# Patient Record
Sex: FEMALE | Race: WHITE | Employment: OTHER | URBAN - METROPOLITAN AREA
[De-identification: names, ages, dates, MRNs, and addresses within clinical notes are randomized per-mention and may not be internally consistent; named-entity substitution may affect disease eponyms.]

---

## 2017-01-04 ENCOUNTER — HOSPITAL ENCOUNTER (OUTPATIENT)
Dept: PREADMISSION TESTING | Age: 72
Discharge: HOME | End: 2017-01-04
Payer: MEDICARE

## 2017-01-04 DIAGNOSIS — M76.822 POSTERIOR TIBIAL TENDINITIS OF LEFT LEG: ICD-10-CM

## 2017-01-04 LAB
ATRIAL RATE: 84 BPM
CALCULATED P AXIS, ECG09: 15 DEGREES
CALCULATED R AXIS, ECG10: 44 DEGREES
CALCULATED T AXIS, ECG11: 58 DEGREES
DIAGNOSIS, 93000: NORMAL
HCT VFR BLD AUTO: 39.9 % (ref 35–45)
HGB BLD-MCNC: 14 G/DL (ref 12–16)
INR PPP: 1.2 (ref 0.8–1.2)
P-R INTERVAL, ECG05: 226 MS
PLATELET # BLD AUTO: 83 K/UL (ref 135–420)
POTASSIUM SERPL-SCNC: 3.8 MMOL/L (ref 3.5–5.5)
PROTHROMBIN TIME: 14.7 SEC (ref 11.5–15.2)
Q-T INTERVAL, ECG07: 394 MS
QRS DURATION, ECG06: 86 MS
QTC CALCULATION (BEZET), ECG08: 465 MS
VENTRICULAR RATE, ECG03: 84 BPM

## 2017-01-04 PROCEDURE — 93005 ELECTROCARDIOGRAM TRACING: CPT

## 2017-01-04 PROCEDURE — 36415 COLL VENOUS BLD VENIPUNCTURE: CPT | Performed by: PODIATRIST

## 2017-01-04 PROCEDURE — 85018 HEMOGLOBIN: CPT | Performed by: PODIATRIST

## 2017-01-04 PROCEDURE — 85049 AUTOMATED PLATELET COUNT: CPT | Performed by: PODIATRIST

## 2017-01-04 PROCEDURE — 85610 PROTHROMBIN TIME: CPT | Performed by: PODIATRIST

## 2017-01-04 PROCEDURE — 84132 ASSAY OF SERUM POTASSIUM: CPT | Performed by: PODIATRIST

## 2017-01-04 PROCEDURE — 85014 HEMATOCRIT: CPT | Performed by: PODIATRIST

## 2017-08-02 ENCOUNTER — HOSPITAL ENCOUNTER (OUTPATIENT)
Dept: GENERAL RADIOLOGY | Age: 72
Discharge: HOME | End: 2017-08-02
Attending: ORTHOPAEDIC SURGERY
Payer: MEDICARE

## 2017-08-02 ENCOUNTER — HOSPITAL ENCOUNTER (OUTPATIENT)
Dept: PREADMISSION TESTING | Age: 72
Discharge: HOME | End: 2017-08-02
Payer: MEDICARE

## 2017-08-02 DIAGNOSIS — Z01.818 PRE-OP TESTING: ICD-10-CM

## 2017-08-02 LAB
ALBUMIN SERPL BCP-MCNC: 3.4 G/DL (ref 3.4–5)
ALBUMIN/GLOB SERPL: 1.2 {RATIO} (ref 0.8–1.7)
ALP SERPL-CCNC: 96 U/L (ref 45–117)
ALT SERPL-CCNC: 38 U/L (ref 13–56)
ANION GAP BLD CALC-SCNC: 10 MMOL/L (ref 3–18)
APPEARANCE UR: CLEAR
APTT PPP: 33.6 SEC (ref 23–36.4)
AST SERPL W P-5'-P-CCNC: 32 U/L (ref 15–37)
BACTERIA SPEC CULT: NORMAL
BACTERIA URNS QL MICRO: ABNORMAL /HPF
BASOPHILS # BLD AUTO: 0 K/UL (ref 0–0.06)
BASOPHILS # BLD: 0 % (ref 0–2)
BILIRUB SERPL-MCNC: 3.8 MG/DL (ref 0.2–1)
BILIRUB UR QL: ABNORMAL
BUN SERPL-MCNC: 7 MG/DL (ref 7–18)
BUN/CREAT SERPL: 10 (ref 12–20)
CALCIUM SERPL-MCNC: 8.8 MG/DL (ref 8.5–10.1)
CHLORIDE SERPL-SCNC: 105 MMOL/L (ref 100–108)
CO2 SERPL-SCNC: 30 MMOL/L (ref 21–32)
COLOR UR: YELLOW
CREAT SERPL-MCNC: 0.72 MG/DL (ref 0.6–1.3)
DIFFERENTIAL METHOD BLD: ABNORMAL
EOSINOPHIL # BLD: 0.1 K/UL (ref 0–0.4)
EOSINOPHIL NFR BLD: 2 % (ref 0–5)
EPITH CASTS URNS QL MICRO: ABNORMAL /LPF (ref 0–5)
ERYTHROCYTE [DISTWIDTH] IN BLOOD BY AUTOMATED COUNT: 16.3 % (ref 11.6–14.5)
GLOBULIN SER CALC-MCNC: 2.9 G/DL (ref 2–4)
GLUCOSE SERPL-MCNC: 93 MG/DL (ref 74–99)
GLUCOSE UR STRIP.AUTO-MCNC: NEGATIVE MG/DL
HCT VFR BLD AUTO: 38.6 % (ref 35–45)
HGB BLD-MCNC: 13.4 G/DL (ref 12–16)
HGB UR QL STRIP: NEGATIVE
INR PPP: 1.2 (ref 0.8–1.2)
KETONES UR QL STRIP.AUTO: NEGATIVE MG/DL
LEUKOCYTE ESTERASE UR QL STRIP.AUTO: ABNORMAL
LYMPHOCYTES # BLD AUTO: 16 % (ref 21–52)
LYMPHOCYTES # BLD: 1 K/UL (ref 0.9–3.6)
MCH RBC QN AUTO: 35.1 PG (ref 24–34)
MCHC RBC AUTO-ENTMCNC: 34.7 G/DL (ref 31–37)
MCV RBC AUTO: 101 FL (ref 74–97)
MONOCYTES # BLD: 0.6 K/UL (ref 0.05–1.2)
MONOCYTES NFR BLD AUTO: 9 % (ref 3–10)
MUCOUS THREADS URNS QL MICRO: ABNORMAL /LPF
NEUTS SEG # BLD: 4.3 K/UL (ref 1.8–8)
NEUTS SEG NFR BLD AUTO: 73 % (ref 40–73)
NITRITE UR QL STRIP.AUTO: NEGATIVE
PH UR STRIP: 5.5 [PH] (ref 5–8)
PLATELET # BLD AUTO: 75 K/UL (ref 135–420)
PMV BLD AUTO: 10.2 FL (ref 9.2–11.8)
POTASSIUM SERPL-SCNC: 3.7 MMOL/L (ref 3.5–5.5)
PROT SERPL-MCNC: 6.3 G/DL (ref 6.4–8.2)
PROT UR STRIP-MCNC: NEGATIVE MG/DL
PROTHROMBIN TIME: 15 SEC (ref 11.5–15.2)
RBC # BLD AUTO: 3.82 M/UL (ref 4.2–5.3)
RBC #/AREA URNS HPF: ABNORMAL /HPF (ref 0–5)
SERVICE CMNT-IMP: NORMAL
SODIUM SERPL-SCNC: 145 MMOL/L (ref 136–145)
SP GR UR REFRACTOMETRY: 1.02 (ref 1–1.03)
UROBILINOGEN UR QL STRIP.AUTO: 1 EU/DL (ref 0.2–1)
WBC # BLD AUTO: 5.9 K/UL (ref 4.6–13.2)
WBC URNS QL MICRO: ABNORMAL /HPF (ref 0–5)

## 2017-08-02 PROCEDURE — 85025 COMPLETE CBC W/AUTO DIFF WBC: CPT | Performed by: ORTHOPAEDIC SURGERY

## 2017-08-02 PROCEDURE — 36415 COLL VENOUS BLD VENIPUNCTURE: CPT | Performed by: ORTHOPAEDIC SURGERY

## 2017-08-02 PROCEDURE — 85610 PROTHROMBIN TIME: CPT | Performed by: ORTHOPAEDIC SURGERY

## 2017-08-02 PROCEDURE — 85730 THROMBOPLASTIN TIME PARTIAL: CPT | Performed by: ORTHOPAEDIC SURGERY

## 2017-08-02 PROCEDURE — 80053 COMPREHEN METABOLIC PANEL: CPT | Performed by: ORTHOPAEDIC SURGERY

## 2017-08-02 PROCEDURE — 71010 XR CHEST SNGL V: CPT

## 2017-08-02 PROCEDURE — 81001 URINALYSIS AUTO W/SCOPE: CPT | Performed by: ORTHOPAEDIC SURGERY

## 2017-08-02 PROCEDURE — 87641 MR-STAPH DNA AMP PROBE: CPT | Performed by: ORTHOPAEDIC SURGERY

## 2017-08-15 ENCOUNTER — ANESTHESIA EVENT (OUTPATIENT)
Dept: SURGERY | Age: 72
DRG: 470 | End: 2017-08-15
Payer: MEDICARE

## 2017-08-16 ENCOUNTER — APPOINTMENT (OUTPATIENT)
Dept: GENERAL RADIOLOGY | Age: 72
DRG: 470 | End: 2017-08-16
Attending: ORTHOPAEDIC SURGERY
Payer: MEDICARE

## 2017-08-16 ENCOUNTER — ANESTHESIA (OUTPATIENT)
Dept: SURGERY | Age: 72
DRG: 470 | End: 2017-08-16
Payer: MEDICARE

## 2017-08-16 ENCOUNTER — HOSPITAL ENCOUNTER (INPATIENT)
Age: 72
LOS: 3 days | Discharge: HOME HEALTH | DRG: 470 | End: 2017-08-19
Attending: ORTHOPAEDIC SURGERY | Admitting: ORTHOPAEDIC SURGERY
Payer: MEDICARE

## 2017-08-16 PROBLEM — Z96.659 TOTAL KNEE REPLACEMENT STATUS: Status: ACTIVE | Noted: 2017-08-16

## 2017-08-16 LAB
ABO + RH BLD: NORMAL
BLOOD GROUP ANTIBODIES SERPL: NORMAL
PLATELET # BLD AUTO: 89 K/UL (ref 135–420)
SPECIMEN EXP DATE BLD: NORMAL

## 2017-08-16 PROCEDURE — 77030016547 HC BLD SAW SAG1 STRY -B: Performed by: ORTHOPAEDIC SURGERY

## 2017-08-16 PROCEDURE — 77030011640 HC PAD GRND REM COVD -A: Performed by: ORTHOPAEDIC SURGERY

## 2017-08-16 PROCEDURE — 77030012508 HC MSK AIRWY LMA AMBU -A: Performed by: ANESTHESIOLOGY

## 2017-08-16 PROCEDURE — 74011250637 HC RX REV CODE- 250/637: Performed by: ORTHOPAEDIC SURGERY

## 2017-08-16 PROCEDURE — 77030018842 HC SOL IRR SOD CL 9% BAXT -A: Performed by: ORTHOPAEDIC SURGERY

## 2017-08-16 PROCEDURE — 74011250637 HC RX REV CODE- 250/637: Performed by: ANESTHESIOLOGY

## 2017-08-16 PROCEDURE — C9290 INJ, BUPIVACAINE LIPOSOME: HCPCS | Performed by: ORTHOPAEDIC SURGERY

## 2017-08-16 PROCEDURE — 74011000258 HC RX REV CODE- 258: Performed by: ORTHOPAEDIC SURGERY

## 2017-08-16 PROCEDURE — 64450 NJX AA&/STRD OTHER PN/BRANCH: CPT | Performed by: ORTHOPAEDIC SURGERY

## 2017-08-16 PROCEDURE — 86900 BLOOD TYPING SEROLOGIC ABO: CPT | Performed by: ANESTHESIOLOGY

## 2017-08-16 PROCEDURE — C1776 JOINT DEVICE (IMPLANTABLE): HCPCS | Performed by: ORTHOPAEDIC SURGERY

## 2017-08-16 PROCEDURE — 77030013079 HC BLNKT BAIR HGGR 3M -A: Performed by: NURSE ANESTHETIST, CERTIFIED REGISTERED

## 2017-08-16 PROCEDURE — 65270000029 HC RM PRIVATE

## 2017-08-16 PROCEDURE — 74011250636 HC RX REV CODE- 250/636

## 2017-08-16 PROCEDURE — 0SRC0J9 REPLACEMENT OF RIGHT KNEE JOINT WITH SYNTHETIC SUBSTITUTE, CEMENTED, OPEN APPROACH: ICD-10-PCS | Performed by: ORTHOPAEDIC SURGERY

## 2017-08-16 PROCEDURE — 76010000132 HC OR TIME 2.5 TO 3 HR: Performed by: ORTHOPAEDIC SURGERY

## 2017-08-16 PROCEDURE — 77030020782 HC GWN BAIR PAWS FLX 3M -B: Performed by: ORTHOPAEDIC SURGERY

## 2017-08-16 PROCEDURE — 77030031139 HC SUT VCRL2 J&J -A: Performed by: ORTHOPAEDIC SURGERY

## 2017-08-16 PROCEDURE — 76060000036 HC ANESTHESIA 2.5 TO 3 HR: Performed by: ORTHOPAEDIC SURGERY

## 2017-08-16 PROCEDURE — 74011250636 HC RX REV CODE- 250/636: Performed by: ORTHOPAEDIC SURGERY

## 2017-08-16 PROCEDURE — 77030011256 HC DRSG MEPILEX <16IN NO BORD MOLN -A: Performed by: ORTHOPAEDIC SURGERY

## 2017-08-16 PROCEDURE — 77030038010: Performed by: ORTHOPAEDIC SURGERY

## 2017-08-16 PROCEDURE — 77030020754 HC CUF TRNQT 2BLA STRY -B: Performed by: ORTHOPAEDIC SURGERY

## 2017-08-16 PROCEDURE — 97161 PT EVAL LOW COMPLEX 20 MIN: CPT

## 2017-08-16 PROCEDURE — 77030037875 HC DRSG MEPILEX <16IN BORD MOLN -A: Performed by: ORTHOPAEDIC SURGERY

## 2017-08-16 PROCEDURE — 77030012406 HC DRN WND PENRS BARD -A: Performed by: ORTHOPAEDIC SURGERY

## 2017-08-16 PROCEDURE — C1713 ANCHOR/SCREW BN/BN,TIS/BN: HCPCS | Performed by: ORTHOPAEDIC SURGERY

## 2017-08-16 PROCEDURE — 74011000258 HC RX REV CODE- 258

## 2017-08-16 PROCEDURE — 74011000250 HC RX REV CODE- 250: Performed by: ORTHOPAEDIC SURGERY

## 2017-08-16 PROCEDURE — 73560 X-RAY EXAM OF KNEE 1 OR 2: CPT

## 2017-08-16 PROCEDURE — 77030011628: Performed by: ORTHOPAEDIC SURGERY

## 2017-08-16 PROCEDURE — 77030002912 HC SUT ETHBND J&J -A: Performed by: ORTHOPAEDIC SURGERY

## 2017-08-16 PROCEDURE — 77030027138 HC INCENT SPIROMETER -A: Performed by: ORTHOPAEDIC SURGERY

## 2017-08-16 PROCEDURE — 76210000006 HC OR PH I REC 0.5 TO 1 HR: Performed by: ORTHOPAEDIC SURGERY

## 2017-08-16 PROCEDURE — 77030002935 HC SUT MCRYL J&J -C: Performed by: ORTHOPAEDIC SURGERY

## 2017-08-16 PROCEDURE — 74011000272 HC RX REV CODE- 272: Performed by: ORTHOPAEDIC SURGERY

## 2017-08-16 PROCEDURE — 85049 AUTOMATED PLATELET COUNT: CPT | Performed by: ANESTHESIOLOGY

## 2017-08-16 PROCEDURE — 36415 COLL VENOUS BLD VENIPUNCTURE: CPT | Performed by: ANESTHESIOLOGY

## 2017-08-16 PROCEDURE — 77030013708 HC HNDPC SUC IRR PULS STRY –B: Performed by: ORTHOPAEDIC SURGERY

## 2017-08-16 PROCEDURE — 76942 ECHO GUIDE FOR BIOPSY: CPT | Performed by: ORTHOPAEDIC SURGERY

## 2017-08-16 PROCEDURE — 74011000250 HC RX REV CODE- 250

## 2017-08-16 PROCEDURE — 77030018883 HC BLD SAW SAG4 STRY -B: Performed by: ORTHOPAEDIC SURGERY

## 2017-08-16 PROCEDURE — 97530 THERAPEUTIC ACTIVITIES: CPT

## 2017-08-16 DEVICE — COMPONENT PAT DIA32MM KNEE POLY CEM MEDIALIZED ANAT ATTUNE: Type: IMPLANTABLE DEVICE | Site: KNEE | Status: FUNCTIONAL

## 2017-08-16 DEVICE — INSERT TIB RP FEM KNEE CEM: Type: IMPLANTABLE DEVICE | Site: KNEE | Status: FUNCTIONAL

## 2017-08-16 DEVICE — IMPLANTABLE DEVICE: Type: IMPLANTABLE DEVICE | Site: KNEE | Status: FUNCTIONAL

## 2017-08-16 DEVICE — CEMENT BNE 40GM FULL DOSE PMMA W/ GENT HI VISC RADPQ LNG: Type: IMPLANTABLE DEVICE | Site: KNEE | Status: FUNCTIONAL

## 2017-08-16 DEVICE — CEMENT BNE 40GM FULL DOSE PMMA W/O ANTIBIO HI VISC N RADPQ: Type: IMPLANTABLE DEVICE | Site: KNEE | Status: FUNCTIONAL

## 2017-08-16 RX ORDER — DEXTROSE 50 % IN WATER (D50W) INTRAVENOUS SYRINGE
25-50 AS NEEDED
Status: DISCONTINUED | OUTPATIENT
Start: 2017-08-16 | End: 2017-08-16 | Stop reason: HOSPADM

## 2017-08-16 RX ORDER — DIPHENHYDRAMINE HYDROCHLORIDE 50 MG/ML
12.5 INJECTION, SOLUTION INTRAMUSCULAR; INTRAVENOUS
Status: DISCONTINUED | OUTPATIENT
Start: 2017-08-16 | End: 2017-08-16 | Stop reason: HOSPADM

## 2017-08-16 RX ORDER — DOCUSATE SODIUM 100 MG/1
100 CAPSULE, LIQUID FILLED ORAL 3 TIMES DAILY
Status: DISCONTINUED | OUTPATIENT
Start: 2017-08-16 | End: 2017-08-19 | Stop reason: HOSPADM

## 2017-08-16 RX ORDER — DESOXIMETASONE 0.5 MG/G
CREAM TOPICAL
Status: DISCONTINUED | OUTPATIENT
Start: 2017-08-16 | End: 2017-08-19 | Stop reason: HOSPADM

## 2017-08-16 RX ORDER — GLYCOPYRROLATE 0.2 MG/ML
INJECTION INTRAMUSCULAR; INTRAVENOUS AS NEEDED
Status: DISCONTINUED | OUTPATIENT
Start: 2017-08-16 | End: 2017-08-16 | Stop reason: HOSPADM

## 2017-08-16 RX ORDER — ASPIRIN 325 MG/1
325 TABLET, FILM COATED ORAL 2 TIMES DAILY
Status: DISCONTINUED | OUTPATIENT
Start: 2017-08-16 | End: 2017-08-19 | Stop reason: HOSPADM

## 2017-08-16 RX ORDER — CELECOXIB 100 MG/1
200 CAPSULE ORAL ONCE
Status: COMPLETED | OUTPATIENT
Start: 2017-08-16 | End: 2017-08-16

## 2017-08-16 RX ORDER — DEXAMETHASONE SODIUM PHOSPHATE 4 MG/ML
INJECTION, SOLUTION INTRA-ARTICULAR; INTRALESIONAL; INTRAMUSCULAR; INTRAVENOUS; SOFT TISSUE AS NEEDED
Status: DISCONTINUED | OUTPATIENT
Start: 2017-08-16 | End: 2017-08-16 | Stop reason: HOSPADM

## 2017-08-16 RX ORDER — OXYCODONE HYDROCHLORIDE 5 MG/1
10 TABLET ORAL ONCE
Status: DISCONTINUED | OUTPATIENT
Start: 2017-08-16 | End: 2017-08-16

## 2017-08-16 RX ORDER — SODIUM CHLORIDE 0.9 % (FLUSH) 0.9 %
5-10 SYRINGE (ML) INJECTION EVERY 8 HOURS
Status: DISCONTINUED | OUTPATIENT
Start: 2017-08-16 | End: 2017-08-19 | Stop reason: HOSPADM

## 2017-08-16 RX ORDER — SODIUM CHLORIDE, SODIUM LACTATE, POTASSIUM CHLORIDE, CALCIUM CHLORIDE 600; 310; 30; 20 MG/100ML; MG/100ML; MG/100ML; MG/100ML
125 INJECTION, SOLUTION INTRAVENOUS CONTINUOUS
Status: DISPENSED | OUTPATIENT
Start: 2017-08-16 | End: 2017-08-17

## 2017-08-16 RX ORDER — PROPRANOLOL HYDROCHLORIDE 10 MG/1
5 TABLET ORAL DAILY
Status: DISCONTINUED | OUTPATIENT
Start: 2017-08-17 | End: 2017-08-19 | Stop reason: HOSPADM

## 2017-08-16 RX ORDER — SODIUM CHLORIDE, SODIUM LACTATE, POTASSIUM CHLORIDE, CALCIUM CHLORIDE 600; 310; 30; 20 MG/100ML; MG/100ML; MG/100ML; MG/100ML
125 INJECTION, SOLUTION INTRAVENOUS CONTINUOUS
Status: DISCONTINUED | OUTPATIENT
Start: 2017-08-16 | End: 2017-08-16

## 2017-08-16 RX ORDER — DIPHENHYDRAMINE HCL 25 MG
25 CAPSULE ORAL
Status: DISCONTINUED | OUTPATIENT
Start: 2017-08-16 | End: 2017-08-19 | Stop reason: HOSPADM

## 2017-08-16 RX ORDER — LORATADINE AND PSEUDOEPHEDRINE 10; 240 MG/1; MG/1
1 TABLET, EXTENDED RELEASE ORAL DAILY
Status: DISCONTINUED | OUTPATIENT
Start: 2017-08-17 | End: 2017-08-19 | Stop reason: HOSPADM

## 2017-08-16 RX ORDER — FLUTICASONE PROPIONATE 50 MCG
2 SPRAY, SUSPENSION (ML) NASAL DAILY
Status: DISCONTINUED | OUTPATIENT
Start: 2017-08-17 | End: 2017-08-19 | Stop reason: HOSPADM

## 2017-08-16 RX ORDER — SODIUM CHLORIDE 0.9 % (FLUSH) 0.9 %
5-10 SYRINGE (ML) INJECTION AS NEEDED
Status: CANCELLED | OUTPATIENT
Start: 2017-08-16

## 2017-08-16 RX ORDER — OXYCODONE HYDROCHLORIDE 5 MG/1
10 TABLET ORAL
Status: DISCONTINUED | OUTPATIENT
Start: 2017-08-16 | End: 2017-08-17

## 2017-08-16 RX ORDER — DIPHENHYDRAMINE HYDROCHLORIDE 50 MG/ML
12.5 INJECTION, SOLUTION INTRAMUSCULAR; INTRAVENOUS
Status: DISCONTINUED | OUTPATIENT
Start: 2017-08-16 | End: 2017-08-19 | Stop reason: HOSPADM

## 2017-08-16 RX ORDER — KETOROLAC TROMETHAMINE 15 MG/ML
15 INJECTION, SOLUTION INTRAMUSCULAR; INTRAVENOUS EVERY 6 HOURS
Status: DISPENSED | OUTPATIENT
Start: 2017-08-16 | End: 2017-08-18

## 2017-08-16 RX ORDER — FENTANYL CITRATE 50 UG/ML
INJECTION, SOLUTION INTRAMUSCULAR; INTRAVENOUS AS NEEDED
Status: DISCONTINUED | OUTPATIENT
Start: 2017-08-16 | End: 2017-08-16 | Stop reason: HOSPADM

## 2017-08-16 RX ORDER — PROCHLORPERAZINE EDISYLATE 5 MG/ML
10 INJECTION INTRAMUSCULAR; INTRAVENOUS
Status: DISCONTINUED | OUTPATIENT
Start: 2017-08-16 | End: 2017-08-19 | Stop reason: HOSPADM

## 2017-08-16 RX ORDER — PROPOFOL 10 MG/ML
INJECTION, EMULSION INTRAVENOUS AS NEEDED
Status: DISCONTINUED | OUTPATIENT
Start: 2017-08-16 | End: 2017-08-16 | Stop reason: HOSPADM

## 2017-08-16 RX ORDER — MAGNESIUM SULFATE 100 %
4 CRYSTALS MISCELLANEOUS AS NEEDED
Status: DISCONTINUED | OUTPATIENT
Start: 2017-08-16 | End: 2017-08-16 | Stop reason: HOSPADM

## 2017-08-16 RX ORDER — ONDANSETRON 2 MG/ML
INJECTION INTRAMUSCULAR; INTRAVENOUS AS NEEDED
Status: DISCONTINUED | OUTPATIENT
Start: 2017-08-16 | End: 2017-08-16 | Stop reason: HOSPADM

## 2017-08-16 RX ORDER — CLINDAMYCIN PHOSPHATE 900 MG/50ML
900 INJECTION, SOLUTION INTRAVENOUS ONCE
Status: COMPLETED | OUTPATIENT
Start: 2017-08-16 | End: 2017-08-16

## 2017-08-16 RX ORDER — PREGABALIN 100 MG/1
100 CAPSULE ORAL
Status: COMPLETED | OUTPATIENT
Start: 2017-08-16 | End: 2017-08-16

## 2017-08-16 RX ORDER — PANTOPRAZOLE SODIUM 40 MG/1
40 TABLET, DELAYED RELEASE ORAL DAILY
Status: DISCONTINUED | OUTPATIENT
Start: 2017-08-17 | End: 2017-08-19 | Stop reason: HOSPADM

## 2017-08-16 RX ORDER — NALOXONE HYDROCHLORIDE 0.4 MG/ML
0.4 INJECTION, SOLUTION INTRAMUSCULAR; INTRAVENOUS; SUBCUTANEOUS AS NEEDED
Status: DISCONTINUED | OUTPATIENT
Start: 2017-08-16 | End: 2017-08-19 | Stop reason: HOSPADM

## 2017-08-16 RX ORDER — LORAZEPAM 0.5 MG/1
0.5 TABLET ORAL
Status: DISCONTINUED | OUTPATIENT
Start: 2017-08-16 | End: 2017-08-19 | Stop reason: HOSPADM

## 2017-08-16 RX ORDER — ALBUTEROL SULFATE 0.83 MG/ML
2.5 SOLUTION RESPIRATORY (INHALATION) AS NEEDED
Status: DISCONTINUED | OUTPATIENT
Start: 2017-08-16 | End: 2017-08-16 | Stop reason: HOSPADM

## 2017-08-16 RX ORDER — SODIUM CHLORIDE, SODIUM LACTATE, POTASSIUM CHLORIDE, CALCIUM CHLORIDE 600; 310; 30; 20 MG/100ML; MG/100ML; MG/100ML; MG/100ML
150 INJECTION, SOLUTION INTRAVENOUS CONTINUOUS
Status: DISCONTINUED | OUTPATIENT
Start: 2017-08-16 | End: 2017-08-16 | Stop reason: HOSPADM

## 2017-08-16 RX ORDER — LIDOCAINE HYDROCHLORIDE 20 MG/ML
INJECTION, SOLUTION EPIDURAL; INFILTRATION; INTRACAUDAL; PERINEURAL AS NEEDED
Status: DISCONTINUED | OUTPATIENT
Start: 2017-08-16 | End: 2017-08-16 | Stop reason: HOSPADM

## 2017-08-16 RX ORDER — OXYCODONE HYDROCHLORIDE 5 MG/1
5 TABLET ORAL
Status: DISCONTINUED | OUTPATIENT
Start: 2017-08-16 | End: 2017-08-17

## 2017-08-16 RX ORDER — SODIUM CHLORIDE 0.9 % (FLUSH) 0.9 %
5-10 SYRINGE (ML) INJECTION EVERY 8 HOURS
Status: CANCELLED | OUTPATIENT
Start: 2017-08-16

## 2017-08-16 RX ORDER — SODIUM CHLORIDE 0.9 % (FLUSH) 0.9 %
5-10 SYRINGE (ML) INJECTION AS NEEDED
Status: DISCONTINUED | OUTPATIENT
Start: 2017-08-16 | End: 2017-08-19 | Stop reason: HOSPADM

## 2017-08-16 RX ORDER — HYDROMORPHONE HYDROCHLORIDE 1 MG/ML
0.5 INJECTION, SOLUTION INTRAMUSCULAR; INTRAVENOUS; SUBCUTANEOUS
Status: DISCONTINUED | OUTPATIENT
Start: 2017-08-16 | End: 2017-08-19 | Stop reason: HOSPADM

## 2017-08-16 RX ORDER — MELOXICAM 7.5 MG/1
7.5 TABLET ORAL DAILY
Status: DISCONTINUED | OUTPATIENT
Start: 2017-08-18 | End: 2017-08-19 | Stop reason: HOSPADM

## 2017-08-16 RX ORDER — LANOLIN ALCOHOL/MO/W.PET/CERES
1 CREAM (GRAM) TOPICAL
Status: DISCONTINUED | OUTPATIENT
Start: 2017-08-17 | End: 2017-08-19 | Stop reason: HOSPADM

## 2017-08-16 RX ORDER — ROPIVACAINE HYDROCHLORIDE 5 MG/ML
INJECTION, SOLUTION EPIDURAL; INFILTRATION; PERINEURAL AS NEEDED
Status: DISCONTINUED | OUTPATIENT
Start: 2017-08-16 | End: 2017-08-16 | Stop reason: HOSPADM

## 2017-08-16 RX ORDER — NALOXONE HYDROCHLORIDE 0.4 MG/ML
0.1 INJECTION, SOLUTION INTRAMUSCULAR; INTRAVENOUS; SUBCUTANEOUS AS NEEDED
Status: DISCONTINUED | OUTPATIENT
Start: 2017-08-16 | End: 2017-08-16 | Stop reason: HOSPADM

## 2017-08-16 RX ORDER — GABAPENTIN 300 MG/1
600 CAPSULE ORAL
Status: DISCONTINUED | OUTPATIENT
Start: 2017-08-16 | End: 2017-08-19 | Stop reason: HOSPADM

## 2017-08-16 RX ORDER — ACETAMINOPHEN 10 MG/ML
1000 INJECTION, SOLUTION INTRAVENOUS ONCE
Status: DISCONTINUED | OUTPATIENT
Start: 2017-08-16 | End: 2017-08-16 | Stop reason: CLARIF

## 2017-08-16 RX ORDER — LOPERAMIDE HYDROCHLORIDE 2 MG/1
2 CAPSULE ORAL
Status: DISCONTINUED | OUTPATIENT
Start: 2017-08-16 | End: 2017-08-19 | Stop reason: HOSPADM

## 2017-08-16 RX ORDER — PRAVASTATIN SODIUM 20 MG/1
10 TABLET ORAL DAILY
Status: DISCONTINUED | OUTPATIENT
Start: 2017-08-17 | End: 2017-08-19 | Stop reason: HOSPADM

## 2017-08-16 RX ORDER — ONDANSETRON 2 MG/ML
4 INJECTION INTRAMUSCULAR; INTRAVENOUS
Status: DISCONTINUED | OUTPATIENT
Start: 2017-08-16 | End: 2017-08-19 | Stop reason: HOSPADM

## 2017-08-16 RX ORDER — MIDAZOLAM HYDROCHLORIDE 1 MG/ML
INJECTION, SOLUTION INTRAMUSCULAR; INTRAVENOUS AS NEEDED
Status: DISCONTINUED | OUTPATIENT
Start: 2017-08-16 | End: 2017-08-16 | Stop reason: HOSPADM

## 2017-08-16 RX ORDER — LOTEPREDNOL ETABONATE 5 MG/ML
1 SUSPENSION/ DROPS OPHTHALMIC 2 TIMES DAILY
Status: DISCONTINUED | OUTPATIENT
Start: 2017-08-16 | End: 2017-08-19 | Stop reason: HOSPADM

## 2017-08-16 RX ORDER — ONDANSETRON 2 MG/ML
4 INJECTION INTRAMUSCULAR; INTRAVENOUS ONCE
Status: DISCONTINUED | OUTPATIENT
Start: 2017-08-16 | End: 2017-08-16 | Stop reason: HOSPADM

## 2017-08-16 RX ORDER — SODIUM CHLORIDE 0.9 % (FLUSH) 0.9 %
5-10 SYRINGE (ML) INJECTION AS NEEDED
Status: DISCONTINUED | OUTPATIENT
Start: 2017-08-16 | End: 2017-08-16 | Stop reason: HOSPADM

## 2017-08-16 RX ADMIN — CLINDAMYCIN PHOSPHATE 900 MG: 900 INJECTION, SOLUTION INTRAVENOUS at 10:26

## 2017-08-16 RX ADMIN — ONDANSETRON 4 MG: 2 INJECTION INTRAMUSCULAR; INTRAVENOUS at 10:17

## 2017-08-16 RX ADMIN — DOCUSATE SODIUM 100 MG: 100 CAPSULE, LIQUID FILLED ORAL at 16:55

## 2017-08-16 RX ADMIN — ROPIVACAINE HYDROCHLORIDE 20 ML: 5 INJECTION, SOLUTION EPIDURAL; INFILTRATION; PERINEURAL at 10:13

## 2017-08-16 RX ADMIN — KETOROLAC TROMETHAMINE 15 MG: 15 INJECTION, SOLUTION INTRAMUSCULAR; INTRAVENOUS at 17:47

## 2017-08-16 RX ADMIN — MIDAZOLAM HYDROCHLORIDE 2 MG: 1 INJECTION, SOLUTION INTRAMUSCULAR; INTRAVENOUS at 10:26

## 2017-08-16 RX ADMIN — PROPOFOL 200 MG: 10 INJECTION, EMULSION INTRAVENOUS at 10:31

## 2017-08-16 RX ADMIN — SODIUM CHLORIDE, SODIUM LACTATE, POTASSIUM CHLORIDE, AND CALCIUM CHLORIDE 125 ML/HR: 600; 310; 30; 20 INJECTION, SOLUTION INTRAVENOUS at 13:13

## 2017-08-16 RX ADMIN — SODIUM CHLORIDE, SODIUM LACTATE, POTASSIUM CHLORIDE, AND CALCIUM CHLORIDE 125 ML/HR: 600; 310; 30; 20 INJECTION, SOLUTION INTRAVENOUS at 19:25

## 2017-08-16 RX ADMIN — MIDAZOLAM HYDROCHLORIDE 3 MG: 1 INJECTION, SOLUTION INTRAMUSCULAR; INTRAVENOUS at 10:08

## 2017-08-16 RX ADMIN — LIDOCAINE HYDROCHLORIDE 100 MG: 20 INJECTION, SOLUTION EPIDURAL; INFILTRATION; INTRACAUDAL; PERINEURAL at 10:31

## 2017-08-16 RX ADMIN — GLYCOPYRROLATE 0.2 MG: 0.2 INJECTION INTRAMUSCULAR; INTRAVENOUS at 10:35

## 2017-08-16 RX ADMIN — MIDAZOLAM HYDROCHLORIDE 1 MG: 1 INJECTION, SOLUTION INTRAMUSCULAR; INTRAVENOUS at 10:09

## 2017-08-16 RX ADMIN — SODIUM CHLORIDE, SODIUM LACTATE, POTASSIUM CHLORIDE, AND CALCIUM CHLORIDE 125 ML/HR: 600; 310; 30; 20 INJECTION, SOLUTION INTRAVENOUS at 16:56

## 2017-08-16 RX ADMIN — MIDAZOLAM HYDROCHLORIDE 1 MG: 1 INJECTION, SOLUTION INTRAMUSCULAR; INTRAVENOUS at 10:10

## 2017-08-16 RX ADMIN — GLYCOPYRROLATE 0.2 MG: 0.2 INJECTION INTRAMUSCULAR; INTRAVENOUS at 10:17

## 2017-08-16 RX ADMIN — CELECOXIB 200 MG: 100 CAPSULE ORAL at 10:03

## 2017-08-16 RX ADMIN — FENTANYL CITRATE 100 MCG: 50 INJECTION, SOLUTION INTRAMUSCULAR; INTRAVENOUS at 10:26

## 2017-08-16 RX ADMIN — DOCUSATE SODIUM 100 MG: 100 CAPSULE, LIQUID FILLED ORAL at 21:15

## 2017-08-16 RX ADMIN — PREGABALIN 100 MG: 100 CAPSULE ORAL at 10:03

## 2017-08-16 RX ADMIN — ASPIRIN 325 MG: 325 TABLET, FILM COATED ORAL at 21:15

## 2017-08-16 RX ADMIN — DEXAMETHASONE SODIUM PHOSPHATE 4 MG: 4 INJECTION, SOLUTION INTRA-ARTICULAR; INTRALESIONAL; INTRAMUSCULAR; INTRAVENOUS; SOFT TISSUE at 10:17

## 2017-08-16 RX ADMIN — SODIUM CHLORIDE 1 G: 900 INJECTION, SOLUTION INTRAVENOUS at 11:51

## 2017-08-16 RX ADMIN — LOTEPREDNOL ETABONATE 1 DROP: 5 SUSPENSION/ DROPS OPHTHALMIC at 22:21

## 2017-08-16 RX ADMIN — SODIUM CHLORIDE, SODIUM LACTATE, POTASSIUM CHLORIDE, AND CALCIUM CHLORIDE 125 ML/HR: 600; 310; 30; 20 INJECTION, SOLUTION INTRAVENOUS at 09:47

## 2017-08-16 RX ADMIN — SODIUM CHLORIDE, SODIUM LACTATE, POTASSIUM CHLORIDE, AND CALCIUM CHLORIDE: 600; 310; 30; 20 INJECTION, SOLUTION INTRAVENOUS at 10:40

## 2017-08-16 RX ADMIN — Medication 10 ML: at 16:59

## 2017-08-16 RX ADMIN — SODIUM CHLORIDE 1 G: 900 INJECTION, SOLUTION INTRAVENOUS at 10:29

## 2017-08-16 NOTE — PROGRESS NOTES
Problem: Falls - Risk of  Goal: *Absence of Falls  Document Jyoti Fall Risk and appropriate interventions in the flowsheet.    Outcome: Progressing Towards Goal  Fall Risk Interventions:  Mobility Interventions: Patient to call before getting OOB     Mentation Interventions: Adequate sleep, hydration, pain control     Medication Interventions: Patient to call before getting OOB     Elimination Interventions: Bed/chair exit alarm

## 2017-08-16 NOTE — ANESTHESIA POSTPROCEDURE EVALUATION
Post-Anesthesia Evaluation and Assessment    Cardiovascular Function/Vital Signs  Visit Vitals    /72    Pulse 67    Temp 36.2 °C (97.2 °F)    Resp 11    Ht 5' 2\" (1.575 m)    Wt 106.2 kg (234 lb 2 oz)    SpO2 100%    BMI 42.82 kg/m2       Patient is status post Procedure(s):  RIGHT TOTAL KNEE ARTHROPLASTY. Nausea/Vomiting: Controlled. Postoperative hydration reviewed and adequate. Pain:  Pain Scale 1: FLACC (08/16/17 1330)  Pain Intensity 1: 0 (08/16/17 1330)   Managed. Neurological Status:   Neuro (WDL): Within Defined Limits (08/16/17 1315)   At baseline. Mental Status and Level of Consciousness: Arousable. Pulmonary Status:   O2 Device: Nasal cannula (08/16/17 1315)   Adequate oxygenation and airway patent. Complications related to anesthesia: None    Post-anesthesia assessment completed. No concerns. Patient has met all discharge requirements.     Signed By: Roderick Troncoso CRNA    August 16, 2017

## 2017-08-16 NOTE — ROUTINE PROCESS
Bedside and Verbal shift change report given to RELL Jefferson RN/ANUSHA Diaz RN (oncoming nurse) by Sagar Sheehan RN (offgoing nurse). Report included the following information SBAR, Kardex, Intake/Output and MAR.

## 2017-08-16 NOTE — PERIOP NOTES
TRANSFER - OUT REPORT:    Verbal report given to Ansley(name) on Kayleen Reed  being transferred to (unit) for routine post - op       Report consisted of patients Situation, Background, Assessment and   Recommendations(SBAR). Information from the following report(s) SBAR, Kardex, OR Summary, Procedure Summary, Intake/Output and MAR was reviewed with the receiving nurse. Lines:   Peripheral IV 09/01/16 Left Hand (Active)       Peripheral IV 08/16/17 Left Forearm (Active)   Site Assessment Clean, dry, & intact 8/16/2017  1:03 PM   Phlebitis Assessment 0 8/16/2017  1:03 PM   Infiltration Assessment 0 8/16/2017  1:03 PM   Dressing Status Clean, dry, & intact 8/16/2017  1:03 PM   Dressing Type Transparent;Tape 8/16/2017  1:03 PM   Hub Color/Line Status Infusing 8/16/2017  1:03 PM        Opportunity for questions and clarification was provided.       Patient transported with:   Registered Nurse  Tech

## 2017-08-16 NOTE — ANESTHESIA PREPROCEDURE EVALUATION
Anesthetic History   No history of anesthetic complications            Review of Systems / Medical History  Patient summary reviewed, nursing notes reviewed and pertinent labs reviewed    Pulmonary  Within defined limits                 Neuro/Psych         Psychiatric history     Cardiovascular    Hypertension              Exercise tolerance: >4 METS     GI/Hepatic/Renal     GERD      Liver disease     Endo/Other        Arthritis     Other Findings              Physical Exam    Airway  Mallampati: III    Neck ROM: normal range of motion   Mouth opening: Diminished (comment)     Cardiovascular  Regular rate and rhythm,  S1 and S2 normal,  no murmur, click, rub, or gallop             Dental    Dentition: Full upper dentures and Full lower dentures     Pulmonary  Breath sounds clear to auscultation               Abdominal  GI exam deferred       Other Findings            Anesthetic Plan    ASA: 3  Anesthesia type: general and regional - femoral single shot      Post-op pain plan if not by surgeon: regional    Induction: Intravenous  Anesthetic plan and risks discussed with: Patient

## 2017-08-16 NOTE — PROGRESS NOTES
1425 - Patient arrives to unit at this time. Admission completed at this time. Patient is A/O x 4. IV to left forearm intact and patent. Plexis applied to bilateral legs and TEDs to left leg. Ace wrap dressing to right leg CDI. No numbness/tingling. Pedal pulses palpable. Pain 0/10. Patient was oriented to the room to include use of call bell, meal ordering, and use of incentive spirometer. Patient was given explanation of \" up for dinner\" program and has verbalized understanding. Phone and call bell left within reach. Plan of care for the day addressed with patient. Educated on pain medication availability and possible side effects. 1800-Patient attempted to stand with walker and 2 assists, but was unable. Bed pan used and 300 cc of clear, yellow urine out. Shift summary-Patient with no complaints of pain. Unable to ambulate, but moves well in the bed and can move LE. Pedal pulses palpable. Voiding in bed pan.

## 2017-08-16 NOTE — H&P
Patient seen and examined. History and Physical Exam was reviewed.  No changes to History and Physical Exam.    Randolph Denton MD

## 2017-08-16 NOTE — PERIOP NOTES
Patient previously signed consent stating she did not want any blood transfusions. After talking to anesthesia regarding this matter, patient stated she is okay to receive blood in case of an emergency. Changes made to consent and patient, RN and doctor initialed.

## 2017-08-16 NOTE — IP AVS SNAPSHOT
Yuan Fernandes 
 
 
 [address] [address] 
[phone] Patient: Fabiola Cota MRN: [id number] TYT:4/7/0249 You are allergic to the following Allergen Reactions Adhesive Tape-Silicones Other (comments) Skin burns Codeine Nausea and Vomiting Other (comments) Dilaudid (Hydromorphone) Nausea and Vomiting Patient also complained of weakness Doxycycline Hives Nausea and Vomiting Oxycodone Itching Nausea and Vomiting Patient complained of vision disturbance Penicillins Hives Other (comments) \" I go blind\" Recent Documentation Height Weight BMI OB Status Smoking Status 1.575 m 106.2 kg 42.82 kg/m2 Menopause Former Smoker Emergency Contacts Name Discharge Info Relation Home Work Mobile Guille Stroud DISCHARGE CAREGIVER [3] Son [22]   [phone] Mt Stroud DISCHARGE CAREGIVER [3] Son [22]   [phone] [address] Maria Guadalupe CAREGIVER [3] Other Relative [6]   [phone] About your hospitalization You were admitted on:  August 16, 2017 You last received care in the:  [facility] [address] You were discharged on:  August 19, 2017 Unit phone number:  [phone] Why you were hospitalized Your primary diagnosis was:  Not on File Your diagnoses also included: Total Knee Replacement Status Providers Seen During Your Hospitalizations Provider Role Specialty Primary office phone Taylor Love MD Attending Provider Orthopedic Surgery [phone] Your Primary Care Physician (PCP) Primary Care Physician Office Phone Office Fax OTHER, PHYS ** None ** ** None ** Follow-up Information Follow up With Details Comments Contact Info Taylor Love MD On 8/31/2017 Follow up appointment @ 1:30pm [address] [place] [address] 
[phone] Eli to continue managing your healthcare needs. [phone] Evelyn York MD   Patient can only remember the practice name and not the physician Current Discharge Medication List  
  
START taking these medications Dose & Instructions Dispensing Information Comments Morning Noon Evening Bedtime  
 aspirin delayed-release 325 mg tablet Commonly known as:  Prema Roblero Your last dose was: Your next dose is:    
   
   
 Dose:  325 mg Take 1 Tab by mouth two (2) times a day. Quantity:  84 Tab Refills:  0  
     
   
   
   
  
 diphenhydrAMINE 25 mg tablet Commonly known as:  BENADRYL ALLERGY Your last dose was: Your next dose is:    
   
   
 Dose:  25 mg Take 1 Tab by mouth every six (6) hours as needed for Itching. Quantity:  60 Tab Refills:  1  
     
   
   
   
  
 docusate sodium 100 mg capsule Commonly known as:  Jann Stai Your last dose was: Your next dose is:    
   
   
 Dose:  100 mg Take 1 Cap by mouth three (3) times daily as needed for Constipation for up to 90 days. Quantity:  90 Cap Refills:  2  
     
   
   
   
  
 meloxicam 7.5 mg tablet Commonly known as:  MOBIC Your last dose was: Your next dose is:    
   
   
 Dose:  7.5 mg Take 1 Tab by mouth daily. Quantity:  28 Tab Refills:  0  
     
   
   
   
  
 ondansetron 4 mg disintegrating tablet Commonly known as:  ZOFRAN ODT Your last dose was: Your next dose is:    
   
   
 1-2 tabs po every 8 hours prn nasuea Quantity:  60 Tab Refills:  2  
     
   
   
   
  
 oxyCODONE IR 5 mg immediate release tablet Commonly known as:  Karen Magangyr Your last dose was: Your next dose is:    
   
   
 1-2 tabs by mouth every 4-6 hours as needed for pain Quantity:  84 Tab Refills:  0 CONTINUE these medications which have NOT CHANGED Dose & Instructions Dispensing Information Comments Morning Noon Evening Bedtime CLARITIN-D 24 HOUR  mg per tablet Generic drug:  loratadine-pseudoephedrine Your last dose was: Your next dose is:    
   
   
 Dose:  1 Tab Take 1 Tab by mouth daily. Refills:  0  
     
   
   
   
  
 desoximetasone 0.05 % topical cream  
Commonly known as:  TOPICORT Your last dose was: Your next dose is:    
   
   
 Apply  to affected area two (2) times daily as needed for Skin Irritation. Refills:  0  
     
   
   
   
  
 fluticasone 50 mcg/actuation nasal spray Commonly known as:  Ruthe Para Your last dose was: Your next dose is:    
   
   
 Dose:  2 Spray 2 Sprays by Both Nostrils route as needed for Rhinitis. Refills:  0  
     
   
   
   
  
 gabapentin 600 mg tablet Commonly known as:  NEURONTIN Your last dose was: Your next dose is:    
   
   
 Dose:  600 mg Take 600 mg by mouth two (2) times daily as needed. Indications: NEUROPATHIC PAIN Refills:  0  
     
   
   
   
  
 loperamide 2 mg tablet Commonly known as:  IMMODIUM Your last dose was: Your next dose is:    
   
   
 Dose:  2 mg Take 2 mg by mouth three (3) times daily as needed for Diarrhea. Refills:  0 LOTEMAX 0.5 % ophthalmic suspension Generic drug:  loteprednol etabonate Your last dose was: Your next dose is:    
   
   
 Dose:  1 Drop Administer 1 Drop to right eye two (2) times a day. Refills:  0 NexIUM 20 mg capsule Generic drug:  esomeprazole Your last dose was: Your next dose is:    
   
   
 Dose:  20 mg Take 20 mg by mouth as needed. Indications: gastroesophageal reflux disease Refills:  0  
     
   
   
   
  
 pravastatin 10 mg tablet Commonly known as:  PRAVACHOL Your last dose was:     
   
Your next dose is:    
   
   
 Dose:  10 mg  
 Take 10 mg by mouth daily. Indications: HYPERCHOLESTEROLEMIA Refills:  0  
     
   
   
   
  
 propranolol 10 mg tablet Commonly known as:  INDERAL Your last dose was: Your next dose is:    
   
   
 Dose:  5 mg Take 5 mg by mouth daily. Indications: hypertension Refills:  0 Where to Get Your Medications Information on where to get these meds will be given to you by the nurse or doctor. ! Ask your nurse or doctor about these medications  
  aspirin delayed-release 325 mg tablet  
 diphenhydrAMINE 25 mg tablet  
 docusate sodium 100 mg capsule  
 meloxicam 7.5 mg tablet  
 ondansetron 4 mg disintegrating tablet  
 oxyCODONE IR 5 mg immediate release tablet Discharge Instructions [facility] Patient Discharge Instructions Vitor Soto / [id number] : 1945 Admitted 2017 Discharged: 2017 IF YOU HAVE ANY PROBLEMS ONCE YOU ARE AT HOME CALL THE FOLLOWING NUMBERS:  
Main office number: [phone] Your follow up appointment to see either Dr. Forest Thakur is scheduled in 1-2 weeks. If you are unsure of your appointment date call the office at [phone]. Take Home Medications · Resume your home medictions as directed · A prescription for pain medication has been given · It is important that you take the medication exactly as they are prescribed. · Keep your medication in the bottles provided by the pharmacist and keep a list of the medication names, dosages, and times to be taken in your wallet. · Do not take other medications without consulting your doctor. · Note:  If you have already received and/or filled a prescription for one or more of the medications you've received a prescription for when leaving the hospital, you may disguard the duplicate prescription. What to do at [facility] Resume your prehospital diet. If you have excessive nausea or vomitting call your doctor's office Wear portable sequential compressive devices at least 18 hours per day for 2 weeks. They may be used beyond 2 weeks as needed to help control swelling. DARRIAN hose should be worn during the day  may be removed for sleep. Should be worn on both legs for 2 weeks and then on the operative extremity for a total of 6 weeks. Begin In-Home Physical Therapy; 3 times a week to work on gait training, range of motion, strengthening, and weight bearing exercises as tolerable. Continue to use your walker or cane when walking. May progress from the walker to a cane to complete total bearing as tolerable. Keep incision DRY. You may need to sponge bathe to keep the incision dry. When to Call - Call if you have a temperature greater then 101 
- Unable to keep food down - Are unable to bear any wieght  
- Need a pain medication refill Information obtained by : 
I understand that if any problems occur once I am at home I am to contact my physician. I understand and acknowledge receipt of the instructions indicated above. Physician's or R.N.'s Signature                                                                  Date/Time Patient or Representative Signature                                                          Date/Time Discharge Orders None [employer] Announcement We are excited to announce that we are making your provider's discharge notes available to you in [employer].   You will see these notes when they are completed and signed by the physician that discharged you from your recent hospital stay. If you have any questions or concerns about any information you see in [employer], please call the Health Information Department where you were seen or reach out to your Primary Care Provider for more information about your plan of care. Introducing [facility]! [employer] introduces [employer] patient portal. Now you can access parts of your medical record, email your doctor's office, and request medication refills online. 1. In your internet browser, go to [email or web address] 2. Click on the First Time User? Click Here link in the Sign In box. You will see the New Member Sign Up page. 3. Enter your [employer] Access Code exactly as it appears below. You will not need to use this code after youve completed the sign-up process. If you do not sign up before the expiration date, you must request a new code. · [employer] Access Code: [email or web address] Expires: 10/31/2017  3:30 PM 
 
4. Enter the last four digits of your Social Security Number (xxxx) and Date of Birth (mm/dd/yyyy) as indicated and click Submit. You will be taken to the next sign-up page. 5. Create a [employer] ID. This will be your [employer] login ID and cannot be changed, so think of one that is secure and easy to remember. 6. Create a [employer] password. You can change your password at any time. 7. Enter your Password Reset Question and Answer. This can be used at a later time if you forget your password. 8. Enter your e-mail address. You will receive e-mail notification when new information is available in [address] 9. Click Sign Up. You can now view and download portions of your medical record. 10. Click the Download Summary menu link to download a portable copy of your medical information. If you have questions, please visit the Frequently Asked Questions section of the [employer] website. Remember, [employer] is NOT to be used for urgent needs. For medical emergencies, dial 911. Now available from your iPhone and Android! General Information Please provide this summary of care documentation to your next provider. Patient Signature:  ____________________________________________________________ Date:  ____________________________________________________________  
  
Valentine Luna Provider Signature:  ____________________________________________________________ Date:  ____________________________________________________________

## 2017-08-16 NOTE — CDMP QUERY
Patient is noted to have a BMI of 42.8. Please clarify if this patient is:     =>Morbidly obese (BMI ³ 40)  =>Obese (BMI 30 - 39.9)  =>Overweight (BMI 25 - 29.9)  =>Other explanation of clinical findings  =>Unable to determine (no explanation for clinical findings)    Presentation: 5'2\", 233.64 lbs = BMI 42.8    REFERENCE:  The [address] [place] [address] has issued a statement indicating that, \"Individuals who are overweight, obese, or morbidly obese are at an increased risk for certain medical conditions when compared to persons of normal weight. Therefore, these conditions are always clinically significant and reportable when documented by the provider. Please clarify and document your clinical opinion in the progress notes and discharge summary, including the definitive and or presumptive diagnosis, (suspected or probable), related to the above clinical findings. Please include clinical findings supporting your diagnosis.      Graciela Olivia RN, CCDS

## 2017-08-16 NOTE — ANESTHESIA PROCEDURE NOTES
Peripheral Block    Start time: 8/16/2017 10:08 AM  End time: 8/16/2017 10:13 AM  Performed by: Alex Lambert  Authorized by: Alex Lambert       Pre-procedure: Indications: at surgeon's request, post-op pain management, procedure for pain and primary anesthetic    Preanesthetic Checklist: patient identified, risks and benefits discussed, site marked, timeout performed, anesthesia consent given and patient being monitored      Block Type:   Block Type:   Adductor canal  Laterality:  Right  Monitoring:  Standard ASA monitoring, continuous pulse ox, frequent vital sign checks, heart rate, responsive to questions and oxygen  Injection Technique:  Single shot  Procedures: ultrasound guided    Patient Position: supine  Prep: chlorhexidine    Location:  Mid thigh  Needle Type:  Stimuplex  Needle Gauge:  20 G  Needle Localization:  Ultrasound guidance and anatomical landmarks  Medication Injected:  0.5%  ropivacaine  Volume (mL):  20    Assessment:  Number of attempts:  1  Injection Assessment:  Incremental injection every 5 mL, negative aspiration for CSF, no paresthesia, ultrasound image on chart, no intravascular symptoms, negative aspiration for blood and local visualized surrounding nerve on ultrasound  Patient tolerance:  Patient tolerated the procedure well with no immediate complications

## 2017-08-16 NOTE — ROUTINE PROCESS
TRANSFER - IN REPORT:    Verbal report received from JULIANNE Rossi RN(name) on [facility]  being received from PACU(unit) for routine progression of care      Report consisted of patients Situation, Background, Assessment and   Recommendations(SBAR). Information from the following report(s) SBAR, Kardex, Intake/Output and MAR was reviewed with the receiving nurse. Opportunity for questions and clarification was provided. Assessment completed upon patients arrival to unit and care assumed.

## 2017-08-16 NOTE — OP NOTES
[address] [place] [address], [phone]    TOTAL KNEE ARTHROPLASTY    Patient: Abhishek Arzate MRN: [id number]  SSN: [id number]    YOB: 1945  Age: 67 y.o. Sex: female      Date of Surgery: 8/16/2017   Preoperative Diagnosis: OSTEOARTHRITIS RIGHT KNEE   Postoperative Diagnosis: OSTEOARTHRITIS RIGHT KNEE   Location: [facility]  Surgeon: Saintclair Felix, MD  Assistant: Circ-1: Lin Lee  Circ-Relief: Vianey Conner RN; Vic Harris RN  Scrub Tech-1: Manuel Saxena  Retractor Dow: Maryan Lomeli  Surg Asst-1: Crys Aguila. Sukhjinder Velazquez was medically necessary for holding of retractors for exposure and to complete the case. Anesthesia: General + Low femoral Nerve Block + local    Procedure: Right Total Knee Arthroplasty (CPT: 67507)    Findings:  Degenerative joint disease of the right knee. Estimated Blood Loss: 100 mL    Fluids: 2000 mL    Specimens: None    Cultures: None    Complications: None    Tourniquet Time:   Total Tourniquet Time Documented:  Thigh (Right) - 60 minutes  Total: Thigh (Right) - 60 minutes    Tourniquet Pressure: 300 mm Hg    Tranexamic Acid: 1 gram IV: one dose at begining of case and one at the end    Exparel solution: 20 mL (13 mg/mL) + 40 mL NS    Implants:   Implant Name Type Inv.  Item Serial No.  Lot No. LRB No. Used Action   CEMENT [facility] 40GM -- SMARTSET - [id number]  CEMENT [facility] 40GM -- SMARTSET  [facility] [employer] [id number] Right 1 Implanted   CEMENT BNE SMARTSET HV 40GM -- SMARTSET - [id number]  CEMENT BNE SMARTSET HV 40GM -- SMARTSET  [facility] [facility] [employer] [id number] Right 1 Implanted   FEM CR RT FANI SZ 5 BANDAR -- ATTUNE - [id number]  FEM CR RT FANI SZ 5 BANDAR -- ATTUNE  [facility] [employer]THOPED[employer] [id number] Right 1 Implanted   Tibial Base    [facility] [employer]TH[employer] [id number] Right 1 Implanted   PAT BANDAR MEDIAL VALERIY 32MM -- ATTUNE - [id number]  GENARO BANDAR MEDIAL VALERIY 32MM -- ATTUNE  [employer] [id number] Right 1 Implanted   Tibial Insert       [employer] [facility] [employer]TH[employer] [id number] Right 1 Implanted        Patient Condition: Stable.    ---------  INDICATIONS:   This 67y.o.-year-old female has had pain in the right knee for years and has become functionally disabled with respect to the activities of daily living. The pain is increased with weight-bearing. The pain and dysfunction were not releaved by at least three months of conservative therapy such as NSAIDS (ibuprofen, aleve, diclofenac, mobic), analgesics (tylenol, gabapentin), structured flexibility and muscle strengthening physical therapy exercises, activity restrictions, intra-articular cortisone injections, bracing, and use of a cane and walker. The radiographs showed varus alignment and advanced arthritis with joint space narrowing, subchondral sclerosis, and periarticular osteophytes. A right total knee replacement has been recommended. The risks and the possible complications of this surgery and anesthesia including, but not exclusive to, bleeding, infection, dislocation, fracture, blood clots, nerve and vascular injury, possible need for other procedures, and possibly death have been explained to the patient. The patient accepts these risks for the benefits of joint replacement over the failure of non-operative care to provide adequate pain relief and improve their functional disability. The patients medical problems have been addressed by their primary care physician and are deemed stable and as well controlled as possible. Inpatient hospital care was medically necessary, reasonable, and appropriate. This is a medically necessary procedure. As such, without use of a surgical assistant to retract soft tissues, protect vital neuro-vascular structures and assist in the technical aspects of the operation, this procedure would not have been possible. Therefore this assistant was medically necessary.     DESCRIPTION OF PROCEDURE: After the risks and benefits of surgery were discussed, informed consent was obtained. The patient was identified in the preoperative holding area and the operative extremity was marked. Preoperatively a low femoral nerve block was performed by anesthesia. The patient was taken to the operating room and placed in the supine position. General anesthesia was performed. IV antibiotics were given. After verification of the appropriate operative site from the consent form, with confirmation of surgeon, anesthesia and nursing teams, a tourniquet was placed and the right leg was prepped and draped in sterile fashion using Chloraprep. A formal timeout was performed. The tourniquet was inflated and a midline incision was made over the anterior knee medial to the tibial tubercle and the dissection was taken down to Gio's fascia. A medial parapatellar arthrotomy was performed, medial release was made and the infrapatellar fat pad was trimmed. The anterior portions of the medial and lateral menisci were excised. The patella measured 20 mm. A freehand patellar cut was made followed by placement of the protective plate. The knee was flexed and the patella was  allowed to subluxate into the lateral gutter and the knee was flexed. Inspection of the knee revealed cartilage loss from all three compartments greatest medially. The femur was drilled and intramedullary cutting jig was placed in 5 degrees of valgus and 7 mm of distal resection. The distal femoral cut was made. The tibia was then subluxed anteriorly with a large bent knee retractor. The PCL was released from the posterior tibia. The remaining medial and lateral menisci and the periarticular osteophytes were removed. The lateral genicular artery was cauterized. An extramedullary tibial guide was used and a tibial cut was made just inferior to the osteoarticular junction with tibial slope of 7 degrees - matching the patient's native slope.   The extension gap was assessed to ensure full extension could be achieved. A sizing tibial plate was then pinned into place and alignment was checked. The tibia was reamed and broached and any additional osteophytes were removed. The femoral sizer was used to measure the femoral size as well as estimate femoral component rotation using the posterior condylar line as a reference. Gap measuring blocks and the gap /sizing device was then used to examine flexion and extension gaps and confirm femoral component size and rotation. Positioning pins for the distal femoral cutting block were placed into the femur through the /sizing device. The four-in-one cutting block was placed. Anterior-posterior position of the cutting guide was checked using a [employer]. The anterior, posterior and chamfer cuts were made. Using the Femoral Finishing Guide, posterior osteophytes were removed and the sulcus bone was removed. Tibial and femoral trial implants were placed. The patella preparation was then completed with sizing and drilling of the patellar lugs. The trial patellar implant was placed and measured 22 mm. Patellar tracking was midline so no lateral retinacular release was needed. Ligament balancing was performed as needed with release of MCL done on approach. The PCL was intact so a cruciate-retaining implant was used. Excellent stability was achieved. The trial components were removed. 20 cc of Exparel solution and 10 cc of 0.25% Marcaine were injected into the posterior soft tissues. Care was taken to aspirate prior to injecting to prevent intravascular anesthetic injection. The cement was prepared. After preparing the bony surfaces with pulsatile lavage saline, the real components were cemented into place with the trial liner (10 mm). Excess cement was removed prior to hardening with the cement allowed to set up with axial pressure across the knee in full extension.   The remaining 40 cc of Exparel solution and an additional 20 cc of 0.25% Marcaine were injected into the deep and superficial soft tissues around the knee as well as the periosteum. After drying of the cement, the knee was checked for any remaining excess cement and irrigated. The tourniquet was released and hemostasis was achieved. The real polyethylene liner was inserted. A subfascial Hemovac drain was placed. A standard layered closure was performed using #1 Ethibond for the fascia, 1, 0 and 3-0 Vicryl for the subcutaneous and subcuticular layers followed by a running subcuticular skin closure with a 3-0 Monocryl. Steri-strips were applied. Sterile dressings were placed. The patient was awakened and there were no complications. Final sponge and needle counts were correct x2.     Sher Lauren MD

## 2017-08-17 LAB
ANION GAP SERPL CALC-SCNC: 6 MMOL/L (ref 3–18)
BUN SERPL-MCNC: 5 MG/DL (ref 7–18)
BUN/CREAT SERPL: 6 (ref 12–20)
CALCIUM SERPL-MCNC: 8.1 MG/DL (ref 8.5–10.1)
CHLORIDE SERPL-SCNC: 109 MMOL/L (ref 100–108)
CO2 SERPL-SCNC: 29 MMOL/L (ref 21–32)
CREAT SERPL-MCNC: 0.81 MG/DL (ref 0.6–1.3)
GLUCOSE SERPL-MCNC: 154 MG/DL (ref 74–99)
HCT VFR BLD AUTO: 30 % (ref 35–45)
HGB BLD-MCNC: 10.5 G/DL (ref 12–16)
POTASSIUM SERPL-SCNC: 3.6 MMOL/L (ref 3.5–5.5)
SODIUM SERPL-SCNC: 144 MMOL/L (ref 136–145)

## 2017-08-17 PROCEDURE — 97165 OT EVAL LOW COMPLEX 30 MIN: CPT

## 2017-08-17 PROCEDURE — 85018 HEMOGLOBIN: CPT | Performed by: ORTHOPAEDIC SURGERY

## 2017-08-17 PROCEDURE — 97530 THERAPEUTIC ACTIVITIES: CPT

## 2017-08-17 PROCEDURE — 74011250636 HC RX REV CODE- 250/636: Performed by: ORTHOPAEDIC SURGERY

## 2017-08-17 PROCEDURE — 97110 THERAPEUTIC EXERCISES: CPT

## 2017-08-17 PROCEDURE — 36415 COLL VENOUS BLD VENIPUNCTURE: CPT | Performed by: ORTHOPAEDIC SURGERY

## 2017-08-17 PROCEDURE — 97116 GAIT TRAINING THERAPY: CPT

## 2017-08-17 PROCEDURE — 65270000029 HC RM PRIVATE

## 2017-08-17 PROCEDURE — 80048 BASIC METABOLIC PNL TOTAL CA: CPT | Performed by: ORTHOPAEDIC SURGERY

## 2017-08-17 PROCEDURE — 74011250637 HC RX REV CODE- 250/637: Performed by: ORTHOPAEDIC SURGERY

## 2017-08-17 PROCEDURE — 97535 SELF CARE MNGMENT TRAINING: CPT

## 2017-08-17 RX ORDER — HYDROMORPHONE HYDROCHLORIDE 2 MG/1
2 TABLET ORAL
Status: DISCONTINUED | OUTPATIENT
Start: 2017-08-17 | End: 2017-08-18

## 2017-08-17 RX ORDER — HYDROMORPHONE HYDROCHLORIDE 4 MG/1
4 TABLET ORAL
Status: DISCONTINUED | OUTPATIENT
Start: 2017-08-17 | End: 2017-08-18

## 2017-08-17 RX ADMIN — SODIUM CHLORIDE, SODIUM LACTATE, POTASSIUM CHLORIDE, AND CALCIUM CHLORIDE 125 ML/HR: 600; 310; 30; 20 INJECTION, SOLUTION INTRAVENOUS at 03:07

## 2017-08-17 RX ADMIN — KETOROLAC TROMETHAMINE 15 MG: 15 INJECTION, SOLUTION INTRAMUSCULAR; INTRAVENOUS at 05:57

## 2017-08-17 RX ADMIN — KETOROLAC TROMETHAMINE 15 MG: 15 INJECTION, SOLUTION INTRAMUSCULAR; INTRAVENOUS at 00:22

## 2017-08-17 RX ADMIN — PROPRANOLOL HYDROCHLORIDE 5 MG: 10 TABLET ORAL at 08:27

## 2017-08-17 RX ADMIN — FERROUS SULFATE TAB 325 MG (65 MG ELEMENTAL FE) 325 MG: 325 (65 FE) TAB at 08:27

## 2017-08-17 RX ADMIN — LOTEPREDNOL ETABONATE 1 DROP: 5 SUSPENSION/ DROPS OPHTHALMIC at 08:34

## 2017-08-17 RX ADMIN — OXYCODONE HYDROCHLORIDE 5 MG: 5 TABLET ORAL at 18:19

## 2017-08-17 RX ADMIN — ASPIRIN 325 MG: 325 TABLET, FILM COATED ORAL at 21:28

## 2017-08-17 RX ADMIN — ASPIRIN 325 MG: 325 TABLET, FILM COATED ORAL at 08:26

## 2017-08-17 RX ADMIN — DIPHENHYDRAMINE HYDROCHLORIDE 25 MG: 25 CAPSULE ORAL at 20:00

## 2017-08-17 RX ADMIN — LORATADINE AND PSEUDOEPHEDRINE SULFATE 1 TABLET: 10; 240 TABLET, FILM COATED, EXTENDED RELEASE ORAL at 08:34

## 2017-08-17 RX ADMIN — Medication 10 ML: at 05:58

## 2017-08-17 RX ADMIN — KETOROLAC TROMETHAMINE 15 MG: 15 INJECTION, SOLUTION INTRAMUSCULAR; INTRAVENOUS at 12:32

## 2017-08-17 RX ADMIN — PANTOPRAZOLE SODIUM 40 MG: 40 TABLET, DELAYED RELEASE ORAL at 08:27

## 2017-08-17 RX ADMIN — DOCUSATE SODIUM 100 MG: 100 CAPSULE, LIQUID FILLED ORAL at 15:28

## 2017-08-17 RX ADMIN — DOCUSATE SODIUM 100 MG: 100 CAPSULE, LIQUID FILLED ORAL at 08:27

## 2017-08-17 RX ADMIN — DOCUSATE SODIUM 100 MG: 100 CAPSULE, LIQUID FILLED ORAL at 21:27

## 2017-08-17 RX ADMIN — OXYCODONE HYDROCHLORIDE 5 MG: 5 TABLET ORAL at 13:57

## 2017-08-17 RX ADMIN — FLUTICASONE PROPIONATE 2 SPRAY: 50 SPRAY, METERED NASAL at 09:00

## 2017-08-17 RX ADMIN — PRAVASTATIN SODIUM 10 MG: 20 TABLET ORAL at 08:27

## 2017-08-17 RX ADMIN — LOTEPREDNOL ETABONATE 1 DROP: 5 SUSPENSION/ DROPS OPHTHALMIC at 21:34

## 2017-08-17 NOTE — PROGRESS NOTES
Problem: Falls - Risk of  Goal: *Absence of Falls  Document Jyoti Fall Risk and appropriate interventions in the flowsheet.    Outcome: Progressing Towards Goal  Fall Risk Interventions:  Mobility Interventions: Patient to call before getting OOB     Mentation Interventions: Adequate sleep, hydration, pain control     Medication Interventions: Patient to call before getting OOB     Elimination Interventions: Call light in reach, Patient to call for help with toileting needs, Toilet paper/wipes in reach

## 2017-08-17 NOTE — PROGRESS NOTES
Problem: Mobility Impaired (Adult and Pediatric)  Goal: *Acute Goals and Plan of Care (Insert Text)  Goals to be addressed in 1-4 days:  STG  1. Rolling L to R to L modified independent for positioning. 2. Supine to sit to supine S with HR for meals. 3. Sit to stand to sit S with RW in prep for ambulation. LTG  1. Ambulate >150ft S with RW, WBAT, for home/community mobility. 2. Ascend/descend a >1 stair steps CGA with HR for home entry/curb negotiation. 3. Tolerate up in chair 1-2 hours for ADLs. 4. Patient/family to be independent with HEP for follow-up care and safe discharge. Outcome: Progressing Towards Goal  PHYSICAL THERAPY TREATMENT     Patient: Princess Smith (89 y.o. female)  Date: 8/17/2017  Diagnosis: OSTEOARTHRITIS RIGHT KNEE  Total knee replacement status <principal problem not specified>  Procedure(s) (LRB):  RIGHT TOTAL KNEE ARTHROPLASTY (Right) 1 Day Post-Op  Precautions: Fall, WBAT  Chart, physical therapy assessment, plan of care and goals were reviewed. ASSESSMENT:  Pt was found in semi-meyers position prior to treatment session. Pt reported 1-2/10 in R knee. Pt is able to perform mobility task w/ supervision at this time. Pt initially required [address] for proper hand placement w/ RW to perform transfer activity sit<>stand to ensure pt's safety. Pt was able to ambulate 200 ft w/ RW/GB but demonstrates an antalgic step to gait, decrease skip, decrease push off for proper step clearance, and decrease heel strike in the initial phase of gait but no LOB noted. However pt does have some buckling of the R knee but is able to maintain balance while ambulating w/ RW. Pt was able to perform step negotiation using box step w/ RW/GB CGA-S, but no LOB noted. Once transferred back to room pt was able to perform therex activity w/ no difficulty. Pt was left in bed after treatment session, call bell and tray in reach, son present in the room.  Pt is able to safely walk around the hallway and to bathroom independently at this time. Will keep pt on caseload to address the pt's R LE weakness and to continue step negotiation to ensure pt's independence upon d/c. Pt will be decreased to Q/B for PT sessions at this time. Progression toward goals:  [X]      Improving appropriately and progressing toward goals  [ ]      Improving slowly and progressing toward goals  [ ]      Not making progress toward goals and plan of care will be adjusted       PLAN:  Patient continues to benefit from skilled intervention to address the above impairments. Continue treatment per established plan of care. Discharge Recommendations:  Home Health  Further Equipment Recommendations for Discharge:  rolling walker       SUBJECTIVE:   Patient stated I don't have any pain right now.       OBJECTIVE DATA SUMMARY:   Critical Behavior:  Neurologic State: Alert  Orientation Level: Oriented X4  Cognition: Decreased attention/concentration  Safety/Judgement: Good awareness of safety precautions  Functional Mobility Training:  Bed Mobility:  Supine to Sit: Supervision  Sit to Supine: Supervision  Scooting: Supervision  Transfers:  Sit to Stand: Supervision  Stand to Sit: Supervision  Bed to Chair: Contact guard assistance  Balance:  Sitting: Intact  Standing: Intact; With support  Standing - Static: Fair  Standing - Dynamic : Fair              Range Of Motion:              AROM: Within functional limits              PROM: Within functional limits              Ambulation/Gait Training:  Distance (ft): 200 Feet (ft)  Assistive Device: Gait belt;Walker, rolling  Ambulation - Level of Assistance: Modified independent;Supervision  Gait Abnormalities: Antalgic;Decreased step clearance; Step to gait  Base of Support: Shift to left  Stance: Right decreased  Speed/Lindsey: Slow  Step Length: Left shortened;Right shortened  Swing Pattern: Left asymmetrical;Right asymmetrical  Stairs:  Number of Stairs Trained: 2  Stairs - Level of Assistance: Supervision;Contact guard assistance  Rail Use: None  Therapeutic Exercises:         EXERCISE   Sets   Reps   Active Active Assist   Passive Self ROM   Comments   Ankle Pumps 1 10  [X] [ ] [ ] [ ]     Quad Sets/Glut Sets     [ ] [ ] [ ] [ ]     Hamstring Sets     [ ] [ ] [ ] [ ]     Short Arc Quads 2 10 [X] [ ] [ ] [ ]     Heel Slides 1 10 [X] [ ] [ ] [ ]     Straight Leg Raises 2 10 [X] [ ] [ ] [ ]     Hip Abd/Add     [ ] [ ] [ ] [ ]     Amador Vasquez 2 8 [X] [ ] [ ] [ ]     Roberto Home     [ ] [ ] [ ] [ ]     Gwendel Simmering     [ ] [ ] [ ] [ ]           [ ] [ ] [ ] [ ]        Pain:  Pain Scale 1: Numeric (0 - 10)  Pain Intensity 1: 2  Pain Location 1: Knee  Pain Orientation 1: Right  Pain Description 1: Aching     Activity Tolerance:   Good  Please refer to the flowsheet for vital signs taken during this treatment.   After treatment:   [ ] Patient left in no apparent distress sitting up in chair  [X] Patient left in no apparent distress in bed  [X] Call bell left within reach  [X] Nursing notified  [X] Caregiver present  [ ] Bed alarm activated      Irving Napoles PT   Time Calculation: 44 mins

## 2017-08-17 NOTE — PROGRESS NOTES
Chart reviewed, met with pt at bedside. Pt plans discharge home to son and daughter in [address] [facility] home, CM confirmed address. FOC offered and pt chose [facility] [phone] for follow up; referral placed with CMS. Pt has RW for home. Care Management Interventions  PCP Verified by CM:  Yes  Transition of Care Consult (CM Consult): [address]: No  Reason Outside [place]: Physician referred to specific agency ([facility])  Discharge Durable Medical Equipment: No  Physical Therapy Consult: Yes  Occupational Therapy Consult: Yes  Current Support Network: Relative's Home  Confirm Follow Up Transport: Family  Plan discussed with Pt/Family/Caregiver: Yes  Freedom of Choice Offered: Yes  Discharge Location  Discharge Placement: Home with home health

## 2017-08-17 NOTE — ROUTINE PROCESS
Bedside and Verbal shift change report given to Anne Agee RN (oncoming nurse) by Mike Shepherd RN (offgoing nurse). Report included the following information SBAR, Kardex, OR Summary, Procedure Summary, Intake/Output and MAR.

## 2017-08-17 NOTE — PROGRESS NOTES
0905 - Patient in chair at this time. A/O x 4. IV to left forearm  intact and patent. Plexis and TEDs to bilateral legs. Mepilex dressing to right leg CDI, and small [place] of dark redish drainage to small dressing covering hemovac removal site. No numbness/tingling. Pedal pulses palpable. Lungs CTA. Bowel sounds active to all quadrants. Patient is unable to rate pain at this time. 1357-Oxycodone 5 mg given for c/o pain rated 6/10 to right leg. 1430-Pain decreased to 4/10.  1819-Oxycodone 5 mg given for c/o pain rated 6/10 to right leg.  1900-Pain decreased to 4/10.  2000-Benadryl given for c/o itching. Shift summary-Patient ambulating in hallway with walker and PT. Tolerating meals. Pain controlled with PRN pain medication, but Oxycodone caused itching. Dr. Renate Gilford changed Pain medication, but it has not been given on this shift. Voiding without difficulty. IV site discontinued d/t infiltration.

## 2017-08-17 NOTE — PROGRESS NOTES
Problem: Mobility Impaired (Adult and Pediatric)  Goal: *Acute Goals and Plan of Care (Insert Text)  Goals to be addressed in 1-4 days:  STG  1. Rolling L to R to L modified independent for positioning. 2. Supine to sit to supine S with HR for meals. 3. Sit to stand to sit S with RW in prep for ambulation. LTG  1. Ambulate >150ft S with RW, WBAT, for home/community mobility. 2. Ascend/descend a >1 stair steps CGA with HR for home entry/curb negotiation. 3. Tolerate up in chair 1-2 hours for ADLs. 4. Patient/family to be independent with HEP for follow-up care and safe discharge. Outcome: Progressing Towards Goal  PHYSICAL THERAPY EVALUATION     Patient: Vitor Soto (63 y.o. female)  Date: 8/16/2017  Primary Diagnosis: OSTEOARTHRITIS RIGHT KNEE  Total knee replacement status  Procedure(s) (LRB):  RIGHT TOTAL KNEE ARTHROPLASTY (Right) Day of Surgery   Precautions:   Fall, WBAT      ASSESSMENT :  Based on the objective data described below, the patient presents with lower extremity weakness, decreased gait quality, decreased L knee ROM/flexibility, impaired stair negotiation, and overall limitations in functional mobility s/p L TKR. Pt performed supine to sit with CGA, sit to stand with ModA. Pt able to perform SLR in supine however with standing pt demonstrated buckling and unable to participate in gait training at this time. Pt tolerated session well as evidenced by no c/o increased pain, no lightheadedness or dizziness. SCD and ice pacs reapplied, family present upon exiting. Patient would benefit from skilled inpatient physical therapy to address deficits, progress as tolerated to achieve long term goals and allow safe discharge. Patient will benefit from skilled intervention to address the above impairments.   Patients rehabilitation potential is considered to be Good  Factors which may influence rehabilitation potential include:   [ ]         None noted  [ ]         Mental ability/status  [ ] Medical condition  [ ]         Home/family situation and support systems  [ ]         Safety awareness  [X]         Pain tolerance/management  [ ]         Other:        PLAN :  Recommendations and Planned Interventions:  [X]           Bed Mobility Training             [X]    Neuromuscular Re-Education  [X]           Transfer Training                   [ ]    Orthotic/Prosthetic Training  [X]           Gait Training                          [ ]    Modalities  [X]           Therapeutic Exercises          [ ]    Edema Management/Control  [X]           Therapeutic Activities            [X]    Patient and Family Training/Education  [ ]           Other (comment):     Frequency/Duration: Patient will be followed by physical therapy twice daily to address goals. Discharge Recommendations: Home Health  Further Equipment Recommendations for Discharge: rolling walker       SUBJECTIVE:   Patient stated I feel good but I just having trouble standing up.       OBJECTIVE DATA SUMMARY:       Past Medical History:   Diagnosis Date    Adverse effect of anesthesia       BP dropped after 1 of the D&C    Arthritis      Cirrhosis of liver (HCC)      Fibromyalgia      GERD (gastroesophageal reflux disease)      Hypertension       well controlled with meds patient states    Ill-defined condition       seasonal and environmental allergies    Ill-defined condition       was told in the past has a short neck    Ill-defined condition       previous periods of feeling like she is going to pass out, 2 episodes.      Psychiatric disorder       depression     Past Surgical History:   Procedure Laterality Date    HX BREAST LUMPECTOMY         bilateral    HX DILATION AND CURETTAGE        HX HEENT         sinus surgery    HX MOHS PROCEDURES         bilateral    HX ORTHOPAEDIC         right foot surgery     HX ORTHOPAEDIC         right knee surgery     HX ORTHOPAEDIC         bilateral carpal tunnel release     HX OTHER SURGICAL         removal of fatty tumor back and abdomen    HX TONSILLECTOMY         Barriers to Learning/Limitations: None  Compensate with: N/A  Prior Level of Function/Home Situation: Independent amb c/AD  Home Situation  Home Environment: Private residence  # Steps to Enter: 1  One/Two Story Residence: One story  Living Alone: No  Support Systems: Family member(s)  Patient Expects to be Discharged to[de-identified] Private residence  Current DME Used/Available at Home: Walker, rolling  Critical Behavior:  Neurologic State: Alert  Orientation Level: Oriented X4  Psychosocial  Patient Behaviors: Calm;Talkative  Purposeful Interaction: Yes  Pt Identified Daily Priority: Clinical issues (comment)  Caring Interventions: Therapeutic modalities  Skin Condition/Temp: Warm;Dry  Skin Integrity: Incision (comment)  Skin Integumentary  Skin Color: Appropriate for ethnicity  Skin Condition/Temp: Warm;Dry  Skin Integrity: Incision (comment)  Turgor: Non-tenting  Hair Growth: Present  Varicosities: Absent  Strength:    Strength: Generally decreased, functional  Tone & Sensation:   Tone: Normal  Sensation: Impaired  Range Of Motion:  AROM: Generally decreased, functional  PROM: Generally decreased, functional  Functional Mobility:  Bed Mobility:  Supine to Sit: Contact guard assistance  Sit to Supine: Contact guard assistance  Transfers:  Sit to Stand: Moderate assistance  Stand to Sit: Minimum assistance  Balance:   Sitting: Intact; Without support  Standing: Impaired; With support  Standing - Static: Fair  Standing - Dynamic : Poor  Pain:  Pain Scale 1: FLACC  Pain Intensity 1: 0  Activity Tolerance:   Good  Please refer to the flowsheet for vital signs taken during this treatment.   After treatment:   [ ]         Patient left in no apparent distress sitting up in chair  [X]         Patient left in no apparent distress in bed  [X]         Call bell left within reach  [X]         Nursing notified  [ ]         Caregiver present  [ ] Bed alarm activated      COMMUNICATION/EDUCATION:   [X]         Fall prevention education was provided and the patient/caregiver indicated understanding. [X]         Patient/family have participated as able in goal setting and plan of care. [X]         Patient/family agree to work toward stated goals and plan of care. [ ]         Patient understands intent and goals of therapy, but is neutral about his/her participation. [ ]         Patient is unable to participate in goal setting and plan of care.      Thank you for this referral.  Yvonne Harmon   Time Calculation: 30 mins      Eval Complexity: History: MEDIUM  Complexity : 1-2 comorbidities / personal factors will impact the outcome/ POC Exam:LOW Complexity : 1-2 Standardized tests and measures addressing body structure, function, activity limitation and / or participation in recreation  Presentation: LOW Complexity : Stable, uncomplicated  Clinical Decision Making:Medium Complexity amb <30 ft, Mod for stand transfer Overall Complexity:LOW

## 2017-08-17 NOTE — PROGRESS NOTES
Problem: Self Care Deficits Care Plan (Adult)  Goal: *Acute Goals and Plan of Care (Insert Text)  Short Term Goals 8/17/17: Lavelle Tripp OTR/L  In one week:  1. Pt will perform LB dressing tasks using AE PRN with distant supervision. 2. Pt will perform toilet transfers and toileting routine using RW and elevate toilet frame with distant supervision. 3. Pt will perform grooming tasks at sink in standing position with distant supervision and standing tolerance 10+ minutes. Outcome: Progressing Towards Goal  OCCUPATIONAL THERAPY EVALUATION     Patient: Aravind Forrester (79 y.o. female)  Date: 8/17/2017  Primary Diagnosis: OSTEOARTHRITIS RIGHT KNEE  Total knee replacement status  Procedure(s) (LRB):  RIGHT TOTAL KNEE ARTHROPLASTY (Right) 1 Day Post-Op   Precautions:   Fall, WBAT      ASSESSMENT :  Based on the objective data described below, the patient presents with decreased functional strength, decreased functional balance, decreased overall activity tolerance limiting independence with ADLs. Pt requires CGA to don pants and mod A to don shoes/socks. Pt requires CGA with toileting. Pt reports she has supportive family and they will be able to assist her as needed. Pt would benefit from continued OT services to improve function. Recommend home health therapy upon discharge from hospital.     Education:joint protection; compensatory approaches; fall prevention     Patient will benefit from skilled intervention to address the above impairments.   Patients rehabilitation potential is considered to be Good  Factors which may influence rehabilitation potential include:   [X]             None noted  [ ]             Mental ability/status  [ ]             Medical condition  [ ]             Home/family situation and support systems  [ ]             Safety awareness  [ ]             Pain tolerance/management  [ ]             Other:        PLAN :  Recommendations and Planned Interventions:  [X]               Self Care Training                  [X]        Therapeutic Activities  [X]               Functional Mobility Training    [ ]        Cognitive Retraining  [X]               Therapeutic Exercises           [X]        Endurance Activities  [X]               Balance Training                   [X]        Neuromuscular Re-Education  [ ]               Visual/Perceptual Training     [X]   Home Safety Training  [X]               Patient Education                 [X]        Family Training/Education  [ ]               Other (comment):     Frequency/Duration: Patient will be followed by occupational therapy 2 times a week to address goals. Discharge Recommendations: Home Health  Further Equipment Recommendations for Discharge: bedside commode, shower chair, rolling walker, and AE for LB dressing PRN. SUBJECTIVE:   Patient stated Renan Lucia will be fine at home; my son can help me with everything.       OBJECTIVE DATA SUMMARY:       Past Medical History:   Diagnosis Date    Adverse effect of anesthesia       BP dropped after 1 of the D&C    Arthritis      Cirrhosis of liver (HCC)      Fibromyalgia      GERD (gastroesophageal reflux disease)      Hypertension       well controlled with meds patient states    Ill-defined condition       seasonal and environmental allergies    Ill-defined condition       was told in the past has a short neck    Ill-defined condition       previous periods of feeling like she is going to pass out, 2 episodes.      Psychiatric disorder       depression     Past Surgical History:   Procedure Laterality Date    HX BREAST LUMPECTOMY         bilateral    HX DILATION AND CURETTAGE        HX HEENT         sinus surgery    HX MOHS PROCEDURES         bilateral    HX ORTHOPAEDIC         right foot surgery     HX ORTHOPAEDIC         right knee surgery     HX ORTHOPAEDIC         bilateral carpal tunnel release     HX OTHER SURGICAL         removal of fatty tumor back and abdomen    HX TONSILLECTOMY Barriers to Learning/Limitations: None  Compensate with: visual, verbal, tactile, kinesthetic cues/model     G-Codes (GP)  Self Care   Current  CJ= 20-39%   Goal  CI= 1-19%  The severity rating is based on the professional judgement & direct observation of Level of Assistance required for Functional Mobility and ADLs. Eval Complexity: History: LOW Complexity : Brief history review ; Examination: LOW Complexity : 1-3 performance deficits relating to physical, cognitive , or psychosocial skils that result in activity limitations and / or participation restrictions ; Decision Making:LOW Complexity : No comorbidities that affect functional and no verbal or physical assistance needed to complete eval tasks      Prior Level of Function/Home Situation: Pt lives in [place] with son at independent level. Pt plans to live with other son in [place] upon discharge from [facility]: Private residence  # Steps to Enter: 1  One/Two Story Residence: One story  Living Alone: No  Support Systems: Family member(s)  Patient Expects to be Discharged to[de-identified] Private residence (at [facility] in [place])  Current DME Used/Available at Home: Grab bars, Walker, rolling  Tub or Shower Type: Tub  [X]  Right hand dominant          [ ]  Left hand dominant  Cognitive/Behavioral Status:  Neurologic State: Alert  Orientation Level: Oriented X4  Cognition: Decreased attention/concentration  Safety/Judgement: Good awareness of safety precautions  Skin: site of incision R knee  Edema: swelling R knee to R ankle  Vision/Perceptual:  WFLs  Coordination:  Coordination: Within functional limits  Fine Motor Skills-Upper: Left Intact; Right Intact    Gross Motor Skills-Upper: Left Intact; Right Intact  Balance:  Sitting: Intact  Standing: Intact; With support  Standing - Static: Fair  Standing - Dynamic : Fair  Strength:  Strength:  Within functional limits for bilateral UEs  Tone & Sensation:  Tone: Normal  Sensation: Intact      Range of Motion:  AROM: Within functional limits for bilateral UEs  PROM: Within functional limits for bilateral UEs   Functional Mobility and Transfers for ADLs:  Bed Mobility:  Supine to Sit: Supervision  Sit to Supine: Supervision  Scooting: Supervision  Transfers:  Sit to Stand: Contact guard assistance     Bed to Chair: Contact guard assistance                             Toilet Transfer : Contact guard assistance                                         Bathroom Mobility: Contact guard assistance (RW)  ADL Intervention:        Lower Body Dressing Assistance  Underpants: Contact guard assistance  Pants With Elastic Waist: Contact guard assistance  Socks: Moderate assistance  Shoes with Elastic Laces: Moderate assistance  Cues: Verbal cues provided     Toileting  Toileting Assistance: Contact guard assistance  Bladder Hygiene: Contact guard assistance  Clothing Management: Contact guard assistance  Cues: Verbal cues provided  Adaptive Equipment: ScanSafes (elevated toilet seat)     Cognitive Retraining  Safety/Judgement: Good awareness of safety precautions     Pain:  Pre-treatment: 2/10  Post-treatment: 4/10  Activity Tolerance:   Fair for basic self care and general mobility tasks. Standing tolerance approx 2-3 minutes. Please refer to the flowsheet for vital signs taken during this treatment. After treatment:   [ ] Patient left in no apparent distress sitting up in chair  [X] Patient left in no apparent distress in bed  [X] Call bell left within reach  [ ] Nursing notified  [ ] Caregiver present  [ ] Bed alarm activated      COMMUNICATION/EDUCATION:   [ ] Home safety education was provided and the patient/caregiver indicated understanding. [X] Patient/family have participated as able in goal setting and plan of care. [X] Patient/family agree to work toward stated goals and plan of care.   [ ] Patient understands intent and goals of therapy, but is neutral about his/her participation. [ ] Patient is unable to participate in goal setting and plan of care.      Thank you for this referral.  Damon Khan OT  Time Calculation: 40 mins

## 2017-08-17 NOTE — ROUTINE PROCESS
0750-Bedside and Verbal shift change report given to KEMI Dove RN (oncoming nurse) by RELL Jefferson RN (offgoing nurse). Report included the following information SBAR, Kardex, Intake/Output and MAR.

## 2017-08-17 NOTE — PROGRESS NOTES
[address] care of patient at this time. Patient denies chest pain, shortness of breath, or numbness or tingling in the upper or lower extremities. Dressing on the right knee is clean dry and intact. SCDs and DARRIAN hose in place on the patient. 18g IV in the left forearm is patent and infusing LR @ 125mL/hr. Patient currently experiencing 2/10 pain. Bed is in lowest position with the wheels locked. Siderails x 3 are up. Call bell within reach. Patient is currently resting in bed in no apparent distress. 2115 - Head to toe assessment performed at this time. Patient has no complaints of chest pain or shortness of breath. Denies any numbness or tingling in extremities. Lungs are clear to auscultation. Bowel sounds are present in all 4 quadrants. Patient educated how to  actively manage their pain. Patient encouraged to use the incentive spirometer. Patient educated on the side effects of medications. Explained the importance of ambulation. Patient left in bed with call light within reach, bed in lowest position with wheels locked, and siderails x 3 up. Will continue to monitor. 8216 - Hemovac drain removed. Approximately 75 cc drained from Hemovac. Mepilex dressing placed over drain sight. DARRIAN hose place on RLE. Patient up to chair at this time. Fluids discontinued. IV capped with alcohol cap. Shift Summary - Patient had an uneventful night. Patient voided many times via bedpan over the course of the night. Pt did not require any narcotic pain medication over the course of the night. Patient encouraged to continue to actively manage pain and call for assistance. Patient left in room sitting up in chair with no signs of distress. Call bell within reach.

## 2017-08-17 NOTE — PROGRESS NOTES
Progress Note        Patient: Jocelin Abebe MRN: [id number]  SSN: [id number]    YOB: 1945  Age: 67 y.o. Sex: female      1 Day Post-Op status post Procedure(s) (LRB):  RIGHT TOTAL KNEE ARTHROPLASTY (Right)    Admit Date: 2017  Admit Diagnosis: OSTEOARTHRITIS RIGHT KNEE  Total knee replacement status    Subjective:      Doing well. No complaints. No SOB. No Chest Pain. No Nausea or Vomiting. No problems eating or voiding. Objective:        Temp (24hrs), Av.4 °F (36.9 °C), Min:97.9 °F (36.6 °C), Max:98.7 °F (37.1 °C)    Body mass index is 42.82 kg/(m^2). Patient Vitals for the past 12 hrs:   BP Temp Pulse Resp SpO2   17 1541 106/71 98.6 °F (37 °C) 83 18 95 %   17 0806 119/61 98.5 °F (36.9 °C) 71 17 96 %     Recent Labs      17   0200   HGB  10.5*   HCT  30.0*   NA  144   K  3.6   CL  109*   CO2  29   BUN  5*   CREA  0.81   GLU  154*       Physical Exam:  Vital Signs are Stable. No Acute Distress. Alert and Oriented. Negative Homans sign. Toes AROM Full. Neurovascular exam is normal.    Dressing is Clean, Dry, and Intact. Assessment/Plan:     1. Patient doing well. 2. Will d/c roxicodone and start dilaudid due to itching with oxycodone  3. Out of BED / PT / WBAT  4. DVT ppx: Mobilization, Ecotrin, DARRIAN hose, and mechanical compression  5.  D/c planning    Continue PT/OT  Continue ROM    Discharge Plan: Home with [facility] tomorrow    Signed By: Umm Barney MD     2017

## 2017-08-18 LAB — GLUCOSE BLD STRIP.AUTO-MCNC: 137 MG/DL (ref 70–110)

## 2017-08-18 PROCEDURE — 97110 THERAPEUTIC EXERCISES: CPT

## 2017-08-18 PROCEDURE — 74011250637 HC RX REV CODE- 250/637: Performed by: ORTHOPAEDIC SURGERY

## 2017-08-18 PROCEDURE — 97116 GAIT TRAINING THERAPY: CPT

## 2017-08-18 PROCEDURE — 65270000029 HC RM PRIVATE

## 2017-08-18 PROCEDURE — 82962 GLUCOSE BLOOD TEST: CPT

## 2017-08-18 PROCEDURE — 74011250636 HC RX REV CODE- 250/636: Performed by: ORTHOPAEDIC SURGERY

## 2017-08-18 RX ORDER — OXYCODONE HYDROCHLORIDE 5 MG/1
5 TABLET ORAL
Status: DISCONTINUED | OUTPATIENT
Start: 2017-08-18 | End: 2017-08-19 | Stop reason: HOSPADM

## 2017-08-18 RX ORDER — OXYCODONE HYDROCHLORIDE 5 MG/1
TABLET ORAL
Qty: 84 TAB | Refills: 0 | Status: SHIPPED | OUTPATIENT
Start: 2017-08-18 | End: 2019-04-12

## 2017-08-18 RX ORDER — ASPIRIN 325 MG
325 TABLET, DELAYED RELEASE (ENTERIC COATED) ORAL 2 TIMES DAILY
Qty: 84 TAB | Refills: 0 | Status: SHIPPED | OUTPATIENT
Start: 2017-08-18 | End: 2019-04-12

## 2017-08-18 RX ORDER — ONDANSETRON 4 MG/1
TABLET, ORALLY DISINTEGRATING ORAL
Qty: 60 TAB | Refills: 2 | Status: SHIPPED | OUTPATIENT
Start: 2017-08-18 | End: 2019-04-12

## 2017-08-18 RX ORDER — KETOROLAC TROMETHAMINE 15 MG/ML
15 INJECTION, SOLUTION INTRAMUSCULAR; INTRAVENOUS
Status: DISCONTINUED | OUTPATIENT
Start: 2017-08-18 | End: 2017-08-19 | Stop reason: HOSPADM

## 2017-08-18 RX ORDER — OXYCODONE HYDROCHLORIDE 5 MG/1
10 TABLET ORAL
Status: DISCONTINUED | OUTPATIENT
Start: 2017-08-18 | End: 2017-08-19 | Stop reason: HOSPADM

## 2017-08-18 RX ORDER — DOCUSATE SODIUM 100 MG/1
100 CAPSULE, LIQUID FILLED ORAL
Qty: 90 CAP | Refills: 2 | Status: SHIPPED | OUTPATIENT
Start: 2017-08-18 | End: 2017-11-16

## 2017-08-18 RX ORDER — MELOXICAM 7.5 MG/1
7.5 TABLET ORAL DAILY
Qty: 28 TAB | Refills: 0 | Status: SHIPPED | OUTPATIENT
Start: 2017-08-18 | End: 2019-04-12

## 2017-08-18 RX ORDER — ONDANSETRON 4 MG/1
4 TABLET, FILM COATED ORAL
Status: DISCONTINUED | OUTPATIENT
Start: 2017-08-18 | End: 2017-08-19 | Stop reason: HOSPADM

## 2017-08-18 RX ORDER — PROMETHAZINE HYDROCHLORIDE 25 MG/ML
12.5 INJECTION, SOLUTION INTRAMUSCULAR; INTRAVENOUS
Status: DISCONTINUED | OUTPATIENT
Start: 2017-08-18 | End: 2017-08-18

## 2017-08-18 RX ORDER — DEXAMETHASONE SODIUM PHOSPHATE 4 MG/ML
10 INJECTION, SOLUTION INTRA-ARTICULAR; INTRALESIONAL; INTRAMUSCULAR; INTRAVENOUS; SOFT TISSUE
Status: COMPLETED | OUTPATIENT
Start: 2017-08-18 | End: 2017-08-18

## 2017-08-18 RX ORDER — DIPHENHYDRAMINE HCL 25 MG
25 TABLET ORAL
Qty: 60 TAB | Refills: 1 | Status: SHIPPED | OUTPATIENT
Start: 2017-08-18

## 2017-08-18 RX ADMIN — ASPIRIN 325 MG: 325 TABLET, FILM COATED ORAL at 22:05

## 2017-08-18 RX ADMIN — ONDANSETRON 4 MG: 2 INJECTION INTRAMUSCULAR; INTRAVENOUS at 07:21

## 2017-08-18 RX ADMIN — Medication 10 ML: at 09:12

## 2017-08-18 RX ADMIN — PRAVASTATIN SODIUM 10 MG: 20 TABLET ORAL at 09:02

## 2017-08-18 RX ADMIN — LOTEPREDNOL ETABONATE 1 DROP: 5 SUSPENSION/ DROPS OPHTHALMIC at 22:05

## 2017-08-18 RX ADMIN — FERROUS SULFATE TAB 325 MG (65 MG ELEMENTAL FE) 325 MG: 325 (65 FE) TAB at 09:02

## 2017-08-18 RX ADMIN — DIPHENHYDRAMINE HYDROCHLORIDE 12.5 MG: 50 INJECTION, SOLUTION INTRAMUSCULAR; INTRAVENOUS at 09:10

## 2017-08-18 RX ADMIN — MELOXICAM 7.5 MG: 7.5 TABLET ORAL at 09:02

## 2017-08-18 RX ADMIN — DOCUSATE SODIUM 100 MG: 100 CAPSULE, LIQUID FILLED ORAL at 09:03

## 2017-08-18 RX ADMIN — Medication 10 ML: at 22:05

## 2017-08-18 RX ADMIN — HYDROMORPHONE HYDROCHLORIDE 2 MG: 2 TABLET ORAL at 04:01

## 2017-08-18 RX ADMIN — DOCUSATE SODIUM 100 MG: 100 CAPSULE, LIQUID FILLED ORAL at 22:05

## 2017-08-18 RX ADMIN — ASPIRIN 325 MG: 325 TABLET, FILM COATED ORAL at 09:02

## 2017-08-18 RX ADMIN — DEXAMETHASONE SODIUM PHOSPHATE 10 MG: 4 INJECTION, SOLUTION INTRAMUSCULAR; INTRAVENOUS at 09:16

## 2017-08-18 RX ADMIN — PROCHLORPERAZINE EDISYLATE 10 MG: 5 INJECTION INTRAMUSCULAR; INTRAVENOUS at 12:07

## 2017-08-18 RX ADMIN — PANTOPRAZOLE SODIUM 40 MG: 40 TABLET, DELAYED RELEASE ORAL at 09:03

## 2017-08-18 NOTE — ROUTINE PROCESS
Bedside and Verbal shift change report given to RELL Jefferson RN/ANUSHA Diaz RN (oncoming nurse) by Helder Garcia RN (offgoing nurse). Report included the following information SBAR, Kardex, Intake/Output and MAR.

## 2017-08-18 NOTE — PROGRESS NOTES
0700 - Informed that pt was actively vomiting experiencing nausea. Pt appeared clammy and sweaty. Pt received clean gown and wiped down. Pt vital signs stable and bs was 137. Pt stated that she was lightheaded and was encouraged to lay down. Pt received new IV to receive nausea medication. Pt also received fluids to maintain BP. Physician informed of previous events and placed orders for pt. Shift summary - Pt had an eventful night. Physician made aware of changes in pt condition. Pt left stable and oncoming nurse made aware changes.

## 2017-08-18 NOTE — PROGRESS NOTES
[facility] 232 care of patient at this time. Patient denies chest pain, shortness of breath, or numbness or tingling in the upper or lower extremities. Dressing on right knee is clean dry and intact. PLEXIs and DARRIAN hose in place on the patient. Patient currently experiencing 0/10 pain. Bed is in lowest position with the wheels locked. Siderails x 3 are up. Call bell within reach. Patient is currently resting in bed in no apparent distress. 2127 - Head to toe assessment performed at this time. Patient has no complaints of chest pain or shortness of breath. Denies any numbness or tingling in extremities. Lungs are clear to auscultation. Bowel sounds are present in all 4 quadrants. Patient educated how to  actively manage their pain. Patient encouraged to use the incentive spirometer. Patient educated on the side effects of medications. Explained the importance of ambulation. Patient left in bed with call light within reach, bed in lowest position with wheels locked, and siderails x 3 up. Will continue to monitor. 2219 - Pt up to bedside commode. Voided 700cc of clear, yellow urine. Pt returned to bed. Bed in lowest and locked position, call light within reach, and siderails x 3 up. Will continue to monitor. 0404 - Pt up to bedside commode. Voided 700cc of clear, yellow urine. Pt returned to bed. Bed in lowest and locked position, call light within reach, and siderails x 3 are up. Will continue to monitor.

## 2017-08-18 NOTE — PROGRESS NOTES
[facility] 232 care of patient at this time. Patient denies chest pain, shortness of breath, or numbness or tingling in the upper or lower extremities. Dressing on the right knee is clean dry and intact. PLEXIs and DARRIAN hose in place on the patient. Patient currently experiencing 0/10 pain. Bed is in lowest position with the wheels locked. Siderails x 3 are up. Call bell within reach. Patient is currently resting in bed in no apparent distress.

## 2017-08-18 NOTE — DISCHARGE SUMMARY
[address] [address]     DISCHARGE SUMMARY     PATIENT: Nichole Sebastian     MRN: [id number]   ADMIT DATE: 2017   BILLIN   DISCHARGE DATE:  17     ATTENDING: Adali Alaniz MD   DICTATING: Rachel Waite MD     ADMISSION DIAGNOSIS: OSTEOARTHRITIS RIGHT KNEE  Total knee replacement status    DISCHARGE DIAGNOSIS: Status post OSTEOARTHRITIS RIGHT KNEE    HISTORY OF PRESENT ILLNESS: The patient is a 67y.o. year-old female   with ongoing right knee pain secondary to osteoarthritis of her right knee. The patient's pain has persisted and progressed despite conservative treatments and therapies. The patient has at this time opted for surgical intervention. PAST MEDICAL HISTORY:   Past Medical History:   Diagnosis Date    Adverse effect of anesthesia     BP dropped after 1 of the D&C    Arthritis     Cirrhosis of liver (HCC)     Fibromyalgia     GERD (gastroesophageal reflux disease)     Hypertension     well controlled with meds patient states    Ill-defined condition     seasonal and environmental allergies    Ill-defined condition     was told in the past has a short neck    Ill-defined condition     previous periods of feeling like she is going to pass out, 2 episodes.      Psychiatric disorder     depression       PAST SURGICAL HISTORY:   Past Surgical History:   Procedure Laterality Date    HX BREAST LUMPECTOMY      bilateral    HX DILATION AND CURETTAGE      HX HEENT      sinus surgery    HX MOHS PROCEDURES      bilateral    HX ORTHOPAEDIC      right foot surgery     HX ORTHOPAEDIC      right knee surgery     HX ORTHOPAEDIC      bilateral carpal tunnel release     HX OTHER SURGICAL      removal of fatty tumor back and abdomen    HX TONSILLECTOMY         ALLERGIES:   Allergies   Allergen Reactions    Adhesive Tape-Silicones Other (comments)     Skin burns    Codeine Nausea and Vomiting and Other (comments)    Dilaudid [Hydromorphone] Nausea and Vomiting     Patient also complained of weakness    Doxycycline Hives and Nausea and Vomiting    Oxycodone Itching and Nausea and Vomiting     Patient complained of vision disturbance    Penicillins Hives and Other (comments)     \" I go blind\"        CURRENT MEDICATIONS:  A list of medications prior to the time of admission include:  Prior to Admission medications    Medication Sig Start Date End Date Taking? Authorizing Provider   docusate sodium (COLACE) 100 mg capsule Take 1 Cap by mouth three (3) times daily as needed for Constipation for up to 90 days. 8/18/17 11/16/17 Yes Mitzi Sacks, MD   meloxicam (MOBIC) 7.5 mg tablet Take 1 Tab by mouth daily. 8/18/17  Yes Mitzi Sacks, MD   oxyCODONE IR (ROXICODONE) 5 mg immediate release tablet 1-2 tabs by mouth every 4-6 hours as needed for pain 8/18/17  Yes Mitzi Sacks, MD   aspirin delayed-release (ECOTRIN) 325 mg tablet Take 1 Tab by mouth two (2) times a day. 8/18/17  Yes Mitzi Sacks, MD   diphenhydrAMINE (BENADRYL ALLERGY) 25 mg tablet Take 1 Tab by mouth every six (6) hours as needed for Itching. 8/18/17  Yes Mitzi Sacks, MD   propranolol (INDERAL) 10 mg tablet Take 5 mg by mouth daily. Indications: hypertension   Yes Historical Provider   loratadine-pseudoephedrine (CLARITIN-D 24 HOUR)  mg per tablet Take 1 Tab by mouth daily. Yes Historical Provider   esomeprazole (NEXIUM) 20 mg capsule Take 20 mg by mouth as needed. Indications: gastroesophageal reflux disease   Yes Historical Provider   loperamide (IMMODIUM) 2 mg tablet Take 2 mg by mouth three (3) times daily as needed for Diarrhea. Historical Provider   loteprednol etabonate (LOTEMAX) 0.5 % ophthalmic suspension Administer 1 Drop to right eye two (2) times a day. Historical Provider   desoximetasone (TOPICORT) 0.05 % topical cream Apply  to affected area two (2) times daily as needed for Skin Irritation. Historical Provider   fluticasone (FLONASE) 50 mcg/actuation nasal spray 2 Sprays by Both Nostrils route as needed for Rhinitis. Historical Provider   gabapentin (NEURONTIN) 600 mg tablet Take 600 mg by mouth two (2) times daily as needed. Indications: NEUROPATHIC PAIN    Historical Provider   pravastatin (PRAVACHOL) 10 mg tablet Take 10 mg by mouth daily. Indications: HYPERCHOLESTEROLEMIA    Historical Provider       FAMILY HISTORY: History reviewed. No pertinent family history. SOCIAL HISTORY:   Social History     Social History    Marital status:      Spouse name: N/A    Number of children: N/A    Years of education: N/A     Social History Main Topics    Smoking status: Former Smoker     Quit date: 1977    Smokeless tobacco: Never Used    Alcohol use No    Drug use: No    Sexual activity: Not Asked     Other Topics Concern    None     Social History Narrative       REVIEW OF SYSTEMS: All review of systems are negative. PHYSICAL EXAMINATION: For a detailed physical exam, please refer to the patient's chart. HOSPITAL COURSE: The patient was taken to surgery the day of admission. she underwent right total knee replacement. Operative course was benign. Estimated blood loss approximately 100 cc. The patient was taken to the PACU in stable condition and was later taken to the floor in stable condition. During her hospital stay, the patient progressed well with physical therapy and occupational therapy, adherent to instructions. she was placed on Aspirin and mechanical compression for DVT prophylaxis. her pain has been well controlled with oral pain medications. She had some itching with oxycodone so was switched to dilaudid, but had vomiting with dilaudid, so was switched back to oxycodone with benadryl.  her vitals have remained stable. she has also remained hemodynamically stable. DISCHARGE INSTRUCTIONS: The patient is to be transferred to home with Home Health.  she is to continue on her prior medications per the medication reconciliation form, to which we will add:  1. Aspirin 325 mg; 1 tablet p.o. Twice daily for 6 weeks  2. Colace 100 mg by mouth 3 times daily as needed for constipation  3. Roxicodone 5 mg; 1-2 tablets p.o. every 4 to 6 hours as needed for pain  4. Mobic 7.5 mg; 1 tablet by mouth daily  5. Benadryl 25 mg po every 6 hours  6. Zofran 4 mg, 1-2 tabs po every 8 hours prn nausea    The patient is to continue with physical therapy to work on gait training, range of motion, strengthening, and weightbearing exercises as tolerated on her right lower extremity. The patient is to progress from a walker to a cane to complete total weightbearing as tolerable. The patient is to continue to keep her incision dry. The patient is to followup with Dr. Ila Bullard in the office approximately 10-14 days status post for x-rays and further evaluation.     Rae Agrawal MD  9/53/68154:43 AM

## 2017-08-18 NOTE — ROUTINE PROCESS
Bedside and Verbal shift change report given to BRENDA Rangel RN (oncoming nurse) by Candi Quinones. Paul Pena RN (offgoing nurse). Report included the following information SBAR, Kardex, Intake/Output, MAR and Recent Results.

## 2017-08-18 NOTE — PROGRESS NOTES
Problem: Falls - Risk of  Goal: *Absence of Falls  Document Jyoti Fall Risk and appropriate interventions in the flowsheet.    Outcome: Progressing Towards Goal  Fall Risk Interventions:  Mobility Interventions: Patient to call before getting OOB, Utilize walker, cane, or other assitive device     Mentation Interventions: Adequate sleep, hydration, pain control     Medication Interventions: Patient to call before getting OOB     Elimination Interventions: Call light in reach

## 2017-08-18 NOTE — DISCHARGE INSTRUCTIONS
2 [facility]     Patient Discharge Instructions    Elle Garza / [id number] : 1945    Admitted 2017 Discharged: 2017     IF YOU HAVE ANY PROBLEMS ONCE YOU ARE AT HOME CALL THE FOLLOWING NUMBERS:   Main office number: [phone]    Your follow up appointment to see either Dr. Ila Bullard is scheduled in 1-2 weeks. If you are unsure of your appointment date call the office at [phone]. Take Home Medications     · Resume your home medictions as directed  · A prescription for pain medication has been given   · It is important that you take the medication exactly as they are prescribed. · Keep your medication in the bottles provided by the pharmacist and keep a list of the medication names, dosages, and times to be taken in your wallet. · Do not take other medications without consulting your doctor. · Note:  If you have already received and/or filled a prescription for one or more of the medications you've received a prescription for when leaving the hospital, you may disguard the duplicate prescription. What to do at [address] [place] [address] Ave your prehospital diet. If you have excessive nausea or vomitting call your doctor's office     Wear portable sequential compressive devices at least 18 hours per day for 2 weeks. They may be used beyond 2 weeks as needed to help control swelling. DARRIAN hose should be worn during the day - may be removed for sleep. Should be worn on both legs for 2 weeks and then on the operative extremity for a total of 6 weeks. Begin In-Home Physical Therapy; 3 times a week to work on gait training, range of motion, strengthening, and weight bearing exercises as tolerable. Continue to use your walker or cane when walking. May progress from the walker to a cane to complete total bearing as tolerable. Keep incision DRY. You may need to sponge bathe to keep the incision dry.     When to Call    - Call if you have a temperature greater then 101  - Unable to keep food down  - Are unable to bear any wieght   - Need a pain medication refill     Information obtained by :  I understand that if any problems occur once I am at home I am to contact my physician. I understand and acknowledge receipt of the instructions indicated above.                                                                                                                                            Physician's or R.N.'s Signature                                                                  Date/Time                                                                                                                                              Patient or Representative Signature                                                          Date/Time

## 2017-08-18 NOTE — PROGRESS NOTES
Attempted PT treatment at this time, pt reported that she was not feeling well at this time secondary to feeling nauseous. Pt respectively declined treatment. Will attempt PT treatment again once pt's schedule allows.

## 2017-08-18 NOTE — PROGRESS NOTES
Problem: Mobility Impaired (Adult and Pediatric)  Goal: *Acute Goals and Plan of Care (Insert Text)  Goals to be addressed in 1-4 days:  STG  1. Rolling L to R to L modified independent for positioning. 2. Supine to sit to supine S with HR for meals. 3. Sit to stand to sit S with RW in prep for ambulation. LTG  1. Ambulate >150ft S with RW, WBAT, for home/community mobility. 2. Ascend/descend a >1 stair steps CGA with HR for home entry/curb negotiation. 3. Tolerate up in chair 1-2 hours for ADLs. 4. Patient/family to be independent with HEP for follow-up care and safe discharge. Outcome: Resolved/Met Date Met:  08/18/17  PHYSICAL THERAPY TREATMENT/DISCHARGE     Patient: Barrett Naidu (71 y.o. female)  Date: 8/18/2017  Diagnosis: OSTEOARTHRITIS RIGHT KNEE  Total knee replacement status <principal problem not specified>  Procedure(s) (LRB):  RIGHT TOTAL KNEE ARTHROPLASTY (Right) 2 Days Post-Op  Precautions: Fall, WBAT  Chart, physical therapy assessment, plan of care and goals were reviewed. ASSESSMENT:  Pt seen in semi-meyers position prior to treatment session. Pt reported 3/10 pain in R knee prior to treatment session and reports feeling  Generalized weakness secondary to pt vomiting late last night. Pt has met all goals at this time. Pt reported that she had to use the restroom prior to treatment session, once pt completed bathroom activities independently, pt was able to perform gait training. Pt is able to ambulate 150 ft w/ RW/GB but demonstrates an antalgic step to gait, decrease skip, decrease stride length, and decrease step clearance but pt has no signs of LOB at this time. Pt continues to report that she feels her R knee buckling sometimes while ambulating however not noticed by PT at this time and does not hinder pt's dynamic balance while ambulating. Pt was transferred back to room where pt perform therex activty, w/ min VCing.  Pt was left in semi-meyers position w/ all needs met, B/L SCDs donned, ice on R knee, son present in the room, call bell in reach, nurse notified after session. Pt has met all goals at this time, pt is safe for DC from acute PT. Progression toward goals:  [X]      Goals met  [ ]      Improving appropriately and progressing toward goals  [ ]      Improving slowly and progressing toward goals  [ ]      Not making progress toward goals and plan of care will be adjusted       PLAN:  Patient will be discharged from physical therapy at this time. Rationale for discharge:  [X] Goals Achieved  [ ] Chapman Hewangy  [ ] Patient not participating in therapy  [ ] Other:  Discharge Recommendations:  Home Health  Further Equipment Recommendations for Discharge:  rolling walker       SUBJECTIVE:   Patient stated I can't describe what I feel but I don't feel good.       OBJECTIVE DATA SUMMARY:   Critical Behavior:  Neurologic State: Alert  Orientation Level: Oriented X4  Cognition: Appropriate decision making, Appropriate for age attention/concentration, Appropriate safety awareness, Follows commands  Safety/Judgement: Good awareness of safety precautions  Functional Mobility Training:  Bed Mobility:  Supine to Sit: Modified independent;Supervision  Sit to Supine: Modified independent;Supervision  Transfers:  Sit to Stand: Modified independent;Supervision  Stand to Sit: Modified independent;Supervision  Balance:  Sitting: Intact  Standing: Intact; With support  Ambulation/Gait Training:  Distance (ft): 150 Feet (ft)  Assistive Device: Gait belt;Walker, rolling  Ambulation - Level of Assistance: Modified independent;Supervision  Gait Abnormalities: Antalgic;Decreased step clearance; Step to gait  Base of Support: Shift to left  Stance: Right decreased  Speed/Lindsey: Slow  Step Length: Left shortened;Right shortened  Swing Pattern: Left asymmetrical;Right asymmetrical  Therapeutic Exercises:         EXERCISE   Sets   Reps   Active Active Assist   Passive Self ROM   Comments   Ankle Pumps 1 10  [X] [ ] [ ] [ ]     Quad Sets/Glut Sets     [ ] [ ] [ ] [ ]     Hamstring Sets     [ ] [ ] [ ] [ ]     Short Arc Quads 2 10 [X] [ ] [ ] [ ]     Heel Slides 2 8 [ ] [ ] [ ] [X]     Straight Leg Raises 2 10 [X] [ ] [ ] [ ]     Hip Abd/Add     [ ] [ ] [ ] [ ]     You Jewel 2 8 [X] [ ] [ ] [ ]     Uriarte Distad     [ ] [ ] [ ] [ ]     [place]ia [place]     [ ] [ ] [ ] [ ]           [ ] [ ] [ ] [ ]        Pain:  Pain Scale 1: Numeric (0 - 10)  Pain Intensity 1: 3  Pain Location 1: Knee  Pain Orientation 1: Left  Pain Description 1: Heaviness; Sharp  Pain Intervention(s) 1: Ice  Activity Tolerance:   Good  Please refer to the flowsheet for vital signs taken during this treatment.   After treatment:   [ ] Patient left in no apparent distress sitting up in chair  [X] Patient left in no apparent distress in bed  [X] Call bell left within reach  [X] Nursing notified  [X] Caregiver present  [ ] Bed alarm activated  Arlyss Clamp, PT   Time Calculation: 31 mins

## 2017-08-18 NOTE — PROGRESS NOTES
conducted an initial consultation and Spiritual Assessment for [facility], who is a 67 y.o.,female. Patients Primary Language is: [place]. According to the patients EMR Sabianism Affiliation is: [facility].     The reason the Patient came to the hospital is:   Patient Active Problem List    Diagnosis Date Noted    Total knee replacement status 08/16/2017    Cortical cataract of right eye 08/30/2016        The  provided the following Interventions:  Initiated a relationship of care and support. Explored issues of lauren, belief, spirituality and Islam/ritual needs while hospitalized. Listened empathically. Provided chaplaincy education. Provided information about Spiritual Care Services. Offered prayer and assurance of continued prayers on patients behalf. Chart reviewed. The following outcomes were achieved:  Patient shared limited information about both their medical narrative and spiritual journey/beliefs. Patient processed feeling about current hospitalization. Patient expressed gratitude for pastoral care visit. Assessment:  Patient does not have any Islam/cultural needs that will affect patients preferences in health care. There are no further spiritual or Islam issues which require intervention at this time. Plan:  Chaplains will continue to follow and will provide pastoral care on an as needed/requested basis.  recommends bedside caregivers page  on duty if patient shows signs of acute spiritual or emotional distress.       Sister Clarita Bond, [place], [address]  [phone]

## 2017-08-18 NOTE — PROGRESS NOTES
Progress Note        Patient: Vicenta Abel MRN: [id number]  SSN: [id number]    YOB: 1945  Age: 67 y.o. Sex: female      2 Days Post-Op status post Procedure(s) (LRB):  RIGHT TOTAL KNEE ARTHROPLASTY (Right)    Admit Date: 2017  Admit Diagnosis: OSTEOARTHRITIS RIGHT KNEE  Total knee replacement status    Subjective:      +Nausea and vomiting this am.  No SOB. No Chest Pain. Objective:        Temp (24hrs), Av.5 °F (36.9 °C), Min:98.4 °F (36.9 °C), Max:98.7 °F (37.1 °C)    Body mass index is 42.82 kg/(m^2). Patient Vitals for the past 12 hrs:   BP Temp Pulse Resp SpO2   17 0805 109/63 - - - -   17 0700 97/70 98.4 °F (36.9 °C) 60 22 95 %   17 0655 - - 60 20 97 %   17 0506 125/57 98.4 °F (36.9 °C) 98 16 98 %   17 0426 122/49 98.4 °F (36.9 °C) 97 16 97 %   17 0021 123/55 98.4 °F (36.9 °C) 98 16 98 %     Recent Labs      17   0200   HGB  10.5*   HCT  30.0*   NA  144   K  3.6   CL  109*   CO2  29   BUN  5*   CREA  0.81   GLU  154*       Physical Exam:  Vital Signs are Stable. No Acute Distress. Alert and Oriented. Negative Homans sign. Toes AROM Full. Neurovascular exam is normal.    Dressing is Clean, Dry, and Intact. Assessment/Plan:     1. Patient doing fair - d/c dilaudid, resume roxicodone for prn pain (may need Benadryl for itching with oxycodone)  2. Pt is morbidly obese with BMI >40  3. Out of BED / PT / WBAT  4. DVT ppx: Mobilization, Ecotrin, DARRIAN hose, and mechanical compression  5.  D/c planning    Continue PT/OT  Continue ROM    Discharge Plan: Home with [facility] later today v tomorrow    Signed By: Benito Rutledge MD     2017

## 2017-08-18 NOTE — PROGRESS NOTES
Problem: Falls - Risk of  Goal: *Absence of Falls  Document Jyoti Fall Risk and appropriate interventions in the flowsheet.    Outcome: Progressing Towards Goal  Fall Risk Interventions:  Mobility Interventions: Patient to call before getting OOB     Mentation Interventions: Adequate sleep, hydration, pain control     Medication Interventions: Patient to call before getting OOB     Elimination Interventions: Call light in reach

## 2017-08-19 VITALS
RESPIRATION RATE: 18 BRPM | HEART RATE: 70 BPM | OXYGEN SATURATION: 99 % | WEIGHT: 234.13 LBS | BODY MASS INDEX: 43.08 KG/M2 | TEMPERATURE: 98.6 F | DIASTOLIC BLOOD PRESSURE: 55 MMHG | HEIGHT: 62 IN | SYSTOLIC BLOOD PRESSURE: 134 MMHG

## 2017-08-19 PROCEDURE — 74011250637 HC RX REV CODE- 250/637: Performed by: ORTHOPAEDIC SURGERY

## 2017-08-19 RX ADMIN — PRAVASTATIN SODIUM 10 MG: 20 TABLET ORAL at 05:00

## 2017-08-19 RX ADMIN — DOCUSATE SODIUM 100 MG: 100 CAPSULE, LIQUID FILLED ORAL at 08:48

## 2017-08-19 RX ADMIN — FERROUS SULFATE TAB 325 MG (65 MG ELEMENTAL FE) 325 MG: 325 (65 FE) TAB at 08:48

## 2017-08-19 RX ADMIN — LORATADINE AND PSEUDOEPHEDRINE SULFATE 1 TABLET: 10; 240 TABLET, FILM COATED, EXTENDED RELEASE ORAL at 08:49

## 2017-08-19 RX ADMIN — MELOXICAM 7.5 MG: 7.5 TABLET ORAL at 08:48

## 2017-08-19 RX ADMIN — ASPIRIN 325 MG: 325 TABLET, FILM COATED ORAL at 08:49

## 2017-08-19 RX ADMIN — PROPRANOLOL HYDROCHLORIDE 5 MG: 10 TABLET ORAL at 08:48

## 2017-08-19 RX ADMIN — PANTOPRAZOLE SODIUM 40 MG: 40 TABLET, DELAYED RELEASE ORAL at 08:48

## 2017-08-19 NOTE — ROUTINE PROCESS
Bedside shift change report given to Jordana Moreno RN (oncoming nurse) by Carine Schmid RN  (offgoing nurse). Report included the following information SBAR, Kardex and MAR.

## 2017-08-19 NOTE — ROUTINE PROCESS
Bedside and Verbal shift change report given to KEMI Zarate RN (oncoming nurse) by RELL Pierce RN (offgoing nurse). Report included the following information SBAR, Kardex, Intake/Output and MAR.

## 2017-08-19 NOTE — PROGRESS NOTES
6226 - Patient in chair at this time. A/O x 3-4. IV to right inner arm  intact and patent. Plexis and TEDs to bilateral legs. Mepilex dressing to right knee, with small amount of drainage at site of hemovac removal. No numbness/tingling. Pedal pulses palpable. Lungs CTA. Bowel sounds active to all quadrants. Pain 0/10. Dual AVS reviewed with QUENTIN Ibrahim RN. All medications reviewed individually with patient. Opportunities for questions and concerns provided. Patient discharged via (mode of transport ie. Car, ambulance or air transport) car. Patient's arm band appropriately discarded.

## 2017-08-19 NOTE — PROGRESS NOTES
Shift assessment completed. Pt in bed. A/Ox4. Pain scale 00/10. Pt denies any SOB or difficulty breathing. Lung sounds clear. Pt denies any chest pain. Bowel sounds active, last bm 08/17/17. Pt denies any numbness or tingling to extremities. Pt denies any calf pain.      2210-pt assisted to bedside commode

## 2019-04-15 PROBLEM — H25.12 NUCLEAR SCLEROSIS OF LEFT EYE: Status: ACTIVE | Noted: 2019-04-15

## 2019-04-15 RX ORDER — SODIUM CHLORIDE 0.9 % (FLUSH) 0.9 %
5-40 SYRINGE (ML) INJECTION AS NEEDED
Status: CANCELLED | OUTPATIENT
Start: 2019-04-15

## 2019-04-15 RX ORDER — SODIUM CHLORIDE 0.9 % (FLUSH) 0.9 %
5-40 SYRINGE (ML) INJECTION EVERY 8 HOURS
Status: CANCELLED | OUTPATIENT
Start: 2019-04-15

## 2019-04-17 ENCOUNTER — ANESTHESIA EVENT (OUTPATIENT)
Dept: SURGERY | Age: 74
End: 2019-04-17
Payer: MEDICARE

## 2019-04-17 RX ORDER — NALOXONE HYDROCHLORIDE 0.4 MG/ML
0.4 INJECTION, SOLUTION INTRAMUSCULAR; INTRAVENOUS; SUBCUTANEOUS AS NEEDED
Status: CANCELLED | OUTPATIENT
Start: 2019-04-17

## 2019-04-17 RX ORDER — SODIUM CHLORIDE, SODIUM LACTATE, POTASSIUM CHLORIDE, CALCIUM CHLORIDE 600; 310; 30; 20 MG/100ML; MG/100ML; MG/100ML; MG/100ML
125 INJECTION, SOLUTION INTRAVENOUS CONTINUOUS
Status: CANCELLED | OUTPATIENT
Start: 2019-04-17

## 2019-04-17 RX ORDER — FLUMAZENIL 0.1 MG/ML
0.2 INJECTION INTRAVENOUS
Status: CANCELLED | OUTPATIENT
Start: 2019-04-17

## 2019-04-17 RX ORDER — SODIUM CHLORIDE 0.9 % (FLUSH) 0.9 %
5-40 SYRINGE (ML) INJECTION AS NEEDED
Status: CANCELLED | OUTPATIENT
Start: 2019-04-17

## 2019-04-17 RX ORDER — SODIUM CHLORIDE 0.9 % (FLUSH) 0.9 %
5-40 SYRINGE (ML) INJECTION EVERY 8 HOURS
Status: CANCELLED | OUTPATIENT
Start: 2019-04-17

## 2019-04-18 ENCOUNTER — HOSPITAL ENCOUNTER (OUTPATIENT)
Age: 74
Setting detail: OUTPATIENT SURGERY
Discharge: HOME | End: 2019-04-18
Attending: OPHTHALMOLOGY | Admitting: OPHTHALMOLOGY
Payer: MEDICARE

## 2019-04-18 ENCOUNTER — ANESTHESIA (OUTPATIENT)
Dept: SURGERY | Age: 74
End: 2019-04-18
Payer: MEDICARE

## 2019-04-18 VITALS
HEIGHT: 63 IN | TEMPERATURE: 97.2 F | WEIGHT: 212.31 LBS | HEART RATE: 63 BPM | RESPIRATION RATE: 18 BRPM | DIASTOLIC BLOOD PRESSURE: 72 MMHG | OXYGEN SATURATION: 100 % | BODY MASS INDEX: 37.62 KG/M2 | SYSTOLIC BLOOD PRESSURE: 131 MMHG

## 2019-04-18 PROCEDURE — 74011250636 HC RX REV CODE- 250/636: Performed by: OPHTHALMOLOGY

## 2019-04-18 PROCEDURE — 74011250636 HC RX REV CODE- 250/636

## 2019-04-18 PROCEDURE — 77030031761 HC CYSTOTOM OPHTH IRR SYS BVTC -A: Performed by: OPHTHALMOLOGY

## 2019-04-18 PROCEDURE — 77030032490 HC SLV COMPR SCD KNE COVD -B: Performed by: OPHTHALMOLOGY

## 2019-04-18 PROCEDURE — 77030011291 HC ERAS HEMSTAT BVTC -A: Performed by: OPHTHALMOLOGY

## 2019-04-18 PROCEDURE — 74011000250 HC RX REV CODE- 250: Performed by: OPHTHALMOLOGY

## 2019-04-18 PROCEDURE — 77030013389: Performed by: OPHTHALMOLOGY

## 2019-04-18 PROCEDURE — 76010000138 HC OR TIME 0.5 TO 1 HR: Performed by: OPHTHALMOLOGY

## 2019-04-18 PROCEDURE — V2632 POST CHMBR INTRAOCULAR LENS: HCPCS | Performed by: OPHTHALMOLOGY

## 2019-04-18 PROCEDURE — 77030006692 HC BLD OPHTH BVRGD BD -B: Performed by: OPHTHALMOLOGY

## 2019-04-18 PROCEDURE — 76210000020 HC REC RM PH II FIRST 0.5 HR: Performed by: OPHTHALMOLOGY

## 2019-04-18 PROCEDURE — 76060000032 HC ANESTHESIA 0.5 TO 1 HR: Performed by: OPHTHALMOLOGY

## 2019-04-18 PROCEDURE — 77030006704 HC BLD OPHTH SLT ALCN -B: Performed by: OPHTHALMOLOGY

## 2019-04-18 PROCEDURE — 77030006694 HC BLD OPHTH CRESC ALCN -B: Performed by: OPHTHALMOLOGY

## 2019-04-18 DEVICE — IMPLANTABLE DEVICE: Type: IMPLANTABLE DEVICE | Site: EYE | Status: FUNCTIONAL

## 2019-04-18 RX ORDER — CYCLOPENTOLATE HYDROCHLORIDE 10 MG/ML
1 SOLUTION/ DROPS OPHTHALMIC
Status: COMPLETED | OUTPATIENT
Start: 2019-04-18 | End: 2019-04-18

## 2019-04-18 RX ORDER — TROPICAMIDE 10 MG/ML
1 SOLUTION/ DROPS OPHTHALMIC
Status: COMPLETED | OUTPATIENT
Start: 2019-04-18 | End: 2019-04-18

## 2019-04-18 RX ORDER — SODIUM CHLORIDE, SODIUM LACTATE, POTASSIUM CHLORIDE, CALCIUM CHLORIDE 600; 310; 30; 20 MG/100ML; MG/100ML; MG/100ML; MG/100ML
125 INJECTION, SOLUTION INTRAVENOUS CONTINUOUS
Status: DISCONTINUED | OUTPATIENT
Start: 2019-04-18 | End: 2019-04-18 | Stop reason: HOSPADM

## 2019-04-18 RX ORDER — BUPIVACAINE HYDROCHLORIDE 7.5 MG/ML
INJECTION, SOLUTION EPIDURAL; RETROBULBAR AS NEEDED
Status: DISCONTINUED | OUTPATIENT
Start: 2019-04-18 | End: 2019-04-18 | Stop reason: HOSPADM

## 2019-04-18 RX ORDER — LIDOCAINE HYDROCHLORIDE 20 MG/ML
INJECTION, SOLUTION INFILTRATION; PERINEURAL AS NEEDED
Status: DISCONTINUED | OUTPATIENT
Start: 2019-04-18 | End: 2019-04-18 | Stop reason: HOSPADM

## 2019-04-18 RX ORDER — PROPOFOL 10 MG/ML
INJECTION, EMULSION INTRAVENOUS AS NEEDED
Status: DISCONTINUED | OUTPATIENT
Start: 2019-04-18 | End: 2019-04-18 | Stop reason: HOSPADM

## 2019-04-18 RX ORDER — PHENYLEPHRINE HYDROCHLORIDE 100 MG/ML
1 SOLUTION/ DROPS OPHTHALMIC
Status: COMPLETED | OUTPATIENT
Start: 2019-04-18 | End: 2019-04-18

## 2019-04-18 RX ORDER — MIDAZOLAM HYDROCHLORIDE 1 MG/ML
INJECTION, SOLUTION INTRAMUSCULAR; INTRAVENOUS AS NEEDED
Status: DISCONTINUED | OUTPATIENT
Start: 2019-04-18 | End: 2019-04-18 | Stop reason: HOSPADM

## 2019-04-18 RX ORDER — PROPARACAINE HYDROCHLORIDE 5 MG/ML
1 SOLUTION/ DROPS OPHTHALMIC
Status: COMPLETED | OUTPATIENT
Start: 2019-04-18 | End: 2019-04-18

## 2019-04-18 RX ORDER — LIDOCAINE HYDROCHLORIDE 20 MG/ML
INJECTION, SOLUTION EPIDURAL; INFILTRATION; INTRACAUDAL; PERINEURAL AS NEEDED
Status: DISCONTINUED | OUTPATIENT
Start: 2019-04-18 | End: 2019-04-18 | Stop reason: HOSPADM

## 2019-04-18 RX ORDER — OFLOXACIN 3 MG/ML
1 SOLUTION/ DROPS OPHTHALMIC
Status: COMPLETED | OUTPATIENT
Start: 2019-04-18 | End: 2019-04-18

## 2019-04-18 RX ORDER — NEOMYCIN SULFATE, POLYMYXIN B SULFATE, AND DEXAMETHASONE 3.5; 10000; 1 MG/G; [USP'U]/G; MG/G
OINTMENT OPHTHALMIC AS NEEDED
Status: DISCONTINUED | OUTPATIENT
Start: 2019-04-18 | End: 2019-04-18 | Stop reason: HOSPADM

## 2019-04-18 RX ORDER — KETOROLAC TROMETHAMINE 5 MG/ML
1 SOLUTION OPHTHALMIC
Status: COMPLETED | OUTPATIENT
Start: 2019-04-18 | End: 2019-04-18

## 2019-04-18 RX ADMIN — PROPARACAINE HYDROCHLORIDE 1 DROP: 5 SOLUTION/ DROPS OPHTHALMIC at 08:49

## 2019-04-18 RX ADMIN — SODIUM CHLORIDE, SODIUM LACTATE, POTASSIUM CHLORIDE, AND CALCIUM CHLORIDE 125 ML/HR: 600; 310; 30; 20 INJECTION, SOLUTION INTRAVENOUS at 09:13

## 2019-04-18 RX ADMIN — TROPICAMIDE 1 DROP: 10 SOLUTION/ DROPS OPHTHALMIC at 08:42

## 2019-04-18 RX ADMIN — TROPICAMIDE 1 DROP: 10 SOLUTION/ DROPS OPHTHALMIC at 08:49

## 2019-04-18 RX ADMIN — PHENYLEPHRINE HYDROCHLORIDE 1 DROP: 100 SOLUTION/ DROPS OPHTHALMIC at 08:42

## 2019-04-18 RX ADMIN — OFLOXACIN 1 DROP: 3 SOLUTION OPHTHALMIC at 08:42

## 2019-04-18 RX ADMIN — CYCLOPENTOLATE HYDROCHLORIDE 1 DROP: 10 SOLUTION/ DROPS OPHTHALMIC at 08:42

## 2019-04-18 RX ADMIN — KETOROLAC TROMETHAMINE 1 DROP: 5 SOLUTION OPHTHALMIC at 08:42

## 2019-04-18 RX ADMIN — PROPOFOL 10 MG: 10 INJECTION, EMULSION INTRAVENOUS at 09:43

## 2019-04-18 RX ADMIN — PROPOFOL 10 MG: 10 INJECTION, EMULSION INTRAVENOUS at 09:42

## 2019-04-18 RX ADMIN — CYCLOPENTOLATE HYDROCHLORIDE 1 DROP: 10 SOLUTION/ DROPS OPHTHALMIC at 08:49

## 2019-04-18 RX ADMIN — PROPARACAINE HYDROCHLORIDE 1 DROP: 5 SOLUTION/ DROPS OPHTHALMIC at 08:42

## 2019-04-18 RX ADMIN — OFLOXACIN 1 DROP: 3 SOLUTION OPHTHALMIC at 08:49

## 2019-04-18 RX ADMIN — MIDAZOLAM HYDROCHLORIDE 1 MG: 1 INJECTION, SOLUTION INTRAMUSCULAR; INTRAVENOUS at 09:36

## 2019-04-18 RX ADMIN — LIDOCAINE HYDROCHLORIDE 20 MG: 20 INJECTION, SOLUTION EPIDURAL; INFILTRATION; INTRACAUDAL; PERINEURAL at 09:40

## 2019-04-18 RX ADMIN — MIDAZOLAM HYDROCHLORIDE 1 MG: 1 INJECTION, SOLUTION INTRAMUSCULAR; INTRAVENOUS at 09:31

## 2019-04-18 RX ADMIN — KETOROLAC TROMETHAMINE 1 DROP: 5 SOLUTION OPHTHALMIC at 08:49

## 2019-04-18 RX ADMIN — PHENYLEPHRINE HYDROCHLORIDE 1 DROP: 100 SOLUTION/ DROPS OPHTHALMIC at 08:49

## 2019-04-18 RX ADMIN — PROPOFOL 60 MG: 10 INJECTION, EMULSION INTRAVENOUS at 09:40

## 2019-04-18 NOTE — DISCHARGE INSTRUCTIONS
[facility]      Name: Ramon Bueno MD      : 1945  Post Op Instructions  Phone: [phone]    Diet  1. Resume usual diet. 2. Do not drink alcohol beverages, including beer, for 24 hours. Activity  1. Do not drive a car or operate any hazardous machinery the day of surgery. 2. You may resume normal activities as tolerated. 3. No bending or heavy lifting  4. No reading. You may watch TV.  5. Bring your eyedrops with you to your appt on Friday, 19 @ 10:00 in [facility]. Wound Care  1. Anticipate that your eye will tear and water. 2. You may also experience a sensation that something is in the eye, like sand or grit but this is normal.  3. Do not touch the affected eye. 4. Do not remove the eye shield until directed to do so by your physician. Medications  1. Take Tylenol Extra Strength 2 tablets by mouth upon arrival home and then every 4 hours as needed for discomfort. 2. Regarding eye drops:  1. In most cases, you will not begin using your eye drops in the surgical eye until the day after surgery. Dr. Donita Dooley will go over all the medications with you at your first [place] [id number] visit. 2. If your eye is NOT patched after surgery, you will begin using your drops right away. Specific instructions will be given to you if this is the case. 3. BRING ALL YOUR EYEDROPS TO YOUR APPOINTMENT. Notify your physician IMMEDIATELY for any of the followin. Excessive pain not relieved by Tylenol, especially headache or increasing pressure in the Operative eye. 2. Persistent nausea lasting more than 8 hours. 3. If any vomiting occurs. If any questions or concerns arise, call your surgeon at [phone]    I have received a verbal explanation and a written copy of the above instructions. I am satisfied that all my questions have been answered and I understand these instructions.       _____________________________ _________ ____________________ _________  Signature of Guardian/Parent  Date  Discharging Nurse  Date            DISCHARGE SUMMARY from Nurse    PATIENT INSTRUCTIONS:    After general anesthesia or intravenous sedation, for 24 hours or while taking prescription Narcotics:  · Limit your activities  · Do not drive and operate hazardous machinery  · Do not make important personal or business decisions  · Do  not drink alcoholic beverages  · If you have not urinated within 8 hours after discharge, please contact your surgeon on call. Report the following to your surgeon:  · Excessive pain, swelling, redness or odor of or around the surgical area  · Temperature over 100.5  · Nausea and vomiting lasting longer than 4 hours or if unable to take medications  · Any signs of decreased circulation or nerve impairment to extremity: change in color, persistent  numbness, tingling, coldness or increase pain  · Any questions    What to do at Home:  Recommended activity: Ambulate in house,     If you experience any of the following symptoms above, please follow up with Dr. Dawn Chi. *  Please give a list of your current medications to your Primary Care Provider. *  Please update this list whenever your medications are discontinued, doses are      changed, or new medications (including over-the-counter products) are added. *  Please carry medication information at all times in case of emergency situations. These are general instructions for a healthy lifestyle:    No smoking/ No tobacco products/ Avoid exposure to second hand smoke  Surgeon General's Warning:  Quitting smoking now greatly reduces serious risk to your health.     Obesity, smoking, and sedentary lifestyle greatly increases your risk for illness    A healthy diet, regular physical exercise & weight monitoring are important for maintaining a healthy lifestyle    You may be retaining fluid if you have a history of heart failure or if you experience any of the following symptoms:  Weight gain of 3 pounds or more overnight or 5 pounds in a week, increased swelling in our hands or feet or shortness of breath while lying flat in bed. Please call your doctor as soon as you notice any of these symptoms; do not wait until your next office visit. Recognize signs and symptoms of STROKE:    F-face looks uneven    A-arms unable to move or move unevenly    S-speech slurred or non-existent    T-time-call 911 as soon as signs and symptoms begin-DO NOT go       Back to bed or wait to see if you get better-TIME IS BRAIN. Warning Signs of HEART ATTACK     Call 911 if you have these symptoms:   Chest discomfort. Most heart attacks involve discomfort in the center of the chest that lasts more than a few minutes, or that goes away and comes back. It can feel like uncomfortable pressure, squeezing, fullness, or pain.  Discomfort in other areas of the upper body. Symptoms can include pain or discomfort in one or both arms, the back, neck, jaw, or stomach.  Shortness of breath with or without chest discomfort.  Other signs may include breaking out in a cold sweat, nausea, or lightheadedness. Don't wait more than five minutes to call 911 - MINUTES MATTER! Fast action can save your life. Calling 911 is almost always the fastest way to get lifesaving treatment. Emergency Medical Services staff can begin treatment when they arrive -- up to an hour sooner than if someone gets to the hospital by car. Patient armband removed and shredded  The discharge information has been reviewed with the patient and caregiver. The patient and caregiver verbalized understanding. Discharge medications reviewed with the patient and caregiver and appropriate educational materials and side effects teaching were provided.   ___________________________________________________________________________________________________________________________________

## 2019-04-18 NOTE — H&P
Date of Surgery Update: 
Magalie Whittington was seen and examined. History and physical has been reviewed. The patient has been examined.  There have been no significant clinical changes since the completion of the originally dated History and Physical. 
 
Signed By: Theodora Sharif MD   
 April 18, 2019 8:46 AM

## 2019-04-18 NOTE — PERIOP NOTES
Reviewed PTA medication list with patient/caregiver and patient/caregiver denies any additional medications. Patient admits to having a responsible adult care for them at home for at least 24 hours after surgery. Patient denies alpesh chewing/swallowing difficulties. Patient vioded. Dual skin assessment & fall risk band verification completed with Carlyle Rogel RN.

## 2019-04-18 NOTE — OP NOTES
Cataract Surgery Operative Note      Patient:  Kayode Sotelo       Sex:    female       DOA: 4/18/2019         YOB: 1945     Age:  76 y.o.        LOS:  LOS: 0 days       Preoperative Diagnosis: CATARACT LEFT EYE    Postoperative Diagnosis:  CATARACT LEFT EYE    Surgeon: Surgeon(s) and Role:     Natasha Wesley MD - Primary    Anesthesia:  MAC    Procedure:  Procedure(s):  CATARACT EXTRACTION WITH INTRA OCULAR LENS IMPLANT LEFT EYE    Fluids:  650 cc LR    Procedure in Detail: The patient was brought to the operating room and given a retrobulbar injection to the left eye consisting of 2% lidocaine plain and 0.75% marcaine. This accomplished good anesthesia and akinesia of the globe. A lid speculum was inserted into the eye and the operating microscope was placed in the appropriate position. Andres scissors and 0.12 pickups were used to create a superior conjunctival peritomy. Hemostasis of the episcleral bed was accomplished with the microcautery eraser tip. A guarded McGregor blade made a groove in the sclera 1-2mm posterior to the blue-gray line. A keratome was used to enter the anterior chamber. Viscoelastic was injected to displace aqueous. A Supersharp blade was used to create a paracentesis tract through clear peripheral cornea at 3:00. The cystitome was inserted into the eye and used to create a rent in the anterior lens capsule. The edge of this was grasped with Utrata forceps and dragged around 360 degrees, creating a circular anterior capsulorhexis. BSS on a cannula was injected under the lip of this to accomplish hydrodelineation and hydrodissection of the lens. The phaco tip was inserted into the eye and used to phacoemulsify and aspirate the entire lens nucleus. The IA tip was then inserted and used to strip residual lens cortex from the capsular bag.   Viscoelastic was used to reform the capsular bag and the foldable intraocular lens on the injector sleeve was inserted into the eye and injected into the capsular bag. It was dialed into appropriate position with the [facility] hook. The IA tip was again inserted and used to remove viscoelastic from the capsular bag around the IOL and from the anterior chamber. Miochol was injected through the paracentesis tract to accomplish pupillary constriction. The wound was tested for patency and no leaks were found. Conjunctivum was reapproximated with cold cautery. The lid speculum was removed, the drapes brought down. Maxitrol ointment was instilled over the eye which was closed with an eye patch followed by a Coyle shield. The patient was discharged to the recovery room in good condition. Estimated Blood Loss: < 1 cc                 Implants:   Implant Name Type Inv.  Item Serial No.  Lot No. LRB No. Used Action   LENS EFRAIN ASPHERIC 3PC 21.0D -- TECNIS  - [id number] Intraocular Lens LENS EFRAIN ASPHERIC 3PC 21.0D -- TECNIS  [id number] [facility] [employer]  Left 1 Implanted       Specimens: * No specimens in log *            Complications:  None

## 2019-04-18 NOTE — ANESTHESIA POSTPROCEDURE EVALUATION
Post-Anesthesia Evaluation and Assessment Cardiovascular Function/Vital Signs Visit Vitals /72 Pulse 63 Temp 36.2 °C (97.2 °F) Resp 18 Ht 5' 3\" (1.6 m) Wt 96.3 kg (212 lb 5 oz) SpO2 100% BMI 37.61 kg/m² Patient is status post Procedure(s): CATARACT EXTRACTION WITH INTRA OCULAR LENS IMPLANT LEFT EYE. Nausea/Vomiting: Controlled. Postoperative hydration reviewed and adequate. Pain: 
Pain Scale 1: Numeric (0 - 10) (04/18/19 1040) Pain Intensity 1: 0 (04/18/19 1040) Managed. Neurological Status:  
Neuro (WDL): Exceptions to WDL (04/18/19 1040) At baseline. Mental Status and Level of Consciousness: Arousable. Pulmonary Status:  
O2 Device: Room air (04/18/19 1040) Adequate oxygenation and airway patent. Complications related to anesthesia: None Post-anesthesia assessment completed. No concerns. Patient has met all discharge requirements. Signed By: Renata Murry MD  
 April 18, 2019

## 2019-04-18 NOTE — ANESTHESIA PREPROCEDURE EVALUATION
Relevant Problems No relevant active problems Anesthetic History Review of Systems / Medical History Patient summary reviewed, nursing notes reviewed and pertinent labs reviewed Pulmonary Smoker (former smoker) Comments: Chronic bronchitis Neuro/Psych Psychiatric history (h/o depression) Cardiovascular Hypertension GI/Hepatic/Renal 
  
GERD Liver disease (cirrhosis) Endo/Other Obesity and arthritis Other Findings Comments: Fibromyalgia Physical Exam 
 
Airway Mallampati: III 
TM Distance: < 4 cm Neck ROM: decreased range of motion, short neck Mouth opening: Normal 
 
 Cardiovascular Rhythm: regular Rate: normal 
 
 
 
 Dental 
 
Dentition: Full lower dentures and Full upper dentures Pulmonary Breath sounds clear to auscultation Abdominal 
GI exam deferred Other Findings Anesthetic Plan ASA: 3 Anesthesia type: MAC Anesthetic plan and risks discussed with: Patient Plan monitored anesthetic care. Patient understands risks including risk of awareness and agrees with plan. All questions answered. Consent signed.

## 2021-11-24 ENCOUNTER — HOSPITAL ENCOUNTER (OUTPATIENT)
Dept: LAB | Age: 76
Discharge: HOME | End: 2021-11-24
Payer: MEDICARE

## 2021-11-24 ENCOUNTER — OFFICE VISIT (OUTPATIENT)
Dept: HEMATOLOGY | Age: 76
End: 2021-11-24
Payer: MEDICARE

## 2021-11-24 VITALS
OXYGEN SATURATION: 98 % | HEART RATE: 82 BPM | WEIGHT: 208 LBS | SYSTOLIC BLOOD PRESSURE: 114 MMHG | TEMPERATURE: 99 F | RESPIRATION RATE: 14 BRPM | DIASTOLIC BLOOD PRESSURE: 60 MMHG | BODY MASS INDEX: 36.86 KG/M2 | HEIGHT: 63 IN

## 2021-11-24 DIAGNOSIS — K74.60 CIRRHOSIS OF LIVER WITHOUT ASCITES, UNSPECIFIED HEPATIC CIRRHOSIS TYPE (HCC): Primary | ICD-10-CM

## 2021-11-24 DIAGNOSIS — K74.60 CIRRHOSIS OF LIVER WITHOUT ASCITES, UNSPECIFIED HEPATIC CIRRHOSIS TYPE (HCC): ICD-10-CM

## 2021-11-24 LAB
ALBUMIN SERPL-MCNC: 2 G/DL (ref 3.4–5)
ALBUMIN/GLOB SERPL: 0.6 {RATIO} (ref 0.8–1.7)
ALP SERPL-CCNC: 87 U/L (ref 45–117)
ALT SERPL-CCNC: 19 U/L (ref 13–56)
ANION GAP SERPL CALC-SCNC: 9 MMOL/L (ref 3–18)
AST SERPL-CCNC: 34 U/L (ref 10–38)
BASOPHILS # BLD: 0 K/UL (ref 0–0.1)
BASOPHILS NFR BLD: 1 % (ref 0–2)
BILIRUB SERPL-MCNC: 2.3 MG/DL (ref 0.2–1)
BUN SERPL-MCNC: 28 MG/DL (ref 7–18)
BUN/CREAT SERPL: 14 (ref 12–20)
CALCIUM SERPL-MCNC: 8.4 MG/DL (ref 8.5–10.1)
CHLORIDE SERPL-SCNC: 109 MMOL/L (ref 100–111)
CO2 SERPL-SCNC: 22 MMOL/L (ref 21–32)
CREAT SERPL-MCNC: 1.95 MG/DL (ref 0.6–1.3)
DIFFERENTIAL METHOD BLD: ABNORMAL
EOSINOPHIL # BLD: 0.2 K/UL (ref 0–0.4)
EOSINOPHIL NFR BLD: 6 % (ref 0–5)
ERYTHROCYTE [DISTWIDTH] IN BLOOD BY AUTOMATED COUNT: 17.2 % (ref 11.6–14.5)
GLOBULIN SER CALC-MCNC: 3.2 G/DL (ref 2–4)
GLUCOSE SERPL-MCNC: 88 MG/DL (ref 74–99)
HCT VFR BLD AUTO: 20.3 % (ref 35–45)
HGB BLD-MCNC: 6.7 G/DL (ref 12–16)
IMM GRANULOCYTES # BLD AUTO: 0 K/UL (ref 0–0.04)
IMM GRANULOCYTES NFR BLD AUTO: 0 % (ref 0–0.5)
INR PPP: 1.7 (ref 0.8–1.2)
LYMPHOCYTES # BLD: 0.7 K/UL (ref 0.9–3.6)
LYMPHOCYTES NFR BLD: 19 % (ref 21–52)
MCH RBC QN AUTO: 34.5 PG (ref 24–34)
MCHC RBC AUTO-ENTMCNC: 33 G/DL (ref 31–37)
MCV RBC AUTO: 104.6 FL (ref 78–100)
MONOCYTES # BLD: 0.5 K/UL (ref 0.05–1.2)
MONOCYTES NFR BLD: 12 % (ref 3–10)
NEUTS SEG # BLD: 2.4 K/UL (ref 1.8–8)
NEUTS SEG NFR BLD: 62 % (ref 40–73)
NRBC # BLD: 0 K/UL (ref 0–0.01)
NRBC BLD-RTO: 0 PER 100 WBC
PLATELET # BLD AUTO: 54 K/UL (ref 135–420)
PMV BLD AUTO: 11.9 FL (ref 9.2–11.8)
POTASSIUM SERPL-SCNC: 3.9 MMOL/L (ref 3.5–5.5)
PROT SERPL-MCNC: 5.2 G/DL (ref 6.4–8.2)
PROTHROMBIN TIME: 19.1 SEC (ref 11.5–15.2)
RBC # BLD AUTO: 1.94 M/UL (ref 4.2–5.3)
SODIUM SERPL-SCNC: 140 MMOL/L (ref 136–145)
WBC # BLD AUTO: 3.8 K/UL (ref 4.6–13.2)

## 2021-11-24 PROCEDURE — 85025 COMPLETE CBC W/AUTO DIFF WBC: CPT

## 2021-11-24 PROCEDURE — 1101F PT FALLS ASSESS-DOCD LE1/YR: CPT | Performed by: INTERNAL MEDICINE

## 2021-11-24 PROCEDURE — 99205 OFFICE O/P NEW HI 60 MIN: CPT | Performed by: INTERNAL MEDICINE

## 2021-11-24 PROCEDURE — 1090F PRES/ABSN URINE INCON ASSESS: CPT | Performed by: INTERNAL MEDICINE

## 2021-11-24 PROCEDURE — G8536 NO DOC ELDER MAL SCRN: HCPCS | Performed by: INTERNAL MEDICINE

## 2021-11-24 PROCEDURE — G8510 SCR DEP NEG, NO PLAN REQD: HCPCS | Performed by: INTERNAL MEDICINE

## 2021-11-24 PROCEDURE — G8400 PT W/DXA NO RESULTS DOC: HCPCS | Performed by: INTERNAL MEDICINE

## 2021-11-24 PROCEDURE — 36415 COLL VENOUS BLD VENIPUNCTURE: CPT

## 2021-11-24 PROCEDURE — G0463 HOSPITAL OUTPT CLINIC VISIT: HCPCS | Performed by: INTERNAL MEDICINE

## 2021-11-24 PROCEDURE — 80053 COMPREHEN METABOLIC PANEL: CPT

## 2021-11-24 PROCEDURE — G8417 CALC BMI ABV UP PARAM F/U: HCPCS | Performed by: INTERNAL MEDICINE

## 2021-11-24 PROCEDURE — G8427 DOCREV CUR MEDS BY ELIG CLIN: HCPCS | Performed by: INTERNAL MEDICINE

## 2021-11-24 PROCEDURE — 85610 PROTHROMBIN TIME: CPT

## 2021-11-24 NOTE — PROGRESS NOTES
Jessica Pringle MD, Nataly Adriel Briceño MD, MPH      Jeri Kaur, JULISA Logan, ACN-BC     April S Fady, [facility] Hospital[facility]   Schuyler Lino, FNP-CASEY Schaeffer, [facility]       Sowmya Julio [address]    at [address], [address] [place] [address], [facility].    [phone]    FAX: [address] [place] [address]    at [address], [address]    [phone]    FAX: [phone]       Patient Care Team:  Azael Maynard MD as PCP - General (Family Medicine)      Problem List  Date Reviewed: 12/11/2021          Codes Class Noted    Cirrhosis ([address] ICD-10-CM: K74.60  ICD-9-CM: 571.5  Unknown        Hypertension ICD-10-CM: I10  ICD-9-CM: 401.9  Unknown    Overview Signed 12/11/2021 10:59 AM by Carlos Chiu MD     well controlled with meds patient states             GERD (gastroesophageal reflux disease) ICD-10-CM: K21.9  ICD-9-CM: 530.81  Unknown        Fibromyalgia ICD-10-CM: M79.7  ICD-9-CM: 729.1  Unknown        Anasarca ICD-10-CM: R60.1  ICD-9-CM: 510. 3  Unknown        Ascites ICD-10-CM: R18.8  ICD-9-CM: 789.59  Unknown        Gastric varix ICD-10-CM: I86.4  ICD-9-CM: 456.8  Unknown        CKD (chronic kidney disease) ICD-10-CM: N18.9  ICD-9-CM: 642. 9  Unknown        Hypercholesterolemia ICD-10-CM: E78.00  ICD-9-CM: 272.0  Unknown        Hypothyroidism ICD-10-CM: E03.9  ICD-9-CM: 244.9  Unknown        Nuclear sclerosis of left eye ICD-10-CM: H25.12  ICD-9-CM: 366.16  4/15/2019        Total knee replacement status ICD-10-CM: H51.374  ICD-9-CM: V43.65  8/16/2017                The clinicians listed above have asked me to see Caitlyn Rucker in consultation regarding management of cirrhosis secondary to NAFLD.   All medical records sent by the referring physicians were reviewed including imaging studies     The patient is a 68 y.o.  female who was found to have chronic liver disease in 2002 and cirrhosis in 2011. She was living in [place] at that time and saw Mesha Sanon in [facility] in 8/2021. She has now moved to [facility] to [employer] with her son who also has fatty liver and is a patient at [place] [address]. An assessment of liver fibrosis with biopsy or elastography has not been performed. Serologic evaluation for markers of chronic liver disease was negative. The patient has developed the following complications of cirrhosis: Gastric varices, ascites, edema,     The patient has the following symptoms which could be attributed to the liver disorder:    fatigue,   swelling of the abdomen,   swelling of the lower extremities,     The patient is not experiencing the following symptoms which are commonly seen in this liver disorder:   diffuse abdominal pain,   nausea,   vomiting,   problems concentrating,   ematemesis,   hematochezia. The patient has limitations in functional activities which can be attributed to the liver disease and to other medical problems that are not related to the liver disease. ASSESSMENT AND PLAN:  Cirrhosis  The diagnosis of cirrhosis is based upon imaging, laboratory studies, complications of cirrhosis. Cirrhosis is thought to be secondary to NAFLD. The patient has normal liver function. The patient has never developed any complications of cirrhosis to date. The CTP is 10. Child class C. The MELD score is 22. Have performed laboratory testing to monitor liver function and degree of liver injury. This included BMP, hepatic panel, CBC with platelet count, INR. Liver transaminases are normal.  ALP is normal.  Liver function is depressed. The platelet count is depressed.        Serologic testing for causes of chronic liver disease was negative     Elevation in Ferritin  There is an elevation in ferritin with normal iron saturation. 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 The elevation in ferritin is the result of the HFE gene mutation and does not reflect iron overload. Phlebotomy therapy is not indicated. Ascites   Ascites developed for the first time in 7/2021. Ascites is refractory to current doses of diuretics because of kidney disease     She is not taking diuretics. Paracentesis will be performed as needed. The patient was counseled regarding the need to maintain sodium restriction and the types of foods containing high amounts of sodium to be avoided. Lower extremity edema  Edema is refractory to the current doses of diuretics because of kidney disease   The patient is not taking diuretics. Screening for Esophageal varices   The patient has gastric varices without prior bleeding. The patient is on a beta-blocker to reduce the risk of variceal hemorrhage. Gastric varices are at low risk for bleeding. Beta-blockers can increase the risk of CKD. Will stop the nadolol as this may help CKD. Hepatic encephalopathy   Overt HE has not developed to date. There is no reason for treatment with lactulose of xifaxan  There is no need to restrict dietary protein at this time. Thrombocytopenia   This is secondary to cirrhosis. There is no evidence of overt bleeding. No treatment is required. The platelet count is adequate for the patient to undergo procedures without the need for platelet transfusion or platelet growth factors. Anemia   This is due to multifactorial causes including portal hypertension with chronic GI blood loss   The most recent FE studies were from 11/2021. The serum ferritin is 941  The FE saturation is 50    Screening for Hepatocellular Carcinoma  HCC screening has recently been performed and does not suggest [address] The next liver imaging study will be performed in 2/2022.     Treatment of other medical problems in patients with chronic liver disease  There are no contraindications for the patient to take most medications that are necessary for treatment of other medical issues. The patient has cirrhrosis and should avoid taking NSAIDs which are associated with a higher rate of developing SYLVIE. The patient can take any medications utilized for treatment of DM, statins to treat hypercholesterolemia    Normal doses of acetaminophen, as recommended on the label of the bottle, are not hepatotoxic except in the setting of daily alcohol use, even in patients with cirrhosis and can be utilized for pain. Counseling for alcohol in patients with chronic liver disease  The patient was counseled regarding alcohol consumption and the effect of alcohol on chronic liver disease. The patient does not consume any significant amount of alcohol. Osteoporosis  The risk of osteoporosis is increased in patients with cirrhosis. DEXA bone density to assess for osteoporosis has not been performed. Vaccinations   Routine vaccinations against other bacterial and viral agents can be performed as indicated. Annual flu vaccination should be administered if indicated. End-of-life care  The patient has advanced cirrhosis and CKD and is frail. The patient does not want to enter hospice. She does want to remain out of the hospital for as long as possible but is willing to be admitted if she has to. ALLERGIES  Allergies   Allergen Reactions    Latex Hives    Acetaminophen Other (comments)     Can not take- has advanced liver disease.     Adhesive Tape-Silicones Other (comments)     Skin burns    Codeine Nausea and Vomiting and Other (comments)    Dilaudid [Hydromorphone] Nausea and Vomiting     Patient also complained of weakness    Doxycycline Hives and Nausea and Vomiting    Hydrocodone Nausea and Vomiting     Elevated blood pressure, effected her eyes, weakness    Oxycodone Itching and Nausea and Vomiting     Patient complained of vision disturbance    Penicillins Hives and Other (comments)     \" I go blind\"       MEDICATIONS  Current Outpatient Medications   Medication Sig    folic acid (FOLVITE) 1 mg tablet Take 1 mg by mouth daily.  nadolol (CORGARD) 20 mg tablet Take 5 mg by mouth daily. Indications: high blood pressure, Prevent Bleeding Varicose Vein in the Esophagus    Omeprazole delayed release (PRILOSEC D/R) 20 mg tablet Take 20 mg by mouth daily. Indications: gastroesophageal reflux disease, Medication Treatment for Healing Erosive Esophagitis    albuterol (PROVENTIL HFA, VENTOLIN HFA, PROAIR HFA) 90 mcg/actuation inhaler Take 1 Puff by inhalation every four (4) hours as needed for Wheezing. Indications: chronic bronchitis    levothyroxine (SYNTHROID) 50 mcg tablet Take 50 mcg by mouth Daily (before breakfast). Indications: hypothyroidism    diphenhydrAMINE (BENADRYL ALLERGY) 25 mg tablet Take 1 Tab by mouth every six (6) hours as needed for Itching.  loperamide (IMMODIUM) 2 mg tablet Take 2 mg by mouth three (3) times daily as needed for Diarrhea.  desoximetasone (TOPICORT) 0.05 % topical cream Apply  to affected area two (2) times daily as needed for Skin Irritation.  fluticasone (FLONASE) 50 mcg/actuation nasal spray 2 Sprays by Both Nostrils route as needed for Rhinitis.  gabapentin (NEURONTIN) 600 mg tablet Take 300 mg by mouth two (2) times daily as needed. Indications: Neuropathic Pain    pravastatin (PRAVACHOL) 10 mg tablet Take 10 mg by mouth nightly. Indications: high cholesterol    loratadine-pseudoephedrine (CLARITIN-D 24 HOUR)  mg per tablet Take 1 Tab by mouth as needed. No current facility-administered medications for this visit. SYSTEM REVIEW NOT RELATED TO LIVER DISEASE OR REVIEWED ABOVE:  Constitution systems: Negative for fever, chills, weight gain, weight loss. Eyes: Negative for visual changes.   ENT: Negative for sore throat, painful swallowing. Respiratory: Negative for cough, hemoptysis, SOB. Cardiology: Negative for chest pain, palpitations. GI:  Negative for constipation or diarrhea. : Negative for urinary frequency, dysuria, hematuria, nocturia. Skin: Negative for rash. Hematology: Negative for easy bruising, blood clots. Musculo-skelatal: Negative for back pain, muscle pain, weakness. Neurologic: Negative for headaches, dizziness, vertigo, memory problems not related to HE. Psychology: Negative for anxiety, depression. FAMILY HISTORY:  The patient has no knowledge of the father's medical condition. The patient has no knowledge of the mother's medical condition. There is a son with fatty liver. SOCIAL HISTORY:  The patient is . The patient has 4 children, and 4 grandchildren. The patient stopped using tobacco products in 1970s. The patient has never consumed significant amounts of alcohol. The patient used to work as a seamstress. PHYSICAL EXAMINATION:  Visit Vitals  /60 (BP 1 Location: Right lower arm, BP Patient Position: Sitting, BP Cuff Size: Adult)   Pulse 82   Temp 99 °F (37.2 °C)   Resp 14   Ht 5' 3\" (1.6 m)   Wt 208 lb (94.3 kg)   SpO2 98%   BMI 36.85 kg/m²     General: No acute distress. Eyes: Sclera anicteric. ENT: No oral lesions. Thyroid normal.  Nodes: No adenopathy. Skin: No spider angiomata. No jaundice. No palmar erythema. Respiratory: Lungs clear to auscultation. Cardiovascular: Regular heart rate. No murmurs. No JVD. Abdomen: Soft non-tender. Obese. No obvious ascites. Extremities: 3+ lower edema. No muscle wasting. No gross arthritic changes. Neurologic: Alert and oriented. Cranial nerves grossly intact. No asterixis. LABORATORY STUDIES:  Recent liver function panel, CBC with platelet count and BMP are not available. These studies will be performed. SEROLOGIES:  11/2021.   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 LIVER HISTOLOGY:  Not available or performed    ENDOSCOPIC PROCEDURES:  Not available or performed    RADIOLOGY:  8/2021. CT scan abdomen without IV contrast.  Changes consistent with cirrhosis. No liver mass lesions. No dilated bile ducts. Moderate ascites. OTHER TESTING:  Not available or performed    FOLLOW-UP:  All of the issues listed above in the Assessment and Plan were discussed with the patient. All questions were answered. The patient expressed a clear understanding of the above. [address] [address] in 4 weeks to review all data and determine the treatment plan.       Santos Veloz MD  [address] [address], [address] [place] [address] - [address]  [address] [place] [address]

## 2021-11-24 NOTE — Clinical Note
12/11/2021    Patient: David Vanegas   YOB: 1945   Date of Visit: 11/24/2021     Phyllis Nance MD  [address] [facility] [address]  Via Fax: [phone]    Dear Phyllis Nance MD,      Thank you for referring Ms. Juan Jose Matamoros to [address] [place] [address],[address] for evaluation. My notes for this consultation are attached. If you have questions, please do not hesitate to call me. I look forward to following your patient along with you.       Sincerely,    Emiliana Jasmine MD

## 2022-01-19 ENCOUNTER — OFFICE VISIT (OUTPATIENT)
Dept: HEMATOLOGY | Age: 77
End: 2022-01-19
Payer: MEDICARE

## 2022-01-19 VITALS
DIASTOLIC BLOOD PRESSURE: 40 MMHG | OXYGEN SATURATION: 96 % | HEART RATE: 84 BPM | TEMPERATURE: 97.7 F | SYSTOLIC BLOOD PRESSURE: 109 MMHG | BODY MASS INDEX: 39.5 KG/M2 | WEIGHT: 223 LBS

## 2022-01-19 DIAGNOSIS — R18.8 CIRRHOSIS OF LIVER WITH ASCITES, UNSPECIFIED HEPATIC CIRRHOSIS TYPE (HCC): Primary | ICD-10-CM

## 2022-01-19 DIAGNOSIS — K74.60 CIRRHOSIS OF LIVER WITH ASCITES, UNSPECIFIED HEPATIC CIRRHOSIS TYPE (HCC): Primary | ICD-10-CM

## 2022-01-19 DIAGNOSIS — E66.01 SEVERE OBESITY (BMI 35.0-35.9 WITH COMORBIDITY) (HCC): ICD-10-CM

## 2022-01-19 PROCEDURE — G8752 SYS BP LESS 140: HCPCS | Performed by: INTERNAL MEDICINE

## 2022-01-19 PROCEDURE — G8754 DIAS BP LESS 90: HCPCS | Performed by: INTERNAL MEDICINE

## 2022-01-19 PROCEDURE — 1101F PT FALLS ASSESS-DOCD LE1/YR: CPT | Performed by: INTERNAL MEDICINE

## 2022-01-19 PROCEDURE — 99215 OFFICE O/P EST HI 40 MIN: CPT | Performed by: INTERNAL MEDICINE

## 2022-01-19 PROCEDURE — G8536 NO DOC ELDER MAL SCRN: HCPCS | Performed by: INTERNAL MEDICINE

## 2022-01-19 PROCEDURE — 1090F PRES/ABSN URINE INCON ASSESS: CPT | Performed by: INTERNAL MEDICINE

## 2022-01-19 PROCEDURE — G8400 PT W/DXA NO RESULTS DOC: HCPCS | Performed by: INTERNAL MEDICINE

## 2022-01-19 PROCEDURE — G8427 DOCREV CUR MEDS BY ELIG CLIN: HCPCS | Performed by: INTERNAL MEDICINE

## 2022-01-19 PROCEDURE — G8510 SCR DEP NEG, NO PLAN REQD: HCPCS | Performed by: INTERNAL MEDICINE

## 2022-01-19 PROCEDURE — G0463 HOSPITAL OUTPT CLINIC VISIT: HCPCS | Performed by: INTERNAL MEDICINE

## 2022-01-19 PROCEDURE — G8417 CALC BMI ABV UP PARAM F/U: HCPCS | Performed by: INTERNAL MEDICINE

## 2022-01-19 RX ORDER — ESCITALOPRAM OXALATE 20 MG/1
20 TABLET ORAL DAILY
COMMUNITY

## 2022-01-19 NOTE — PROGRESS NOTES
[address] [facility], MD, Harvinder Zayas, Marlo Allan MD, MPH      Maura Mann, PA-CASEY Mccallum, [facility]     Graciela Shrestha, [facility]   SILAS Lo-CASEY Pandya, [facility]       Sowmya Julio [facility] 136    at [address]    [facility], [address] [place] [address] Joe Garza  22.    [phone]    FAX: [address] [place] [address] Avenue    [address], [address]    [phone]    FAX: [phone]       Patient Care Team:  Emmanuel Alatorre MD as PCP - General (Family Medicine)      Problem List  Date Reviewed: 12/11/2021          Codes Class Noted    Cirrhosis ([address] ICD-10-CM: K74.60  ICD-9-CM: 571.5  Unknown        Hypertension ICD-10-CM: I10  ICD-9-CM: 401.9  Unknown    Overview Signed 12/11/2021 10:59 AM by Stacey Justice MD     well controlled with meds patient states             GERD (gastroesophageal reflux disease) ICD-10-CM: K21.9  ICD-9-CM: 530.81  Unknown        Fibromyalgia ICD-10-CM: M79.7  ICD-9-CM: 729.1  Unknown        Anasarca ICD-10-CM: R60.1  ICD-9-CM: 036. 3  Unknown        Ascites ICD-10-CM: R18.8  ICD-9-CM: 789.59  Unknown        Gastric varix ICD-10-CM: I86.4  ICD-9-CM: 456.8  Unknown        CKD (chronic kidney disease) ICD-10-CM: N18.9  ICD-9-CM: 805. 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 The active problem list, all pertinent past medical history, medications, endoscopic studies, radiologic findings and laboratory findings related to the liver disorder were reviewed and discussed with the patient. Serologic evaluation for markers of chronic liver disease was negative. The patient has developed the following complications of cirrhosis: Gastric varices, ascites, edema,     The patient has the following symptoms which could be attributed to the liver disorder:    fatigue,   swelling of the abdomen,   swelling of the lower extremities,     The patient is not experiencing the following symptoms which are commonly seen in this liver disorder:   diffuse abdominal pain,   nausea,   vomiting,   problems concentrating,   hematemesis,   hematochezia. The patient has limitations in functional activities which can be attributed to the liver disease and to other medical problems that are not related to the liver disease. Since the last office appointment:  She has put on 30 pounds of water weight and has more swelling of the lower extremities. It is now hard for her to walk. Will have her come to [facility] ED on Sunday for hospitalization and treatment for anasarca. ASSESSMENT AND PLAN:  Cirrhosis  The diagnosis of cirrhosis is based upon imaging, laboratory studies, complications of cirrhosis. Cirrhosis is thought to be secondary to NAFLD. The patient has normal liver function. The patient has never developed any complications of cirrhosis to date. The CTP is 10. Child class C. The MELD score is 22. Have performed laboratory testing to monitor liver function and degree of liver injury. This included BMP, hepatic panel, CBC with platelet count, INR. Liver transaminases are normal.  ALP is normal.  Liver function is depressed. The platelet count is depressed.        Serologic testing for causes of chronic liver disease was negative     Elevation in Ferritin  There is an elevation in ferritin with normal iron saturation. 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 The elevation in ferritin is the result of the HFE gene mutation and does not reflect iron overload. Phlebotomy therapy is not indicated. Ascites   Ascites developed for the first time in 7/2021. Ascites is refractory to current doses of diuretics because of kidney disease     She is not taking diuretics. Paracentesis will be performed as needed. The patient was counseled regarding the need to maintain sodium restriction and the types of foods containing high amounts of sodium to be avoided. Lower extremity edema  Edema is refractory to the current doses of diuretics because of kidney disease   The patient is not taking diuretics. Screening for Esophageal varices   The patient has gastric varices without prior bleeding. The patient is on a beta-blocker to reduce the risk of variceal hemorrhage. Gastric varices are at low risk for bleeding. Beta-blockers can increase the risk of CKD. Will stop the nadolol as this may help CKD. Hepatic encephalopathy   Overt HE has not developed to date. There is no reason for treatment with lactulose of xifaxan  There is no need to restrict dietary protein at this time. Thrombocytopenia   This is secondary to cirrhosis. There is no evidence of overt bleeding. No treatment is required. The platelet count is adequate for the patient to undergo procedures without the need for platelet transfusion or platelet growth factors. Anemia   This is due to multifactorial causes including portal hypertension with chronic GI blood loss   The most recent FE studies were from 11/2021. The serum ferritin is 941  The FE saturation is 50  Will need EGD to assess for UGI blood loss. Screening for Hepatocellular Carcinoma  HCC screening has recently been performed and does not suggest [address]     The next liver imaging study will be performed in 2/2022. Treatment of other medical problems in patients with chronic liver disease  There are no contraindications for the patient to take most medications that are necessary for treatment of other medical issues. The patient has cirrhrosis and should avoid taking NSAIDs which are associated with a higher rate of developing SYLVIE. The patient can take any medications utilized for treatment of DM, statins to treat hypercholesterolemia    Normal doses of acetaminophen, as recommended on the label of the bottle, are not hepatotoxic except in the setting of daily alcohol use, even in patients with cirrhosis and can be utilized for pain. Counseling for alcohol in patients with chronic liver disease  The patient was counseled regarding alcohol consumption and the effect of alcohol on chronic liver disease. The patient does not consume any significant amount of alcohol. Osteoporosis  The risk of osteoporosis is increased in patients with cirrhosis. DEXA bone density to assess for osteoporosis has not been performed. Vaccinations   Routine vaccinations against other bacterial and viral agents can be performed as indicated. Annual flu vaccination should be administered if indicated. End-of-life care  The patient has advanced cirrhosis and CKD and is frail. The patient does not want to enter hospice. She does want to remain out of the hospital for as long as possible but is willing to be admitted if she has to. ALLERGIES  Allergies   Allergen Reactions    Latex Hives    Acetaminophen Other (comments)     Can not take- has advanced liver disease.     Adhesive Tape-Silicones Other (comments)     Skin burns    Codeine Nausea and Vomiting and Other (comments)    Dilaudid [Hydromorphone] Nausea and Vomiting     Patient also complained of weakness    Doxycycline Hives and Nausea and Vomiting    Hydrocodone Nausea and Vomiting     Elevated blood pressure, effected her eyes, weakness    Oxycodone Itching and Nausea and Vomiting     Patient complained of vision disturbance    Penicillins Hives and Other (comments)     \" I go blind\"       MEDICATIONS  Current Outpatient Medications   Medication Sig    escitalopram oxalate (Lexapro) 20 mg tablet Take 20 mg by mouth daily.  folic acid (FOLVITE) 1 mg tablet Take 1 mg by mouth daily.  Omeprazole delayed release (PRILOSEC D/R) 20 mg tablet Take 20 mg by mouth daily. Indications: gastroesophageal reflux disease, Medication Treatment for Healing Erosive Esophagitis    albuterol (PROVENTIL HFA, VENTOLIN HFA, PROAIR HFA) 90 mcg/actuation inhaler Take 1 Puff by inhalation every four (4) hours as needed for Wheezing. Indications: chronic bronchitis    levothyroxine (SYNTHROID) 50 mcg tablet Take 50 mcg by mouth Daily (before breakfast). Indications: hypothyroidism    diphenhydrAMINE (BENADRYL ALLERGY) 25 mg tablet Take 1 Tab by mouth every six (6) hours as needed for Itching.  loperamide (IMMODIUM) 2 mg tablet Take 2 mg by mouth three (3) times daily as needed for Diarrhea.  desoximetasone (TOPICORT) 0.05 % topical cream Apply  to affected area two (2) times daily as needed for Skin Irritation.  fluticasone (FLONASE) 50 mcg/actuation nasal spray 2 Sprays by Both Nostrils route as needed for Rhinitis.  gabapentin (NEURONTIN) 600 mg tablet Take 300 mg by mouth two (2) times daily as needed. Indications: Neuropathic Pain    pravastatin (PRAVACHOL) 10 mg tablet Take 10 mg by mouth nightly. Indications: high cholesterol    loratadine-pseudoephedrine (CLARITIN-D 24 HOUR)  mg per tablet Take 1 Tab by mouth as needed.  nadolol (CORGARD) 20 mg tablet Take 5 mg by mouth daily. Indications: high blood pressure, Prevent Bleeding Varicose Vein in the Esophagus (Patient not taking: Reported on 1/19/2022)     No current facility-administered medications for this visit.        SYSTEM REVIEW NOT RELATED TO LIVER DISEASE OR REVIEWED ABOVE:  Constitution systems: Negative for fever, chills, weight gain, weight loss. Eyes: Negative for visual changes. ENT: Negative for sore throat, painful swallowing. Respiratory: Negative for cough, hemoptysis, SOB. Cardiology: Negative for chest pain, palpitations. GI:  Negative for constipation or diarrhea. : Negative for urinary frequency, dysuria, hematuria, nocturia. Skin: Negative for rash. Hematology: Negative for easy bruising, blood clots. Musculo-skelatal: Negative for back pain, muscle pain, weakness. Neurologic: Negative for headaches, dizziness, vertigo, memory problems not related to HE. Psychology: Negative for anxiety, depression. FAMILY HISTORY:  The patient has no knowledge of the father's medical condition. The patient has no knowledge of the mother's medical condition. There is a son with fatty liver. SOCIAL HISTORY:  The patient is . The patient has 4 children, and 4 grandchildren. The patient stopped using tobacco products in 1970s. The patient has never consumed significant amounts of alcohol. The patient used to work as a seamstress. PHYSICAL EXAMINATION:  Visit Vitals  BP (!) 109/40   Pulse 84   Temp 97.7 °F (36.5 °C) (Tympanic)   Wt 223 lb (101.2 kg)   SpO2 96%   BMI 39.50 kg/m²     General: No acute distress. Eyes: Sclera anicteric. ENT: No oral lesions. Thyroid normal.  Nodes: No adenopathy. Skin: No spider angiomata. No jaundice. No palmar erythema. Respiratory: Lungs clear to auscultation. Cardiovascular: Regular heart rate. No murmurs. No JVD. Abdomen: Soft non-tender. Obese. Some ascites may be present. Extremities: 4+ lower edema. No muscle wasting. Neurologic: Alert and oriented. Cranial nerves grossly intact. No asterixis.     LABORATORY STUDIES:  Liver [place] of [address] & Units 11/24/2021   WBC 4.6 - 13.2 K/uL 3.8 (L)   ANC 1.8 - 8.0 K/UL 2.4   HGB 12.0 - 16.0 g/dL 6.7 (L)  - 420 K/uL 54 (L)   INR 0.8 - 1.2   1.7 (H)   AST 10 - 38 U/L 34   ALT 13 - 56 U/L 19   Alk Phos 45 - 117 U/L 87   Bili, Total 0.2 - 1.0 MG/DL 2.3 (H)   Albumin 3.4 - 5.0 g/dL 2.0 (L)   BUN 7.0 - 18 MG/DL 28 (H)   Creat 0.6 - 1.3 MG/DL 1.95 (H)   Na 136 - 145 mmol/L 140   K 3.5 - 5.5 mmol/L 3.9   Cl 100 - 111 mmol/L 109   CO2 21 - 32 mmol/L 22   Glucose 74 - 99 mg/dL 88     SEROLOGIES:  11/2021. 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 LIVER HISTOLOGY:  Not available or performed    ENDOSCOPIC PROCEDURES:  Not available or performed    RADIOLOGY:  8/2021. CT scan abdomen without IV contrast.  Changes consistent with cirrhosis. No liver mass lesions. No dilated bile ducts. Moderate ascites. OTHER TESTING:  Not available or performed    FOLLOW-UP:  All of the issues listed above in the Assessment and Plan were discussed with the patient. All questions were answered. The patient expressed a clear understanding of the above. [facility] 87 in 4 weeks after the hospitalization.         Olegario Hinojosa MD  [address]  [address] [place] [address] Soraya Sanchez, [address]  12 [facility]

## 2022-01-19 NOTE — Clinical Note
1/25/2022    Patient: Tasia Benavides   YOB: 1945   Date of Visit: 1/19/2022     Tomás Tony MD  [address] [place] [id number]  Via Fax: [phone]    Dear Tomás Tony MD,      Thank you for referring Ms. Harshad West to [address] [place] [address],[address] for evaluation. My notes for this consultation are attached. If you have questions, please do not hesitate to call me. I look forward to following your patient along with you.       Sincerely,    Ronny Rico MD

## 2022-01-23 ENCOUNTER — HOSPITAL ENCOUNTER (INPATIENT)
Age: 77
LOS: 5 days | Discharge: HOSPICE | DRG: 432 | End: 2022-01-28
Attending: EMERGENCY MEDICINE | Admitting: HOSPITALIST
Payer: MEDICARE

## 2022-01-23 DIAGNOSIS — K74.60 CIRRHOSIS OF LIVER WITH ASCITES, UNSPECIFIED HEPATIC CIRRHOSIS TYPE (HCC): ICD-10-CM

## 2022-01-23 DIAGNOSIS — R60.1 ANASARCA: Primary | ICD-10-CM

## 2022-01-23 DIAGNOSIS — K31.89 PORTAL HYPERTENSIVE GASTROPATHY (HCC): ICD-10-CM

## 2022-01-23 DIAGNOSIS — K76.7 HEPATORENAL SYNDROME (HCC): ICD-10-CM

## 2022-01-23 DIAGNOSIS — N18.30 STAGE 3 CHRONIC KIDNEY DISEASE, UNSPECIFIED WHETHER STAGE 3A OR 3B CKD (HCC): ICD-10-CM

## 2022-01-23 DIAGNOSIS — R18.8 CIRRHOSIS OF LIVER WITH ASCITES, UNSPECIFIED HEPATIC CIRRHOSIS TYPE (HCC): ICD-10-CM

## 2022-01-23 DIAGNOSIS — D64.9 ANEMIA, UNSPECIFIED TYPE: ICD-10-CM

## 2022-01-23 DIAGNOSIS — N17.9 AKI (ACUTE KIDNEY INJURY) (HCC): ICD-10-CM

## 2022-01-23 DIAGNOSIS — R18.8 OTHER ASCITES: ICD-10-CM

## 2022-01-23 DIAGNOSIS — K76.6 PORTAL HYPERTENSIVE GASTROPATHY (HCC): ICD-10-CM

## 2022-01-23 DIAGNOSIS — I86.4 GASTRIC VARIX: ICD-10-CM

## 2022-01-23 DIAGNOSIS — D53.9 MACROCYTIC ANEMIA: ICD-10-CM

## 2022-01-23 PROBLEM — Z28.9 COVID-19 VACCINE DOSE NOT ADMINISTERED: Status: ACTIVE | Noted: 2022-01-23

## 2022-01-23 LAB
ALBUMIN SERPL-MCNC: 2.2 G/DL (ref 3.4–5)
ALBUMIN/GLOB SERPL: 0.7 {RATIO} (ref 0.8–1.7)
ALP SERPL-CCNC: 94 U/L (ref 45–117)
ALT SERPL-CCNC: 16 U/L (ref 13–56)
AMMONIA PLAS-SCNC: 57 UMOL/L (ref 11–32)
ANION GAP SERPL CALC-SCNC: 7 MMOL/L (ref 3–18)
AST SERPL-CCNC: 44 U/L (ref 10–38)
BASOPHILS # BLD: 0 K/UL (ref 0–0.1)
BASOPHILS NFR BLD: 1 % (ref 0–2)
BILIRUB SERPL-MCNC: 3 MG/DL (ref 0.2–1)
BUN SERPL-MCNC: 19 MG/DL (ref 7–18)
BUN/CREAT SERPL: 9 (ref 12–20)
CALCIUM SERPL-MCNC: 7.5 MG/DL (ref 8.5–10.1)
CHLORIDE SERPL-SCNC: 107 MMOL/L (ref 100–111)
CO2 SERPL-SCNC: 24 MMOL/L (ref 21–32)
CREAT SERPL-MCNC: 2.06 MG/DL (ref 0.6–1.3)
DIFFERENTIAL METHOD BLD: ABNORMAL
EOSINOPHIL # BLD: 0.2 K/UL (ref 0–0.4)
EOSINOPHIL NFR BLD: 5 % (ref 0–5)
ERYTHROCYTE [DISTWIDTH] IN BLOOD BY AUTOMATED COUNT: 17.9 % (ref 11.6–14.5)
FOLATE SERPL-MCNC: >20 NG/ML (ref 3.1–17.5)
GLOBULIN SER CALC-MCNC: 3.1 G/DL (ref 2–4)
GLUCOSE SERPL-MCNC: 101 MG/DL (ref 74–99)
HCT VFR BLD AUTO: 19.8 % (ref 35–45)
HEMOCCULT STL QL: NEGATIVE
HGB BLD-MCNC: 6.3 G/DL (ref 12–16)
IMM GRANULOCYTES # BLD AUTO: 0 K/UL (ref 0–0.04)
IMM GRANULOCYTES NFR BLD AUTO: 0 % (ref 0–0.5)
IRON SATN MFR SERPL: 63 % (ref 20–50)
IRON SERPL-MCNC: 104 UG/DL (ref 50–175)
LIPASE SERPL-CCNC: 163 U/L (ref 73–393)
LYMPHOCYTES # BLD: 1 K/UL (ref 0.9–3.6)
LYMPHOCYTES NFR BLD: 21 % (ref 21–52)
MCH RBC QN AUTO: 35.6 PG (ref 24–34)
MCHC RBC AUTO-ENTMCNC: 31.8 G/DL (ref 31–37)
MCV RBC AUTO: 111.9 FL (ref 78–100)
MONOCYTES # BLD: 0.6 K/UL (ref 0.05–1.2)
MONOCYTES NFR BLD: 12 % (ref 3–10)
NEUTS SEG # BLD: 2.9 K/UL (ref 1.8–8)
NEUTS SEG NFR BLD: 61 % (ref 40–73)
NRBC # BLD: 0 K/UL (ref 0–0.01)
NRBC BLD-RTO: 0 PER 100 WBC
PLATELET # BLD AUTO: 64 K/UL (ref 135–420)
PLATELET COMMENTS,PCOM: ABNORMAL
PMV BLD AUTO: 9.7 FL (ref 9.2–11.8)
POTASSIUM SERPL-SCNC: 4.1 MMOL/L (ref 3.5–5.5)
PROT SERPL-MCNC: 5.3 G/DL (ref 6.4–8.2)
RBC # BLD AUTO: 1.77 M/UL (ref 4.2–5.3)
RBC MORPH BLD: ABNORMAL
SODIUM SERPL-SCNC: 138 MMOL/L (ref 136–145)
TIBC SERPL-MCNC: 165 UG/DL (ref 250–450)
TSH SERPL DL<=0.05 MIU/L-ACNC: 9.94 UIU/ML (ref 0.36–3.74)
VIT B12 SERPL-MCNC: >2000 PG/ML (ref 211–911)
WBC # BLD AUTO: 4.7 K/UL (ref 4.6–13.2)

## 2022-01-23 PROCEDURE — 83540 ASSAY OF IRON: CPT

## 2022-01-23 PROCEDURE — 74011250637 HC RX REV CODE- 250/637: Performed by: HOSPITALIST

## 2022-01-23 PROCEDURE — 99284 EMERGENCY DEPT VISIT MOD MDM: CPT

## 2022-01-23 PROCEDURE — 65270000029 HC RM PRIVATE

## 2022-01-23 PROCEDURE — 82140 ASSAY OF AMMONIA: CPT

## 2022-01-23 PROCEDURE — 84443 ASSAY THYROID STIM HORMONE: CPT

## 2022-01-23 PROCEDURE — 80053 COMPREHEN METABOLIC PANEL: CPT

## 2022-01-23 PROCEDURE — 85025 COMPLETE CBC W/AUTO DIFF WBC: CPT

## 2022-01-23 PROCEDURE — 74011250636 HC RX REV CODE- 250/636: Performed by: HOSPITALIST

## 2022-01-23 PROCEDURE — P9047 ALBUMIN (HUMAN), 25%, 50ML: HCPCS | Performed by: HOSPITALIST

## 2022-01-23 PROCEDURE — 83690 ASSAY OF LIPASE: CPT

## 2022-01-23 PROCEDURE — 74011000250 HC RX REV CODE- 250: Performed by: HOSPITALIST

## 2022-01-23 PROCEDURE — 82272 OCCULT BLD FECES 1-3 TESTS: CPT

## 2022-01-23 PROCEDURE — 82607 VITAMIN B-12: CPT

## 2022-01-23 RX ORDER — SODIUM CHLORIDE 0.9 % (FLUSH) 0.9 %
5-40 SYRINGE (ML) INJECTION EVERY 8 HOURS
Status: DISCONTINUED | OUTPATIENT
Start: 2022-01-23 | End: 2022-01-28 | Stop reason: HOSPADM

## 2022-01-23 RX ORDER — FACIAL-BODY WIPES
10 EACH TOPICAL DAILY PRN
Status: DISCONTINUED | OUTPATIENT
Start: 2022-01-23 | End: 2022-01-28 | Stop reason: HOSPADM

## 2022-01-23 RX ORDER — ESCITALOPRAM OXALATE 10 MG/1
20 TABLET ORAL DAILY
Status: DISCONTINUED | OUTPATIENT
Start: 2022-01-24 | End: 2022-01-28 | Stop reason: HOSPADM

## 2022-01-23 RX ORDER — NYSTATIN 100000 [USP'U]/G
POWDER TOPICAL 2 TIMES DAILY
Status: DISCONTINUED | OUTPATIENT
Start: 2022-01-23 | End: 2022-01-28 | Stop reason: HOSPADM

## 2022-01-23 RX ORDER — PANTOPRAZOLE SODIUM 40 MG/1
40 TABLET, DELAYED RELEASE ORAL
Status: DISCONTINUED | OUTPATIENT
Start: 2022-01-24 | End: 2022-01-24

## 2022-01-23 RX ORDER — ALBUTEROL SULFATE 0.83 MG/ML
2.5 SOLUTION RESPIRATORY (INHALATION)
Status: DISCONTINUED | OUTPATIENT
Start: 2022-01-23 | End: 2022-01-26

## 2022-01-23 RX ORDER — ALBUTEROL SULFATE 90 UG/1
1 AEROSOL, METERED RESPIRATORY (INHALATION)
Status: DISCONTINUED | OUTPATIENT
Start: 2022-01-23 | End: 2022-01-23 | Stop reason: SDUPTHER

## 2022-01-23 RX ORDER — ALBUMIN HUMAN 250 G/1000ML
25 SOLUTION INTRAVENOUS EVERY 6 HOURS
Status: DISCONTINUED | OUTPATIENT
Start: 2022-01-23 | End: 2022-01-28 | Stop reason: HOSPADM

## 2022-01-23 RX ORDER — PRAVASTATIN SODIUM 20 MG/1
10 TABLET ORAL
Status: DISCONTINUED | OUTPATIENT
Start: 2022-01-23 | End: 2022-01-28 | Stop reason: HOSPADM

## 2022-01-23 RX ORDER — PROMETHAZINE HYDROCHLORIDE 25 MG/1
12.5 TABLET ORAL
Status: DISCONTINUED | OUTPATIENT
Start: 2022-01-23 | End: 2022-01-28 | Stop reason: HOSPADM

## 2022-01-23 RX ORDER — FOLIC ACID 1 MG/1
1 TABLET ORAL DAILY
Status: DISCONTINUED | OUTPATIENT
Start: 2022-01-24 | End: 2022-01-28 | Stop reason: HOSPADM

## 2022-01-23 RX ORDER — SODIUM CHLORIDE 0.9 % (FLUSH) 0.9 %
5-40 SYRINGE (ML) INJECTION AS NEEDED
Status: DISCONTINUED | OUTPATIENT
Start: 2022-01-23 | End: 2022-01-28 | Stop reason: HOSPADM

## 2022-01-23 RX ORDER — LEVOTHYROXINE SODIUM 75 UG/1
75 TABLET ORAL
Status: DISCONTINUED | OUTPATIENT
Start: 2022-01-24 | End: 2022-01-28 | Stop reason: HOSPADM

## 2022-01-23 RX ORDER — ONDANSETRON 2 MG/ML
4 INJECTION INTRAMUSCULAR; INTRAVENOUS
Status: DISCONTINUED | OUTPATIENT
Start: 2022-01-23 | End: 2022-01-28 | Stop reason: HOSPADM

## 2022-01-23 RX ORDER — LORATADINE 10 MG/1
10 TABLET ORAL DAILY
COMMUNITY

## 2022-01-23 RX ORDER — GABAPENTIN 300 MG/1
300 CAPSULE ORAL DAILY
Status: DISCONTINUED | OUTPATIENT
Start: 2022-01-24 | End: 2022-01-28 | Stop reason: HOSPADM

## 2022-01-23 RX ADMIN — SODIUM CHLORIDE, PRESERVATIVE FREE 10 ML: 5 INJECTION INTRAVENOUS at 22:03

## 2022-01-23 RX ADMIN — ALBUMIN (HUMAN) 25 G: 0.25 INJECTION, SOLUTION INTRAVENOUS at 17:57

## 2022-01-23 RX ADMIN — PRAVASTATIN SODIUM 10 MG: 20 TABLET ORAL at 22:00

## 2022-01-23 NOTE — ED PROVIDER NOTES
EMERGENCY DEPARTMENT HISTORY AND PHYSICAL EXAM      Date: 1/23/2022  Patient Name: My Alex    History of Presenting Illness     Chief Complaint   Patient presents with    Chronic Liver Disease       History Provided By: Patient    History (Context): Evelyn Kathleen is a 68 y.o. female with nonalcoholic liver cirrhosis who was sent here by her hepatologist, Dr. Yin Carrero for albumin administration to help take off and for paracentesis procedure. Patient is not complaining of any abdominal pain at this time, nausea or vomiting, fevers or chills, change in stools. Denies increase in jaundice or scleral icterus. Never had a paracentesis procedure performed. PCP: Destini Gardiner MD    Current Outpatient Medications   Medication Sig Dispense Refill    loratadine (Claritin) 10 mg tablet Take 10 mg by mouth daily.  escitalopram oxalate (Lexapro) 20 mg tablet Take 20 mg by mouth daily.  folic acid (FOLVITE) 1 mg tablet Take 1 mg by mouth daily.  nadolol (CORGARD) 20 mg tablet Take 5 mg by mouth daily. Indications: high blood pressure, Prevent Bleeding Varicose Vein in the Esophagus (Patient not taking: Reported on 1/19/2022)      Omeprazole delayed release (PRILOSEC D/R) 20 mg tablet Take 20 mg by mouth daily. Indications: gastroesophageal reflux disease, Medication Treatment for Healing Erosive Esophagitis      albuterol (PROVENTIL HFA, VENTOLIN HFA, PROAIR HFA) 90 mcg/actuation inhaler Take 1 Puff by inhalation every four (4) hours as needed for Wheezing. Indications: chronic bronchitis      levothyroxine (SYNTHROID) 50 mcg tablet Take 75 mcg by mouth Daily (before breakfast). Indications: a condition with low thyroid hormone levels      diphenhydrAMINE (BENADRYL ALLERGY) 25 mg tablet Take 1 Tab by mouth every six (6) hours as needed for Itching. 60 Tab 1    loperamide (IMMODIUM) 2 mg tablet Take 2 mg by mouth three (3) times daily as needed for Diarrhea.       desoximetasone (TOPICORT) 0.05 % topical cream Apply  to affected area two (2) times daily as needed for Skin Irritation.  fluticasone (FLONASE) 50 mcg/actuation nasal spray 2 Sprays by Both Nostrils route as needed for Rhinitis.  gabapentin (NEURONTIN) 600 mg tablet Take 300 mg by mouth daily. Indications: neuropathic pain      pravastatin (PRAVACHOL) 10 mg tablet Take 10 mg by mouth nightly. Indications: high cholesterol      loratadine-pseudoephedrine (CLARITIN-D 24 HOUR)  mg per tablet Take 1 Tab by mouth as needed. Past History     Past Medical History:  Past Medical History:   Diagnosis Date    Fibromyalgia     GERD (gastroesophageal reflux disease)     Hypertension     well controlled with meds patient states    Liver disease        Past Surgical History:  Past Surgical History:   Procedure Laterality Date    HX BREAST LUMPECTOMY      bilateral    HX CATARACT REMOVAL Right 2016    HX DILATION AND CURETTAGE      HX GI      colonoscopy, EGD    HX HEENT      sinus surgery    HX KNEE REPLACEMENT Right 2017    HX MOHS PROCEDURES      bilateral    HX ORTHOPAEDIC      right foot surgery     HX ORTHOPAEDIC      right knee surgery     HX ORTHOPAEDIC      bilateral carpal tunnel release     HX ORTHOPAEDIC Left 2017    foot    HX OTHER SURGICAL      removal of fatty tumor back and abdomen    HX ROTATOR CUFF REPAIR Bilateral     HX TONSILLECTOMY         Family History:  No family history on file. Social History:  Social History     Tobacco Use    Smoking status: Former Smoker     Quit date:      Years since quittin.0    Smokeless tobacco: Never Used   Substance Use Topics    Alcohol use: No    Drug use: No       Allergies: Allergies   Allergen Reactions    Latex Hives    Acetaminophen Other (comments)     Can not take- has advanced liver disease.     Adhesive Tape-Silicones Other (comments)     Skin burns    Aspirin Other (comments)     Dr. Kaitlin Polanco does not want pt to take it     Codeine Nausea and Vomiting and Other (comments)    Dilaudid [Hydromorphone] Nausea and Vomiting     Patient also complained of weakness    Doxycycline Hives and Nausea and Vomiting    Hydrocodone Nausea and Vomiting     Elevated blood pressure, effected her eyes, weakness    Oxycodone Itching and Nausea and Vomiting     Patient complained of vision disturbance    Penicillins Hives and Other (comments)     \" I go blind\"    Sulfa (Sulfonamide Antibiotics) Unknown (comments)         Review of Systems   Gen: Denies fever, chills, fatigue  HEENT: Denies congestion, sore throat, vision changes. Cardiac: Denies chest pain, palpitations  Pulm: Denies cough, shortness of breath  GI:  Positive for abdominal distention Denies abdominal pain nausea, vomiting, recent change in stools  : Denies change in urine stream.  Neuro: Denies headache, dizziness. Ext: Denies peripheral edema. Skin: Positive for jaundice        Physical Exam     Vitals:    01/23/22 1324   BP: (!) 129/52   Pulse: 83   Resp: 16   Temp: 98 °F (36.7 °C)   SpO2: 99%   Weight: 101.2 kg (223 lb)   Height: 5' 3\" (1.6 m)       Gen: alert and oriented, in no acute distress. HEENT: Normocephalic,  scleral icterus  Cardiovascular: Normal rate, regular rhythm, no murmurs, rubs, gallops. Pulses intact and equal distally. Pulmonary: No respiratory distress. No stridor. Clear lungs. ABD: Soft, mildly distended, nontender to palpation, no guarding, no masses noted. Neuro: Alert. Normal speech. Normal mentation. Psych: Normal thought content and thought processes. : No CVA tenderness  EXT:  generalized, nonpitting edema to upper and lower extremities  Skin: Warm and well-perfused.   Jaundice        Diagnostic Study Results     Labs -     Recent Results (from the past 12 hour(s))   METABOLIC PANEL, COMPREHENSIVE    Collection Time: 01/23/22  1:44 PM   Result Value Ref Range    Sodium 138 136 - 145 mmol/L    Potassium 4.1 3.5 - 5.5 mmol/L    Chloride 107 100 - 111 mmol/L    CO2 24 21 - 32 mmol/L    Anion gap 7 3.0 - 18 mmol/L    Glucose 101 (H) 74 - 99 mg/dL    BUN 19 (H) 7.0 - 18 MG/DL    Creatinine 2.06 (H) 0.6 - 1.3 MG/DL    BUN/Creatinine ratio 9 (L) 12 - 20      GFR est AA 28 (L) >60 ml/min/1.73m2    GFR est non-AA 23 (L) >60 ml/min/1.73m2    Calcium 7.5 (L) 8.5 - 10.1 MG/DL    Bilirubin, total 3.0 (H) 0.2 - 1.0 MG/DL    ALT (SGPT) 16 13 - 56 U/L    AST (SGOT) 44 (H) 10 - 38 U/L    Alk. phosphatase 94 45 - 117 U/L    Protein, total 5.3 (L) 6.4 - 8.2 g/dL    Albumin 2.2 (L) 3.4 - 5.0 g/dL    Globulin 3.1 2.0 - 4.0 g/dL    A-G Ratio 0.7 (L) 0.8 - 1.7     LIPASE    Collection Time: 01/23/22  1:44 PM   Result Value Ref Range    Lipase 163 73 - 393 U/L   CBC WITH AUTOMATED DIFF    Collection Time: 01/23/22  1:44 PM   Result Value Ref Range    WBC 4.7 4.6 - 13.2 K/uL    RBC 1.77 (L) 4.20 - 5.30 M/uL    HGB 6.3 (L) 12.0 - 16.0 g/dL    HCT 19.8 (L) 35.0 - 45.0 %    .9 (H) 78.0 - 100.0 FL    MCH 35.6 (H) 24.0 - 34.0 PG    MCHC 31.8 31.0 - 37.0 g/dL    RDW 17.9 (H) 11.6 - 14.5 %    PLATELET 64 (L) 396 - 420 K/uL    MPV 9.7 9.2 - 11.8 FL    NRBC 0.0 0  WBC    ABSOLUTE NRBC 0.00 0.00 - 0.01 K/uL    NEUTROPHILS 61 40 - 73 %    LYMPHOCYTES 21 21 - 52 %    MONOCYTES 12 (H) 3 - 10 %    EOSINOPHILS 5 0 - 5 %    BASOPHILS 1 0 - 2 %    IMMATURE GRANULOCYTES 0 0.0 - 0.5 %    ABS. NEUTROPHILS 2.9 1.8 - 8.0 K/UL    ABS. LYMPHOCYTES 1.0 0.9 - 3.6 K/UL    ABS. MONOCYTES 0.6 0.05 - 1.2 K/UL    ABS. EOSINOPHILS 0.2 0.0 - 0.4 K/UL    ABS. BASOPHILS 0.0 0.0 - 0.1 K/UL    ABS. IMM.  GRANS. 0.0 0.00 - 0.04 K/UL    DF AUTOMATED      PLATELET COMMENTS DECREASED PLATELETS      RBC COMMENTS MACROCYTOSIS  2+        RBC COMMENTS OVALOCYTES  2+        RBC COMMENTS ANISOCYTOSIS  2+        RBC COMMENTS SPHEROCYTES  1+       AMMONIA    Collection Time: 01/23/22  2:23 PM   Result Value Ref Range    Ammonia 57 (H) 11 - 32 UMOL/L   OCCULT BLOOD, STOOL Collection Time: 01/23/22  3:00 PM   Result Value Ref Range    Occult blood, stool Negative NEG         Radiologic Studies -   No orders to display     CT Results  (Last 48 hours)    None        CXR Results  (Last 48 hours)    None            Medical Decision Making   I am the first provider for this patient. I reviewed the vital signs, available nursing notes, past medical history, past surgical history, family history and social history. Vital Signs-Reviewed the patient's vital signs. Records Reviewed: By myself personally on initial evaluation    MDM:   Patient's clinical presentation most consistent with anasarca likely secondary to chronic liver disease, cirrhosis. Low suspicion for spontaneous bacterial peritonitis, acute congestive heart failure, sepsis. Chronic anemia likely secondary to liver disease versus other etiology. Hemoccult negative, no complaints of black or bloody stools or emesis, thus low suspicion for acute GI bleed. Plan:  Admit to medical floor, IV albumin infusions, Dr. Perla Kaufman to evaluate patient tomorrow. Orders as below:  Orders Placed This Encounter    COMPREHENSIVE METABOLIC PANEL    LIPASE    CBC WITH AUTOMATED DIFF    AMMONIA    OCCULT BLOOD, STOOL    loratadine (Claritin) 10 mg tablet    INITIAL PHYSICIAN ORDER: INPATIENT Medical; 3. Patient receiving treatment that can only be provided in an inpatient setting (further clarification in H&P documentation)        Patient's condition upon disposition: Stable    Disposition:  Admission to medical floor         Follow-up Information    None         Current Discharge Medication List          Procedures:  Procedures        Diagnosis     Clinical Impression:   1. Anasarca    2. Macrocytic anemia        Signed,  Peggy Nielsen D.O. Emergency Physician  [place]    As a voice dictation software was utilized to dictate this note, minor word transpositions can occur.   I apologize for confusing wording and typographic errors. Please feel free to contact me for clarification.

## 2022-01-23 NOTE — ED TRIAGE NOTES
Pt states she is here to get fluids off abd/legs, pt is Dr. Julienne Purdy  Pt, sent here for treatment.

## 2022-01-23 NOTE — H&P
History & Physical    Patient: Simon Miguel MRN: [id number]  CSN: [id number]    YOB: 1945  Age: 68 y.o. Sex: female      DOA: 1/23/2022  Primary Care Provider:  Alfred Peters MD      Assessment/Plan     Hospital Problems  Date Reviewed: 1/23/2022          Codes Class Noted POA    Cirrhosis ([facility] ICD-10-CM: K74.60  ICD-9-CM: 571.5  Unknown Yes        Hypertension ICD-10-CM: I10  ICD-9-CM: 401.9  Unknown Yes    Overview Signed 12/11/2021 10:59 AM by Lisbet Madison MD     well controlled with meds patient states             GERD (gastroesophageal reflux disease) ICD-10-CM: K21.9  ICD-9-CM: 530.81  Unknown Yes        * (Principal) Anasarca ICD-10-CM: R60.1  ICD-9-CM: 775. 3  Unknown Unknown        Ascites ICD-10-CM: R18.8  ICD-9-CM: 789.59  Unknown Yes        Stage 3 chronic kidney disease ([facility].) ICD-10-CM: N18.30  ICD-9-CM: 607. 3  Unknown Yes        Hypothyroidism ICD-10-CM: E03.9  ICD-9-CM: 244.9  Unknown Yes              Admit to hospital     anasarca   due to hypoalbuminemia s/p cirrhosis   albumin infusion     Cirrhosis with ascites   Cirrhosis is secondary to NAFLD   F/u with Dr. Huang Castro  Never had paracentesis done before   Ammonia level 57 no hx of HE        anemia -sob on exertion-symptomatic ,macrocytic   H/h 6.3 /19.8  occult stool negative in Er   disucssed wtih pt about transfusion she refuses now and she will talk with son   do check iron profile , b12 and floate            Thrombocytopenia   secondary to cirrhosis. There is no evidence of overt bleeding. No treatment is required. ckd 3   F/u with [place] nephrologist  Cr is stable at her baseline   Avoid IV contrast renal dose medicine     HTN, accelerated  Continue home medication. Hypothyroidism  Check tsh and continue synthroid       Rash   Nystatin powder      Please note that this dictation was completed with [employer], the [employer] voice recognition software.   Quite often unanticipated grammatical, syntax, homophones, and other interpretive errors are inadvertently transcribed by the computer software. Please disregard these errors. Please excuse any errors that have escaped final proofreading    Estimate  length of stay : 2-3 day    DVT : scd  ppi proph  CC: leg swelling        HPI:     Nikos Martell is a 68 y.o. female with cirrhosis with ascites, CKD 3, anemia is sent  to ER per Dr. Gautam Ratliff due to worsening swelling. She noted her swelling in legs and abdomen become worsening. She visited Dr. Ratna Cano recently and recommend to go to ER for further evaluation and treatment. She reported that she never has paracentesis done. She was found h/h was low, occult stool negative. She denies any black stool, or active bleeding. No chest pain, but sob on exertion. She is NOT vaccinated for covid 19 -she said she is \"allergic to too many things\".      Visit Vitals  /78   Pulse 79   Temp 98 °F (36.7 °C)   Resp 16   Ht 5' 3\" (1.6 m)   Wt 101.2 kg (223 lb)   SpO2 99%   BMI 39.50 kg/m²      O2 Device: None (Room air)      Past Medical History:   Diagnosis Date    Fibromyalgia     GERD (gastroesophageal reflux disease)     Hypertension     well controlled with meds patient states    Liver disease        Past Surgical History:   Procedure Laterality Date    HX BREAST LUMPECTOMY      bilateral    HX CATARACT REMOVAL Right 09/01/2016    HX DILATION AND CURETTAGE      HX GI      colonoscopy, EGD    HX HEENT      sinus surgery    HX KNEE REPLACEMENT Right 08/16/2017    HX MOHS PROCEDURES      bilateral    HX ORTHOPAEDIC      right foot surgery     HX ORTHOPAEDIC      right knee surgery     HX ORTHOPAEDIC      bilateral carpal tunnel release     HX ORTHOPAEDIC Left 2017    foot    HX OTHER SURGICAL      removal of fatty tumor back and abdomen    HX ROTATOR CUFF REPAIR Bilateral     HX TONSILLECTOMY       Fhx: not sure for parents medical hx   Fatty liver -son     Social History     Socioeconomic History    Marital status:    Tobacco Use    Smoking status: Former Smoker     Quit date:      Years since quittin.0    Smokeless tobacco: Never Used   Substance and Sexual Activity    Alcohol use: No    Drug use: No       Prior to Admission medications    Medication Sig Start Date End Date Taking? Authorizing Provider   loratadine (Claritin) 10 mg tablet Take 10 mg by mouth daily. Yes Other, MD Evelyn   escitalopram oxalate (Lexapro) 20 mg tablet Take 20 mg by mouth daily. Provider, Historical   folic acid (FOLVITE) 1 mg tablet Take 1 mg by mouth daily. Provider, Historical   nadolol (CORGARD) 20 mg tablet Take 5 mg by mouth daily. Indications: high blood pressure, Prevent Bleeding Varicose Vein in the Esophagus  Patient not taking: Reported on 2022    Provider, Historical   Omeprazole delayed release (PRILOSEC D/R) 20 mg tablet Take 20 mg by mouth daily. Indications: gastroesophageal reflux disease, Medication Treatment for Healing Erosive Esophagitis    Provider, Historical   albuterol (PROVENTIL HFA, VENTOLIN HFA, PROAIR HFA) 90 mcg/actuation inhaler Take 1 Puff by inhalation every four (4) hours as needed for Wheezing. Indications: chronic bronchitis    Provider, Historical   levothyroxine (SYNTHROID) 50 mcg tablet Take 75 mcg by mouth Daily (before breakfast). Indications: a condition with low thyroid hormone levels    Provider, Historical   diphenhydrAMINE (BENADRYL ALLERGY) 25 mg tablet Take 1 Tab by mouth every six (6) hours as needed for Itching. 17   Red Lin MD   loperamide (IMMODIUM) 2 mg tablet Take 2 mg by mouth three (3) times daily as needed for Diarrhea. Provider, Historical   desoximetasone (TOPICORT) 0.05 % topical cream Apply  to affected area two (2) times daily as needed for Skin Irritation. Provider, Historical   fluticasone (FLONASE) 50 mcg/actuation nasal spray 2 Sprays by Both Nostrils route as needed for Rhinitis.     Provider, Historical gabapentin (NEURONTIN) 600 mg tablet Take 300 mg by mouth daily. Indications: neuropathic pain    Provider, Historical   pravastatin (PRAVACHOL) 10 mg tablet Take 10 mg by mouth nightly. Indications: high cholesterol    Provider, Historical   loratadine-pseudoephedrine (CLARITIN-D 24 HOUR)  mg per tablet Take 1 Tab by mouth as needed. Provider, Historical       Allergies   Allergen Reactions    Latex Hives    Acetaminophen Other (comments)     Can not take- has advanced liver disease.  Adhesive Tape-Silicones Other (comments)     Skin burns    Aspirin Other (comments)     Dr. María Shahid does not want pt to take it     Codeine Nausea and Vomiting and Other (comments)    Dilaudid [Hydromorphone] Nausea and Vomiting     Patient also complained of weakness    Doxycycline Hives and Nausea and Vomiting    Hydrocodone Nausea and Vomiting     Elevated blood pressure, effected her eyes, weakness    Oxycodone Itching and Nausea and Vomiting     Patient complained of vision disturbance    Penicillins Hives and Other (comments)     \" I go blind\"    Sulfa (Sulfonamide Antibiotics) Unknown (comments)       Review of Systems  Gen: No fever, chills, malaise, weight loss/gain. Heent: No headache, rhinorrhea, epistaxis, ear pain, hearing loss, sinus pain, neck pain/stiffness, sore throat. Heart: No chest pain, palpitations, LANDA, pnd, or orthopnea. Resp: No cough, hemoptysis, wheezing and  +shortness of breath on exertion  GI: No nausea, vomiting, diarrhea, constipation, melena or hematochezia. : No urinary obstruction, dysuria or hematuria. Derm: +rash under the breast. No  new skin lesion or pruritis. Musc/skeletal: no bone or joint complains. Vasc: +edema, no cyanosis or claudication. Endo: No heat/cold intolerance, no polyuria,polydipsia or polyphagia. Neuro: No unilateral weakness, numbness, tingling. No seizures. Heme: No easy bruising or bleeding.           Physical Exam:     Physical Exam:  Visit Vitals  /78   Pulse 79   Temp 98 °F (36.7 °C)   Resp 16   Ht 5' 3\" (1.6 m)   Wt 101.2 kg (223 lb)   SpO2 99%   BMI 39.50 kg/m²      O2 Device: None (Room air)    Temp (24hrs), Av °F (36.7 °C), Min:98 °F (36.7 °C), Max:98 °F (36.7 °C)    No intake/output data recorded. No intake/output data recorded. General:  Awake, cooperative, no distress. Head:  Normocephalic, without obvious abnormality, atraumatic. Eyes:  Conjunctivae/corneas clear, sclera anicteric, PERRL, EOMs intact. Nose: Nares normal. No drainage or sinus tenderness. Throat: Lips, mucosa, and tongue normal. .   Neck: Supple, symmetrical, trachea midline, no adenopathy. Lungs:   Clear to auscultation bilaterally. Heart:  Regular rate and rhythm, S1, S2 normal, no murmur, click, rub or gallop. Abdomen: Swelling  distended,  Bowel sounds normal. No masses,  No organomegaly. Extremities: Extremities normal, atraumatic, no cyanosis, +edema. Pulses: 2+ and symmetric all extremities. Skin: Skin color-pink, texture, turgor normal. + rashes under breast. Rt worse than left . Capillary refill normal    Neurologic: CNII-XII intact. No focal motor or sensory deficit.        Labs Reviewed:    BMP:   Lab Results   Component Value Date/Time     2022 01:44 PM    K 4.1 2022 01:44 PM     2022 01:44 PM    CO2 24 2022 01:44 PM    AGAP 7 2022 01:44 PM     (H) 2022 01:44 PM    BUN 19 (H) 2022 01:44 PM    CREA 2.06 (H) 2022 01:44 PM    GFRAA 28 (L) 2022 01:44 PM    GFRNA 23 (L) 2022 01:44 PM     CMP:   Lab Results   Component Value Date/Time     2022 01:44 PM    K 4.1 2022 01:44 PM     2022 01:44 PM    CO2 24 2022 01:44 PM    AGAP 7 2022 01:44 PM     (H) 2022 01:44 PM    BUN 19 (H) 2022 01:44 PM    CREA 2.06 (H) 2022 01:44 PM    GFRAA 28 (L) 2022 01:44 PM    GFRNA 23 (L) 01/23/2022 01:44 PM    CA 7.5 (L) 01/23/2022 01:44 PM    ALB 2.2 (L) 01/23/2022 01:44 PM    TP 5.3 (L) 01/23/2022 01:44 PM    GLOB 3.1 01/23/2022 01:44 PM    AGRAT 0.7 (L) 01/23/2022 01:44 PM    ALT 16 01/23/2022 01:44 PM     CBC:   Lab Results   Component Value Date/Time    WBC 4.7 01/23/2022 01:44 PM    HGB 6.3 (L) 01/23/2022 01:44 PM    HCT 19.8 (L) 01/23/2022 01:44 PM    PLT 64 (L) 01/23/2022 01:44 PM     All Cardiac Markers in the last 24 hours: No results found for: CPK, CK, CKMMB, CKMB, RCK3, CKMBT, CKNDX, CKND1, LIVE, TROPT, TROIQ, MATIAS, TROPT, TNIPOC, BNP, BNPP  Recent Glucose Results:   Lab Results   Component Value Date/Time     (H) 01/23/2022 01:44 PM     ABG: No results found for: PH, PHI, PCO2, PCO2I, PO2, PO2I, HCO3, HCO3I, FIO2, FIO2I  COAGS: No results found for: APTT, PTP, INR, INREXT, INREXT  Liver Panel:   Lab Results   Component Value Date/Time    ALB 2.2 (L) 01/23/2022 01:44 PM    TP 5.3 (L) 01/23/2022 01:44 PM    GLOB 3.1 01/23/2022 01:44 PM    AGRAT 0.7 (L) 01/23/2022 01:44 PM    ALT 16 01/23/2022 01:44 PM    AP 94 01/23/2022 01:44 PM     Pancreatic Markers:   Lab Results   Component Value Date/Time    LPSE 163 01/23/2022 01:44 PM       No results found. Procedures/imaging: see electronic medical records for all procedures/Xrays and details which were not copied into this note but were reviewed prior to creation of Kellen Vega MD, Internal Medicine     CC:  Brandie Alejandra MD

## 2022-01-24 ENCOUNTER — APPOINTMENT (OUTPATIENT)
Dept: CT IMAGING | Age: 77
DRG: 432 | End: 2022-01-24
Attending: HOSPITALIST
Payer: MEDICARE

## 2022-01-24 LAB
ALBUMIN SERPL-MCNC: 2.6 G/DL (ref 3.4–5)
ALBUMIN/GLOB SERPL: 1.1 {RATIO} (ref 0.8–1.7)
ALP SERPL-CCNC: 70 U/L (ref 45–117)
ALT SERPL-CCNC: 12 U/L (ref 13–56)
ANION GAP SERPL CALC-SCNC: 4 MMOL/L (ref 3–18)
AST SERPL-CCNC: 22 U/L (ref 10–38)
BASOPHILS # BLD: 0 K/UL (ref 0–0.1)
BASOPHILS NFR BLD: 0 % (ref 0–2)
BILIRUB SERPL-MCNC: 2.6 MG/DL (ref 0.2–1)
BUN SERPL-MCNC: 20 MG/DL (ref 7–18)
BUN/CREAT SERPL: 10 (ref 12–20)
CALCIUM SERPL-MCNC: 7.5 MG/DL (ref 8.5–10.1)
CHLORIDE SERPL-SCNC: 108 MMOL/L (ref 100–111)
CO2 SERPL-SCNC: 27 MMOL/L (ref 21–32)
CREAT SERPL-MCNC: 2.04 MG/DL (ref 0.6–1.3)
DIFFERENTIAL METHOD BLD: ABNORMAL
EOSINOPHIL # BLD: 0.2 K/UL (ref 0–0.4)
EOSINOPHIL NFR BLD: 8 % (ref 0–5)
ERYTHROCYTE [DISTWIDTH] IN BLOOD BY AUTOMATED COUNT: 17.9 % (ref 11.6–14.5)
GLOBULIN SER CALC-MCNC: 2.4 G/DL (ref 2–4)
GLUCOSE SERPL-MCNC: 89 MG/DL (ref 74–99)
HCT VFR BLD AUTO: 16 % (ref 35–45)
HCT VFR BLD AUTO: 16.9 % (ref 35–45)
HGB BLD-MCNC: 5.1 G/DL (ref 12–16)
HGB BLD-MCNC: 5.2 G/DL (ref 12–16)
IMM GRANULOCYTES # BLD AUTO: 0 K/UL (ref 0–0.04)
IMM GRANULOCYTES NFR BLD AUTO: 0 % (ref 0–0.5)
LYMPHOCYTES # BLD: 0.6 K/UL (ref 0.9–3.6)
LYMPHOCYTES NFR BLD: 24 % (ref 21–52)
MCH RBC QN AUTO: 34.7 PG (ref 24–34)
MCHC RBC AUTO-ENTMCNC: 31.9 G/DL (ref 31–37)
MCV RBC AUTO: 108.8 FL (ref 78–100)
MONOCYTES # BLD: 0.3 K/UL (ref 0.05–1.2)
MONOCYTES NFR BLD: 13 % (ref 3–10)
NEUTS SEG # BLD: 1.4 K/UL (ref 1.8–8)
NEUTS SEG NFR BLD: 55 % (ref 40–73)
NRBC # BLD: 0 K/UL (ref 0–0.01)
NRBC BLD-RTO: 0 PER 100 WBC
PLATELET # BLD AUTO: 48 K/UL (ref 135–420)
PMV BLD AUTO: 10.5 FL (ref 9.2–11.8)
POTASSIUM SERPL-SCNC: 3.8 MMOL/L (ref 3.5–5.5)
PROT SERPL-MCNC: 5 G/DL (ref 6.4–8.2)
RBC # BLD AUTO: 1.47 M/UL (ref 4.2–5.3)
SODIUM SERPL-SCNC: 139 MMOL/L (ref 136–145)
WBC # BLD AUTO: 2.6 K/UL (ref 4.6–13.2)

## 2022-01-24 PROCEDURE — 65270000029 HC RM PRIVATE

## 2022-01-24 PROCEDURE — 74011250637 HC RX REV CODE- 250/637: Performed by: HOSPITALIST

## 2022-01-24 PROCEDURE — P9047 ALBUMIN (HUMAN), 25%, 50ML: HCPCS | Performed by: HOSPITALIST

## 2022-01-24 PROCEDURE — 74011250636 HC RX REV CODE- 250/636: Performed by: HOSPITALIST

## 2022-01-24 PROCEDURE — 74176 CT ABD & PELVIS W/O CONTRAST: CPT

## 2022-01-24 PROCEDURE — 74011000250 HC RX REV CODE- 250: Performed by: HOSPITALIST

## 2022-01-24 PROCEDURE — 97535 SELF CARE MNGMENT TRAINING: CPT

## 2022-01-24 PROCEDURE — 99223 1ST HOSP IP/OBS HIGH 75: CPT | Performed by: INTERNAL MEDICINE

## 2022-01-24 PROCEDURE — 36415 COLL VENOUS BLD VENIPUNCTURE: CPT

## 2022-01-24 PROCEDURE — 97116 GAIT TRAINING THERAPY: CPT

## 2022-01-24 PROCEDURE — 76450000000

## 2022-01-24 PROCEDURE — 85025 COMPLETE CBC W/AUTO DIFF WBC: CPT

## 2022-01-24 PROCEDURE — 80053 COMPREHEN METABOLIC PANEL: CPT

## 2022-01-24 PROCEDURE — 97166 OT EVAL MOD COMPLEX 45 MIN: CPT

## 2022-01-24 PROCEDURE — 97162 PT EVAL MOD COMPLEX 30 MIN: CPT

## 2022-01-24 PROCEDURE — 85018 HEMOGLOBIN: CPT

## 2022-01-24 PROCEDURE — 99222 1ST HOSP IP/OBS MODERATE 55: CPT | Performed by: NURSE PRACTITIONER

## 2022-01-24 RX ORDER — GABAPENTIN 300 MG/1
300 CAPSULE ORAL ONCE
Status: COMPLETED | OUTPATIENT
Start: 2022-01-24 | End: 2022-01-24

## 2022-01-24 RX ORDER — PANTOPRAZOLE SODIUM 40 MG/1
40 TABLET, DELAYED RELEASE ORAL 2 TIMES DAILY
Status: DISCONTINUED | OUTPATIENT
Start: 2022-01-24 | End: 2022-01-28 | Stop reason: HOSPADM

## 2022-01-24 RX ADMIN — PANTOPRAZOLE SODIUM 40 MG: 40 TABLET, DELAYED RELEASE ORAL at 20:11

## 2022-01-24 RX ADMIN — FOLIC ACID 1 MG: 1 TABLET ORAL at 08:26

## 2022-01-24 RX ADMIN — LEVOTHYROXINE SODIUM 75 MCG: 0.07 TABLET ORAL at 07:04

## 2022-01-24 RX ADMIN — ESCITALOPRAM OXALATE 20 MG: 10 TABLET ORAL at 08:26

## 2022-01-24 RX ADMIN — SODIUM CHLORIDE, PRESERVATIVE FREE 10 ML: 5 INJECTION INTRAVENOUS at 16:00

## 2022-01-24 RX ADMIN — GABAPENTIN 300 MG: 300 CAPSULE ORAL at 10:16

## 2022-01-24 RX ADMIN — ALBUMIN (HUMAN) 25 G: 0.25 INJECTION, SOLUTION INTRAVENOUS at 00:30

## 2022-01-24 RX ADMIN — PRAVASTATIN SODIUM 10 MG: 20 TABLET ORAL at 21:46

## 2022-01-24 RX ADMIN — NYSTATIN: 100000 POWDER TOPICAL at 22:00

## 2022-01-24 RX ADMIN — SODIUM CHLORIDE, PRESERVATIVE FREE 10 ML: 5 INJECTION INTRAVENOUS at 20:11

## 2022-01-24 RX ADMIN — ALBUMIN (HUMAN) 25 G: 0.25 INJECTION, SOLUTION INTRAVENOUS at 17:56

## 2022-01-24 RX ADMIN — ALBUMIN (HUMAN) 25 G: 0.25 INJECTION, SOLUTION INTRAVENOUS at 12:37

## 2022-01-24 RX ADMIN — PANTOPRAZOLE SODIUM 40 MG: 40 TABLET, DELAYED RELEASE ORAL at 07:03

## 2022-01-24 RX ADMIN — ALBUMIN (HUMAN) 25 G: 0.25 INJECTION, SOLUTION INTRAVENOUS at 07:04

## 2022-01-24 NOTE — PROGRESS NOTES
Advance Care Planning   Advance Care Planning Inpatient Note  [facility] Department    Today's Date: 1/24/2022  Unit: [facility] SURGICAL/ONCOLOGY    Received request from patient. Upon review of chart and communication with care team, patient's decision making abilities are not in question. Patient and /nurse was/were present in the room during visit. Goals of ACP Conversation:  Discuss Advance Care planning documents    Health Care Decision Makers:      Primary Decision Maker: Jas Carreno Son - [phone]    Secondary Decision Maker: Benjamín Jorgensenneetu - Other Relative - [phone]      Summary:  Completed [address] Decision Maker  Documented Next of Kin, per patient report    Advance Care Planning Documents (Patient Wishes) on file:  Healthcare Power of /Advance Directive appointment of Health care agent  Living Will/ Advance Directive  Anatomical Gift/Organ donation     Assessment:     received a call back from nurse to assist patient in completing an [address] Directive. Patient's nurse verified patient's decision making abilities were not in question. Copies of signed documents were given to patient, agents and scanned into records.     Interventions:  Provided education on documents for clarity and greater understanding  Discussed and provided education on state decision maker hierarchy  Assisted in the completion of documents according to patient's wishes at this time    Care Preferences Communicated:  No    Outcomes/Plan:  New Advance Directive completed  Returned original document(s) to patient, as well as copies for distribution to appointed agents  Copy of Advance Directive given to staff to scan into medical record    Chaplain Meena on 1/24/2022 at 1:16 PM

## 2022-01-24 NOTE — PROGRESS NOTES
Hemoglobin 5.1 reported to LYNNETTE Christiansen, awaiting orders. Was told no blood available and reported to on call nursing supervisor.

## 2022-01-24 NOTE — CONSULTS
Palliative Medicine Consult    Patient Name: Rikki Bach  YOB: 1945    Date of Initial Consult: 1/24/2022  Reason for Consult: Goals of care discussions  Requesting Provider: Dr. Angelica Martinez  Primary Care Physician: Elsa Saldaña MD      SUMMARY:   Rikki Bach is a 68 y.o. with a past history of cirrhosis with ascites, chronic kidney disease stage III, fibromyalgia, and hypertension who was admitted on 1/23/2022 from home (lives with son Vanessa Knapp and daughter-in-law Renetta Ya) with a diagnosis of severe anemia, cirrhosis, ascites, and chronic kidney disease stage 3. Current medical issues leading to Palliative Medicine involvement include: Support and goals of care discussions. PALLIATIVE DIAGNOSES:   1. Goals of care discussions  2. Severe anemia  3. Cirrhosis with ascites  4. Chronic kidney disease stage III  5. Advanced age/debility       PLAN:   1. Goals of care discussions: Palliative medicine team including Denise Roe RN and I met with patient at patient's bedside. Patient is awake, alert, and oriented x4. Patient has just completed an advanced medical directive with the  (appreciate assistance) where she assigned her primary medical power of  as her son Shirin Rayo, and her secondary medical power of  as daughter-in-law Omari Friedman. Patient also noted she would want all life prolonging measures even in the setting of terminal illness and unawareness. She is receiving albumin infusions for anasarca, she has never had paracentesis done before. She is experiencing severe anemia with shortness of breath on exertion, she is undecided on whether she will accept blood products. She notes that she is not a Restorationist, but does believe in some of the Baptism believes behind accepting someone else's blood.   Patient wanted her son Vanessa Knapp and daughter-in-law Ayesha Oscar included in goals of care conversation, called them on phone in patient's room and placed them on speaker phone. Discussed the benefits and burdens of continuing aggressive measures in the setting of advanced liver disease and severe anemia, including potential need for multiple blood transfusions. Also discussed transitioning to comfort measures with the support of hospice at discharge. Family is coming to the hospital today to speak with patient in person, advised them to notify RN should patient become agreeable to blood products, to ensure no delay in care. At this time patient is a full code with full interventions but is unclear if she wants blood transfusion or not. We will follow up tomorrow to readdress goals of care once patient and family have an opportunity to talk today. 2. Severe anemia: Symptomatic, shortness of breath on exertion. She has been refusing blood transfusions, stating she really needs to think about it. No black stool noted, CT abdomen pelvis pending for rule out hematoma. Occult stool negative in ER. Plan for hematology consult if no hematoma noted on CT per primary team.  Last hemoglobin 5.2 g/dL. 3. Cirrhosis with ascites: Followed by Dr. Trever Webber, came in due to increasing fluid buildup and need for albumin transfusions. Her MELD was recently calculated at 25. Reviewed most recent office visit with Dr. Trever Webber, patient is not ready for hospice care. 4. Chronic kidney disease stage III: Monitoring per primary team  5. Advanced age/debility: 77-year-old female who lives with her son and daughter-in-law. She receives care from [facility], needs assistance with all functional ADLs. She has a wheelchair, rolling walker, and a shower chair as needed. 6. Initial consult note routed to primary continuity provider  7.  Communicated plan of care with: Palliative IDT       GOALS OF CARE / TREATMENT PREFERENCES:   [====Goals of Care====]  GOALS OF CARE: Full code with full interventions  Patient/Health Care Proxy Stated Goals: Prolong life      TREATMENT PREFERENCES:   Code Status: Full Code    Advance Care Planning:  Advance Care Planning 1/23/2022   Patient's Healthcare Decision Maker is: Legal Next of Junior 69   Primary Decision Maker Name -   Primary Decision Maker Phone Number -   Primary Decision Maker Relationship to Patient -   Confirm Advance Directive None   Patient Would Like to Complete Advance Directive No       Medical Interventions: Full interventions           The palliative care team has discussed with patient / health care proxy about goals of care / treatment preferences for patient.  [====Goals of Care====]         HISTORY:     History obtained from: patient, family, chart    CHIEF COMPLAINT: fluid in legs and abdomen    HPI/SUBJECTIVE:    The patient is:   [x] Verbal and participatory  [] Non-participatory due to:   Oriented x 4    Clinical Pain Assessment (nonverbal scale for severity on nonverbal patients):   Clinical Pain Assessment  Severity: 0            FUNCTIONAL ASSESSMENT:     Palliative Performance Scale (PPS):   PPS: 40       PSYCHOSOCIAL/SPIRITUAL SCREENING:     Advance Care Planning:  Advance Care Planning 1/23/2022   Patient's Healthcare Decision Maker is: Legal Next of Jnuior 69   Primary Decision Maker Name -   Primary Decision Maker Phone Number -   Primary Decision Maker Relationship to Patient -   Confirm Advance Directive None   Patient Would Like to Complete Advance Directive No        Any spiritual / Pentecostalism concerns:  [] Yes /  [x] No    Caregiver Burnout:  [] Yes /  [] No /  [x] No Caregiver Present      Anticipatory grief assessment:   [] Normal  / [x] Maladaptive            REVIEW OF SYSTEMS:     Positive and pertinent negative findings in ROS are noted above in HPI. The following systems were [x] reviewed / [] unable to be reviewed as noted in HPI  Other findings are noted below.   Systems: constitutional, ears/nose/mouth/throat, respiratory, gastrointestinal, genitourinary, musculoskeletal, integumentary, neurologic, psychiatric, endocrine. Positive findings noted below. Modified ESAS Completed by: provider   Fatigue: 5       Pain: 0   Anxiety: 0 Nausea: 0     Dyspnea: 0     Constipation: No     Stool Occurrence(s): 1        PHYSICAL EXAM:     From RN flowsheet:  Wt Readings from Last 3 Encounters:   01/23/22 101.2 kg (223 lb)   01/19/22 101.2 kg (223 lb)   11/24/21 94.3 kg (208 lb)     Blood pressure (!) 110/52, pulse 76, temperature 98 °F (36.7 °C), resp. rate 16, height 5' 3\" (1.6 m), weight 101.2 kg (223 lb), SpO2 99 %, not currently breastfeeding.     Pain Scale 1: Numeric (0 - 10)  Pain Intensity 1: 0                 Constitutional: Awake, alert, appears chronically ill and debilitated  Eyes: pupils equal, anicteric  ENMT: no nasal discharge, moist mucous membranes  Cardiovascular: regular rhythm, distal pulses intact  Respiratory: breathing not labored, symmetric  Gastrointestinal: firm, distended  Musculoskeletal: no deformity, no tenderness to palpation  Skin: warm, dry  Neurologic: following commands, moving all extremities, oriented x 4  Psychiatric: flat affect, no hallucinations       HISTORY:     Principal Problem:    Anasarca ()    Active Problems:    Cirrhosis (HCC) ()      Hypertension ()      Overview: well controlled with meds patient states      GERD (gastroesophageal reflux disease) ()      Ascites ()      Stage 3 chronic kidney disease (HCC) ()      Hypothyroidism ()      COVID-19 vaccine dose not administered (1/23/2022)      Past Medical History:   Diagnosis Date    Fibromyalgia     GERD (gastroesophageal reflux disease)     Hypertension     well controlled with meds patient states    Liver disease       Past Surgical History:   Procedure Laterality Date    HX BREAST LUMPECTOMY      bilateral    HX CATARACT REMOVAL Right 09/01/2016    HX DILATION AND CURETTAGE      HX GI      colonoscopy, EGD    HX HEENT      sinus surgery    HX KNEE REPLACEMENT Right 08/16/2017    HX MOHS PROCEDURES      bilateral    HX ORTHOPAEDIC      right foot surgery     HX ORTHOPAEDIC      right knee surgery     HX ORTHOPAEDIC      bilateral carpal tunnel release     HX ORTHOPAEDIC Left 2017    foot    HX OTHER SURGICAL      removal of fatty tumor back and abdomen    HX ROTATOR CUFF REPAIR Bilateral     HX TONSILLECTOMY        No family history on file. History reviewed, no pertinent family history. Social History     Tobacco Use    Smoking status: Former Smoker     Quit date:      Years since quittin.0    Smokeless tobacco: Never Used   Substance Use Topics    Alcohol use: No     Allergies   Allergen Reactions    Latex Hives    Acetaminophen Other (comments)     Can not take- has advanced liver disease.     Adhesive Tape-Silicones Other (comments)     Skin burns    Aspirin Other (comments)     Dr. Phan Helton does not want pt to take it     Codeine Nausea and Vomiting and Other (comments)    Dilaudid [Hydromorphone] Nausea and Vomiting     Patient also complained of weakness    Doxycycline Hives and Nausea and Vomiting    Hydrocodone Nausea and Vomiting     Elevated blood pressure, effected her eyes, weakness    Oxycodone Itching and Nausea and Vomiting     Patient complained of vision disturbance    Penicillins Hives and Other (comments)     \" I go blind\"    Sulfa (Sulfonamide Antibiotics) Unknown (comments)      Current Facility-Administered Medications   Medication Dose Route Frequency    pantoprazole (PROTONIX) tablet 40 mg  40 mg Oral BID    sodium chloride (NS) flush 5-40 mL  5-40 mL IntraVENous Q8H    sodium chloride (NS) flush 5-40 mL  5-40 mL IntraVENous PRN    bisacodyL (DULCOLAX) suppository 10 mg  10 mg Rectal DAILY PRN    promethazine (PHENERGAN) tablet 12.5 mg  12.5 mg Oral Q6H PRN    Or    ondansetron (ZOFRAN) injection 4 mg  4 mg IntraVENous Q6H PRN    nystatin (MYCOSTATIN) 100,000 unit/gram powder   Topical BID    gabapentin (NEURONTIN) capsule 300 mg  300 mg Oral DAILY    levothyroxine (SYNTHROID) tablet 75 mcg  75 mcg Oral ACB    pravastatin (PRAVACHOL) tablet 10 mg  10 mg Oral QHS    folic acid (FOLVITE) tablet 1 mg  1 mg Oral DAILY    escitalopram oxalate (LEXAPRO) tablet 20 mg  20 mg Oral DAILY    albuterol (PROVENTIL VENTOLIN) nebulizer solution 2.5 mg  2.5 mg Nebulization Q4H PRN    albumin human 25% (BUMINATE) solution 25 g  25 g IntraVENous Q6H          LAB AND IMAGING FINDINGS:     Lab Results   Component Value Date/Time    WBC 2.6 (L) 01/24/2022 04:45 AM    HGB 5.2 (LL) 01/24/2022 07:50 AM    PLATELET 48 (L) 35/26/3783 04:45 AM     Lab Results   Component Value Date/Time    Sodium 139 01/24/2022 04:45 AM    Potassium 3.8 01/24/2022 04:45 AM    Chloride 108 01/24/2022 04:45 AM    CO2 27 01/24/2022 04:45 AM    BUN 20 (H) 01/24/2022 04:45 AM    Creatinine 2.04 (H) 01/24/2022 04:45 AM    Calcium 7.5 (L) 01/24/2022 04:45 AM      Lab Results   Component Value Date/Time    Alk. phosphatase 70 01/24/2022 04:45 AM    Protein, total 5.0 (L) 01/24/2022 04:45 AM    Albumin 2.6 (L) 01/24/2022 04:45 AM    Globulin 2.4 01/24/2022 04:45 AM     Lab Results   Component Value Date/Time    INR 1.7 (H) 11/24/2021 01:55 PM    Prothrombin time 19.1 (H) 11/24/2021 01:55 PM    aPTT 33.6 08/02/2017 04:30 PM      Lab Results   Component Value Date/Time    Iron 104 01/23/2022 01:44 PM    TIBC 165 (L) 01/23/2022 01:44 PM    Iron % saturation 63 (H) 01/23/2022 01:44 PM      No results found for: PH, PCO2, PO2  No components found for: GLPOC   No results found for: CPK, CKMB             Total time: 50 minutes  Counseling / coordination time, spent as noted above:   > 50% counseling / coordination?: yes, patient, family, and medical team    Prolonged service was provided for  []30 min   []75 min in face to face time in the presence of the patient, spent as noted above. Time Start:   Time End:   Note: this can only be billed with 51581 (initial) or 24627 (follow up).   If multiple start / stop times, list each separately.

## 2022-01-24 NOTE — PROGRESS NOTES
Problem: Mobility Impaired (Adult and Pediatric)  Goal: *Acute Goals and Plan of Care (Insert Text)  Description: Physical Therapy Goals   Initiated 1/24/2022 and to be accomplished within 5 day(s)  1. Patient will move from supine <> sit with mod I in prep for out of bed activity and change of position. 2.  Patient will perform sit<> stand with mod I with RW in prep for transfers/ambulation. 3.  Patient will transfer from bed <> chair with mod I with RW for time up in chair for completion of ADL activity. 4.  Patient will ambulate 100 feet with mod I/RW for improved functional mobility at discharge. 5.  Patient will ascend/descend 2 stairs with handrail(s) with minimal assistance/contact guard assist for home re-entry as needed. Outcome: Progressing Towards Goal   PHYSICAL THERAPY EVALUATION    Patient: Ofelia Mcnamara (82 y.o. female)  Date: 1/24/2022  Primary Diagnosis: Anasarca [R60.1]  Precautions:   Fall  PLOF: amb with RW in home; uses w/c for distance mobility    ASSESSMENT :  Based on the objective data described below, the patient is seen on medical unit following admission for above dz. Pt presents with edema BLE's, and decreased LE ROM/strength, impacting independence in functional mobility. Demonstrates transition to sit EOB with supervision/additional time, transfers to stand with RW/CGA and able to complete GT/RW/CGA ~56ft in greene. Lindsey decr'd with decr'd foot clearance and incr'd MADHU. Pt left seated EOB with all needs in reach, and nurse Omega Sin notified. Also assisted pt with choosing dinner meal and same ordered. Pt educated in PT POC and role in IP setting. Recommend HHPT for follow up physical therapy upon discharge to reach maximal level of independence/safety with functional mobility. Patient will benefit from skilled intervention to address the above impairments.     Pt Education: Role of physical therapy in acute care setting, fall prevention and safety/technique during functional mobility tasks    Patient's rehabilitation potential is considered to be Fair  Factors which may influence rehabilitation potential include:   []         None noted  []         Mental ability/status  [x]         Medical condition  []         Home/family situation and support systems  []         Safety awareness  []         Pain tolerance/management  []         Other:      PLAN :  Recommendations and Planned Interventions:   [x]           Bed Mobility Training             []    Neuromuscular Re-Education  [x]           Transfer Training                   []    Orthotic/Prosthetic Training  [x]           Gait Training                          []    Modalities  [x]           Therapeutic Exercises           []    Edema Management/Control  [x]           Therapeutic Activities            []    Family Training/Education  [x]           Patient Education  []           Other (comment):    Frequency/Duration: Patient will be followed by physical therapy 1-2 times per day/4-7 days per week to address goals. Discharge Recommendations: Home Physical Therapy  Further Equipment Recommendations for Discharge: N/A     SUBJECTIVE:   Patient stated It's just my bronchitis.     OBJECTIVE DATA SUMMARY:     Past Medical History:   Diagnosis Date    Fibromyalgia     GERD (gastroesophageal reflux disease)     Hypertension     well controlled with meds patient states    Liver disease      Past Surgical History:   Procedure Laterality Date    HX BREAST LUMPECTOMY      bilateral    HX CATARACT REMOVAL Right 09/01/2016    HX DILATION AND CURETTAGE      HX GI      colonoscopy, EGD    HX HEENT      sinus surgery    HX KNEE REPLACEMENT Right 08/16/2017    HX MOHS PROCEDURES      bilateral    HX ORTHOPAEDIC      right foot surgery     HX ORTHOPAEDIC      right knee surgery     HX ORTHOPAEDIC      bilateral carpal tunnel release     HX ORTHOPAEDIC Left 2017    foot    HX OTHER SURGICAL      removal of fatty tumor back and abdomen HX ROTATOR CUFF REPAIR Bilateral     HX TONSILLECTOMY       Barriers to Learning/Limitations: yes;  physical  Compensate with: Visual Cues, Verbal Cues, and Tactile Cues  Home Situation:  Home Situation  Home Environment: Private residence  # Steps to Enter: 2  Rails to Enter: No  One/Two Story Residence: One story  Living Alone: No  Support Systems: Child(daniel) (lives with son and his family)  Patient Expects to be Discharged to[de-identified] Home  Current DME Used/Available at Home: [employer], rolling,Shower chair,Grab bars  Tub or Shower Type: Shower  Critical Behavior:  Neurologic State: Alert; Appropriate for age  Orientation Level: Oriented X4  Cognition: Follows commands  Safety/Judgement: Awareness of environment; Fall prevention  Psychosocial  Patient Behaviors: Calm; Cooperative  Purposeful Interaction: Yes  Pt Identified Daily Priority: Clinical issues (comment)  Caritas Process: Nurture loving kindness; Teaching/learning; Attend basic human needs  Caring Interventions: Reassure  Reassure: Therapeutic listening  Strength:    Strength: Generally decreased, functional  Tone & Sensation:   Tone: Normal  Sensation: Intact  Range Of Motion:  AROM: Generally decreased, functional (BLE edema)  Functional Mobility:  Bed Mobility:  Supine to Sit: Supervision; Additional time  Sit to Supine: Minimum assistance  Scooting: Supervision  Transfers:  Sit to Stand: Contact guard assistance  Stand to Sit: Contact guard assistance  Balance:   Sitting: Intact  Standing: Intact; With support  Ambulation/Gait Training:  Distance (ft): 56 Feet (ft)  Assistive Device: Gait belt;Walker, rolling  Ambulation - Level of Assistance: Supervision;Stand-by assistance  Gait Description (WDL): Exceptions to WDL  Gait Abnormalities: Decreased step clearance  Base of Support: Widened  Speed/Lindsey: Pace decreased (<100 feet/min)  Step Length: Right shortened;Left shortened  Interventions: Safety awareness training;Verbal cues  Pain:  Pain level pre-treatment: 0/10   Pain level post-treatment: 0/10   Pain Intervention(s) : Medication (see MAR); Rest, Ice, Repositioning  Response to intervention: Nurse notified, See doc flow  Activity Tolerance:   Fair   Please refer to the flowsheet for vital signs taken during this treatment. After treatment:   [x]         Patient left in no apparent distress sitting up EOB  []         Patient left in no apparent distress in bed  [x]         Call bell left within reach  [x]         Nursing notified  []         Caregiver present  []         Bed alarm activated  []         SCDs applied    COMMUNICATION/EDUCATION:   [x]         Role of Physical Therapy in the acute care setting. [x]         Fall prevention education was provided and the patient/caregiver indicated understanding. [x]         Patient/family have participated as able in goal setting and plan of care. [x]         Patient/family agree to work toward stated goals and plan of care. []         Patient understands intent and goals of therapy, but is neutral about his/her participation. []         Patient is unable to participate in goal setting/plan of care: ongoing with therapy staff.  []         Other:     Thank you for this referral.  Arcelia Hill, PT   Time Calculation: 23 mins      Eval Complexity: History: HIGH Complexity :3+ comorbidities / personal factors will impact the outcome/ POC Exam:MEDIUM Complexity : 3 Standardized tests and measures addressing body structure, function, activity limitation and / or participation in recreation  Presentation: MEDIUM Complexity : Evolving with changing characteristics  Clinical Decision Making:Medium Complexity    Overall Complexity:MEDIUM

## 2022-01-24 NOTE — ROUTINE PROCESS
Attempted to contact patient's son, Heriberto Trinh, at [phone] with no success. Unable to leave voice message as mailbox was full.

## 2022-01-24 NOTE — PROGRESS NOTES
Patient admitted this shift. No s/sx of acute distress. Able to call for assist as needed. Ambulates with walker and 1 assist to bathroom. Generalized edema noted, especially BLE. Noted wheezing upon expiration, No SOB reported. Will continue to monitor.

## 2022-01-24 NOTE — ACP (ADVANCE CARE PLANNING)
Advance Care Planning     General Advance Care Planning (ACP) Conversation    Date of Conversation: 1/24/2022  Conducted with: Patient with Decision Making Capacity    Healthcare Decision Maker:     Primary Decision Maker: Susy - Son - [phone]    Secondary Decision Maker: Abhijeet Crane - Other Relative - [phone]    Today we documented Decision Maker(s) consistent with ACP documents on file. Content/Action Overview: Has ACP document(s) on file - reflects the patient's care preferences  Reviewed DNR/DNI and patient elects Full Code (Attempt Resuscitation)    Length of Voluntary ACP Conversation in minutes:  <16 minutes (Non-Billable)     1/24/2022 1130 Seen today in room 313 along with Olinda Freitas NP. Lying in bed. Awake, alert. Oriented x 4. Respirations unlabored on room air. Engaged fully in conversation. Came to the ED on 1/23/2022 via POV fluid management after being contacted by Dr Mike Lind. Admitted for anasarca with anticipation of paracentesis and IV albumin. PMH significant for NAFLD,     The palliative care team has been consulted for goals of care discussion    Introduced the role of palliative medicine for the hospitalized patient. Lives in a single family home with her Rosalind Meigs, and daughter-in-law, Getachew Cleary. Prior to admission,she was independent in ADLs. Uses wheelchari for ambulation assistance. Prior to arrival, she had met with Spiritual care staff and complete and AMD naming her son and daughter-in-law as her MPOAs. She had said, when completing her AMD, that she \"wanted everything done\" in the event of any medical crisis. Asked about her current declination of blood products and she voice some concerns based on biblical teaching and concerns of DNA issues. She is not a member of the [employer]. Discussed her very low Hgb (5.1) and concerns for worsening health if that is not corrected.  On her request, we were able to get Trice Vega and Getachew Cleary on the phone to discuss the concerns surrounding blood transfusion. They had many questions about the frequency and chronicity of blood administrations in this setting. Mrs Lizbeth Martin may need frequent transfusions. No decisions were made but Marbella Chawla and Merlyn Teran were planning on coming to the hospital today for further conversations. Encouraged them to let the nursing/medicl staff know if they had decided to proceed with transfusion. Disposition plan: anticipate she will return home with family support. Palliative care will continue to follow Reno Gauzlory \"Alma\" Luanne  and her family during her hospitalization and support them as they make healthcare decisions and define goals of care.       Felicia Altamirano RN, MSN  Palliative Medicine  P: [phone]

## 2022-01-24 NOTE — PROGRESS NOTES
.Bedside, Verbal and Written shift change report given to Lucio Marie RN (oncoming nurse) by Mia Lerner RN (offgoing nurse). Report included the following information SBAR, Kardex and MAR.

## 2022-01-24 NOTE — PROGRESS NOTES
Hospitalist Progress Note-critical care note     Patient: Willow Sanford MRN: [id number]  CSN: [id number]    YOB: 1945  Age: 68 y.o. Sex: female    DOA: 1/23/2022 LOS:  LOS: 1 day            Chief complaint: severe anemia cirrhosis, gerd ,ascites, ckd3     Assessment/Plan         Hospital Problems  Date Reviewed: 1/23/2022          Codes Class Noted POA    COVID-19 vaccine dose not administered ICD-10-CM: Z28.9  ICD-9-CM: V64.00  1/23/2022 Unknown        Cirrhosis ([facility] ICD-10-CM: K74.60  ICD-9-CM: 571.5  Unknown Yes        Hypertension ICD-10-CM: I10  ICD-9-CM: 401.9  Unknown Yes    Overview Signed 12/11/2021 10:59 AM by Holly Payan MD     well controlled with meds patient states             GERD (gastroesophageal reflux disease) ICD-10-CM: K21.9  ICD-9-CM: 530.81  Unknown Yes        * (Principal) Anasarca ICD-10-CM: R60.1  ICD-9-CM: 877. 3  Unknown Unknown        Ascites ICD-10-CM: R18.8  ICD-9-CM: 789.59  Unknown Yes        Stage 3 chronic kidney disease ([facility] ICD-10-CM: N18.30  ICD-9-CM: 525. 3  Unknown Yes        Hypothyroidism ICD-10-CM: E03.9  ICD-9-CM: 244.9  Unknown Yes               anasarca   Mild improving   due to hypoalbuminemia s/p cirrhosis   albumin infusion      Cirrhosis with ascites   Cirrhosis is secondary to NAFLD   F/u with Dr. Geoff Lopez consult him p  Never had paracentesis done before   Ammonia level 57 no hx of HE         anemia -sob on exertion-symptomatic ,macrocytic -refuse blood transfusion   Continue trending down, no black stool noted   Will have ct abdomen/pelvic to r/o hematoma   occult stool negative in Er    b12 and floate elevated ,iron 104   Will have hematologist on board if no hematoma from ct             Thrombocytopenia   secondary to cirrhosis.   There is no evidence of overt bleeding.    No treatment is required.     ckd 3   F/u with [place] nephrologist  Cr is stable at her baseline   Avoid IV contrast renal dose medicine      HTN, accelerated  Continue home medication. Hypothyroidism  continue synthroid        Rash   Nystatin powder    Subjective: I feel fine   Low h/h discussed with pt and son Lidia Fragoso, also discussed with them about blood transfusion. Pt would like to talk with son, son deferred to patient to make decision. Son said h/h low in Dr. Stephen Dougherty and dr. Mariel Swanson knew it, he hopes mother to make decision for transfusion-pt would like to think about it . Discussed with pt about anemia-can be life-threatening if not corrected, she indicated understanding. Code status discussed with her, she wants to be full code. Palliative care consulted     Called pt room again in the afternoon, discussed with transfusion again, she still not making decision and she still wants to talk with son. Recommend pt to call provider once she makes decision     All questions have been answered. 55 total min's spent on patient care including >50% on counseling/coordinating care. Discussed the above assessments. also discussed labs, medications and hospital course    rn h/h low , no bleeding     Disposition :tbd,   Review of systems:    General: No fevers or chills. Cardiovascular: No chest pain or pressure. No palpitations. Pulmonary: No shortness of breath. Gastrointestinal: No nausea, vomiting. Vital signs/Intake and Output:  Visit Vitals  BP (!) 110/52   Pulse 76   Temp 98 °F (36.7 °C)   Resp 16   Ht 5' 3\" (1.6 m)   Wt 101.2 kg (223 lb)   SpO2 99%   Breastfeeding No   BMI 39.50 kg/m²     Current Shift:  No intake/output data recorded. Last three shifts:  No intake/output data recorded. Physical Exam:  General: WD, WN. Alert, cooperative, no acute distress    HEENT: NC, Atraumatic. PERRLA, anicteric sclerae. Lungs: CTA Bilaterally. No Wheezing/Rhonchi/Rales. Heart:  Regular  rhythm,  No murmur, No Rubs, No Gallops  Abdomen: + distended, Non tender. +Bowel sounds,   Extremities: No c/c.  Edema mild improving   Psych:   Not anxious or agitated. Neurologic:  No acute neurological deficit. Labs: Results:       Chemistry Recent Labs     01/24/22  0445 01/23/22  1344   GLU 89 101*    138   K 3.8 4.1    107   CO2 27 24   BUN 20* 19*   CREA 2.04* 2.06*   CA 7.5* 7.5*   AGAP 4 7   BUCR 10* 9*   AP 70 94   TP 5.0* 5.3*   ALB 2.6* 2.2*   GLOB 2.4 3.1   AGRAT 1.1 0.7*      CBC w/Diff Recent Labs     01/24/22  0750 01/24/22  0445 01/23/22  1344   WBC  --  2.6* 4.7   RBC  --  1.47* 1.77*   HGB 5.2* 5.1* 6.3*   HCT 16.9* 16.0* 19.8*   PLT  --  48* 64*   GRANS  --  55 61   LYMPH  --  24 21   EOS  --  8* 5      Cardiac Enzymes No results for input(s): CPK, CKND1, LIVE in the last 72 hours. No lab exists for component: CKRMB, TROIP   Coagulation No results for input(s): PTP, INR, APTT, INREXT in the last 72 hours. Lipid Panel No results found for: CHOL, CHOLPOCT, CHOLX, CHLST, CHOLV, 858768, HDL, HDLP, LDL, LDLC, DLDLP, 188725, VLDLC, VLDL, TGLX, TRIGL, TRIGP, TGLPOCT, CHHD, CHHDX   BNP No results for input(s): BNPP in the last 72 hours. Liver Enzymes Recent Labs     01/24/22  0445   TP 5.0*   ALB 2.6*   AP 70      Thyroid Studies Lab Results   Component Value Date/Time    TSH 9.94 (H) 01/23/2022 01:44 PM        Procedures/imaging: see electronic medical records for all procedures/Xrays and details which were not copied into this note but were reviewed prior to creation of Plan    No results found.     Anitha Bolden MD

## 2022-01-24 NOTE — PROGRESS NOTES
Problem: Falls - Risk of  Goal: *Absence of Falls  Description: Document Valeria Ground Fall Risk and appropriate interventions in the flowsheet.   Outcome: Progressing Towards Goal  Note: Fall Risk Interventions:  Mobility Interventions: Bed/chair exit alarm,OT consult for ADLs,PT Consult for mobility concerns         Medication Interventions: Teach patient to arise slowly,Bed/chair exit alarm,Evaluate medications/consider consulting pharmacy    Elimination Interventions: Bed/chair exit alarm,Call light in reach,Toilet paper/wipes in reach,Toileting schedule/hourly rounds              Problem: Patient Education: Go to Patient Education Activity  Goal: Patient/Family Education  Outcome: Progressing Towards Goal     Problem: General Medical Care Plan  Goal: *Vital signs within specified parameters  Outcome: Progressing Towards Goal  Goal: *Labs within defined limits  Outcome: Progressing Towards Goal  Goal: *Absence of infection signs and symptoms  Outcome: Progressing Towards Goal  Goal: *Optimal pain control at patient's stated goal  Outcome: Progressing Towards Goal  Goal: *Skin integrity maintained  Outcome: Progressing Towards Goal  Goal: *Fluid volume balance  Outcome: Progressing Towards Goal  Goal: *Optimize nutritional status  Outcome: Progressing Towards Goal  Goal: *Anxiety reduced or absent  Outcome: Progressing Towards Goal  Goal: *Progressive mobility and function (eg: ADL's)  Outcome: Progressing Towards Goal     Problem: Patient Education: Go to Patient Education Activity  Goal: Patient/Family Education  Outcome: Progressing Towards Goal

## 2022-01-24 NOTE — PROGRESS NOTES
1/24/2022 PT note: consult received and chart reviewed. Evaluation attempted. Palliative Care team currently speaking with pt. Will f/u at later time as pt schedule allows for PT evaluation.  Thank you for this referral.   Argentina Guadalupe, PT

## 2022-01-24 NOTE — CONSULTS
[address], [place], [address]      JULISA Brown, JOVANNI-BC     Graciela Shrestha, [facility]   LAVINIA Ruedacatherine Jean, [facility]       Sowmya Nori [facility] 136    at [address]    [address] [place] [address], [address] [place] [address], [place]  22.    [phone]    FAX: [address] [place] [address], [address]    [phone]    FAX: [phone]       HEPATOLOGY CONSULT NOTE  The patient is well known to and regularly cared for at [facility]. She is a 68 y.o.  female who was found to have chronic liver disease in 2002 and cirrhosis in 2011. Serologic evaluation for markers of chronic liver disease was negative.     The patient has developed the following complications of cirrhosis: Gastric varices, ascites, edema,     She has recently developed worsening anasarca and SYLVIE. She has gained about 30 pounds of edema in her legs. We discussed options at the last office appointment last week and decided the only way to effectively remove edema and treat SYLVIE was hospitalization and treatment with IV albumin. Hepatology Plan:  IV albumin 25 gm Q6H  Can add diuretics once Scr is below 1.5 mg        ASSESSMENT AND PLAN:  Cirrhosis  The diagnosis of cirrhosis is based upon imaging, laboratory studies, complications of cirrhosis. Cirrhosis is thought to be secondary to NAFLD.     The patient has normal liver function. The patient has never developed any complications of cirrhosis to date.      The CTP is 10. Child class C. The MELD score is 22.     Have performed laboratory testing to monitor liver function and degree of liver injury. This included BMP, hepatic panel, CBC with platelet count, INR.     Liver transaminases are normal.  ALP is normal.  Liver function is depressed. The platelet count is depressed.        Serologic testing for causes of chronic liver disease was negative      Elevation in Ferritin  There is an elevation in ferritin with normal iron saturation. 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 The elevation in ferritin is the result of the HFE gene mutation and does not reflect iron overload. Phlebotomy therapy is not indicated.     Ascites   Ascites developed for the first time in 7/2021. Ascites is refractory to current doses of diuretics because of kidney disease      She is not taking diuretics.     Paracentesis will be performed as needed.     The patient was counseled regarding the need to maintain sodium restriction and the types of foods containing high amounts of sodium to be avoided.     Lower extremity edema  Edema is refractory to the current doses of diuretics because of kidney disease   The patient is not taking diuretics.     Screening for Esophageal varices   The patient has gastric varices without prior bleeding. The patient is on a beta-blocker to reduce the risk of variceal hemorrhage. Gastric varices are at low risk for bleeding.     Beta-blockers can increase the risk of CKD. Will stop the nadolol as this may help CKD.     Hepatic encephalopathy   Overt HE has not developed to date. There is no reason for treatment with lactulose of xifaxan  There is no need to restrict dietary protein at this time.       Thrombocytopenia   This is secondary to cirrhosis. There is no evidence of overt bleeding. No treatment is required. The platelet count is adequate for the patient to undergo procedures without the need for platelet transfusion or platelet growth factors.     Anemia   This is due to multifactorial causes including portal hypertension with chronic GI blood loss   The most recent FE studies were from 11/2021.   The serum ferritin is 941  The FE saturation is 50     Screening for Hepatocellular Carcinoma  HCC screening has recently been performed and does not suggest [address] The next liver imaging study will be performed in 2/2022. SYSTEM REVIEW:  Constitution systems: Negative for fever, chills, weight gain, weight loss. Eyes: Negative for visual changes. ENT: Negative for sore throat, painful swallowing. Respiratory: Negative for cough, hemoptysis, SOB. Cardiology: Negative for chest pain, palpitations. Leg swelling is severe  GI:  Negative for constipation or diarrhea. : Negative for urinary frequency, dysuria, hematuria, nocturia. Skin: Negative for rash. Hematology: Negative for easy bruising, blood clots. Musculo-skelatal: Negative for back pain, muscle pain, weakness. Neurologic: Negative for headaches, dizziness, vertigo, memory problems not related to HE. Psychology: Negative for anxiety, depression. FAMILY HISTORY:  The patient has no knowledge of the father's medical condition. The patient has no knowledge of the mother's medical condition. There is a son with fatty liver.     SOCIAL HISTORY:  The patient is . The patient has 4 children, and 4 grandchildren. The patient stopped using tobacco products in 1970s. The patient has never consumed significant amounts of alcohol. The patient used to work as a seamstress. PHYSICAL EXAMINATION:  VS: per nursing note  General:  No acute distress. Eyes:  Sclera anicteric. ENT:  No oral lesions. Thyroid normal.  Nodes:  No adenopathy. Skin:  No spider angiomata. No jaundice. Respiratory:  Lungs clear to auscultation. Cardiovascular:  Regular heart rate. Abdomen:  Soft non-tender, No obvious ascites. Extremities:  4+ lower extremity edema. Neurologic:  Alert and oriented. Cranial nerves grossly intact. No asterixis. LABORATORY:  Results for Delcia Redo" (MRN [id number]) as of 1/24/2022 07:20   Ref.  Range 1/23/2022 13:44 1/23/2022 14:23 1/24/2022 04:45   WBC Latest Ref Range: 4.6 - 13.2 K/uL 4.7  2.6 (L)   HGB Latest Ref Range: 12.0 - 16.0 g/dL 6.3 (L)  5.1 (LL)   PLATELET Latest Ref Range: 135 - 420 K/uL 64 (L)  48 (L)   Sodium Latest Ref Range: 136 - 145 mmol/L 138  139   Potassium Latest Ref Range: 3.5 - 5.5 mmol/L 4.1  3.8   Chloride Latest Ref Range: 100 - 111 mmol/L 107  108   CO2 Latest Ref Range: 21 - 32 mmol/L 24  27   Glucose Latest Ref Range: 74 - 99 mg/dL 101 (H)  89   BUN Latest Ref Range: 7.0 - 18 MG/DL 19 (H)  20 (H)   Creatinine Latest Ref Range: 0.6 - 1.3 MG/DL 2.06 (H)  2.04 (H)   Bilirubin, total Latest Ref Range: 0.2 - 1.0 MG/DL 3.0 (H)  2.6 (H)   Albumin Latest Ref Range: 3.4 - 5.0 g/dL 2.2 (L)  2.6 (L)   ALT Latest Ref Range: 13 - 56 U/L 16  12 (L)   AST Latest Ref Range: 10 - 38 U/L 44 (H)  22   Alk. phosphatase Latest Ref Range: 45 - 117 U/L 94  70   Ammonia Latest Ref Range: 11 - 32 UMOL/L  57 (H)        RADIOLOGY:  CT scan abdomen without IV contrast.  Changes consistent with cirrhosis. No liver mass lesions. Moderate ascites. Spontaneous spleno-renal shunt. Anasarca.         Lazaro Libman, MD  [facility] [address] of [address], [address] [facility], [facility] 22.  [phone]  [address] [facility]

## 2022-01-24 NOTE — PROGRESS NOTES
OCCUPATIONAL THERAPY EVALUATION    Problem: Self Care Deficits Care Plan (Adult)  Goal: *Acute Goals and Plan of Care (Insert Text)  1/24/2022 1003 by Ezra Byrne  Note:   FUNCTIONAL STATUS PRIOR TO ADMISSION: Patient required minimal assistance for basic and instrumental ADLs. HOME SUPPORT: The patient lived alone with son to provide assistance. Initial Occupational Therapy Goals (1/24/2022) Within 7 day(s):  1. Patient will perform perform grooming standing sink level for 5 minutes for increased independence in ADLs. 2. Patient will perform UB dressing with set up seated EOB for increased independence with ADLs. 3. Patient will perform LB dressing with min A & A/E PRN for increased independence with ADLs. 4. Patient will perform all aspects of toileting with CGA for increased independence with ADLs. 5. Patient will perform LE ADLs utilizing body mechanics & adaptive strategies with 1 verbal cue for increased safety in ADLs. 6. Patient will independently apply energy conservation techniques with no verbal cue(s) for increased independence with ADLs. 1/24/2022 0957 by Ezra Byrne  Outcome: Progressing Towards Goal     Patient: Justina Nuñez (52 y.o. female)  Date: 1/24/2022  Primary Diagnosis: Anasarca [R60.1]        Precautions:   Fall  PLOF: Pt reports living with son, grossly min A for bathing, lower body dressing tasks and sit to stand. Pt using FWW for ambulation with using w/c for longer distances. Pt reports having shower chair and grab bars charanjit\liable at home. ASSESSMENT :  Based on the objective data described below, the patient presents with decreased ADL independence, generalized weakness following anasarca diagnosis. Pt presents in bathroom using commode. Requires min a sit to stand with VC for feet placement inside walker and education on grab bars. Pt able to perform bathroom mobility with CGA, stand and wash hands at sink, no LOB noted when standing. Ambulated back to sitting. Pt unable to bend forward to reach past mid ankle. Pt unable to don/doff socks or underpants, requires mod A however usually gets assistance at home. Would benefit from dressing a/e sock aid and dressing stick. Pt able to perform sit to stand with min A from lower bed position and perform dynamic reaching across body with min support for balance. Pt returns to supine with min A for LE assitance. Pt left with all needs in reach. Education: Pt educated on role of OT in acute setting, FWW safety in bathroom. Patient will benefit from skilled intervention to address the above impairments. Patient's rehabilitation potential is considered to be Good  Factors which may influence rehabilitation potential include:   []             None noted  []             Mental ability/status  [x]             Medical condition  []             Home/family situation and support systems  []             Safety awareness  []             Pain tolerance/management  []             Other:      PLAN :  Recommendations and Planned Interventions:   [x]               Self Care Training                  [x]      Therapeutic Activities  [x]               Functional Mobility Training   []      Cognitive Retraining  [x]               Therapeutic Exercises           [x]      Endurance Activities  [x]               Balance Training                    [x]      Neuromuscular Re-Education  []               Visual/Perceptual Training     [x]      Home Safety Training  [x]               Patient Education                   [x]      Family Training/Education  []               Other (comment):    Frequency/Duration: Patient will be followed by occupational therapy daily to address goals. Discharge Recommendations: Home Health  Further Equipment Recommendations for Discharge: TBD     SUBJECTIVE:   Patient stated I usually get some help at home.     OBJECTIVE DATA SUMMARY:     Past Medical History:   Diagnosis Date    Fibromyalgia     GERD (gastroesophageal reflux disease)     Hypertension     well controlled with meds patient states    Liver disease      Past Surgical History:   Procedure Laterality Date    HX BREAST LUMPECTOMY      bilateral    HX CATARACT REMOVAL Right 09/01/2016    HX DILATION AND CURETTAGE      HX GI      colonoscopy, EGD    HX HEENT      sinus surgery    HX KNEE REPLACEMENT Right 08/16/2017    HX MOHS PROCEDURES      bilateral    HX ORTHOPAEDIC      right foot surgery     HX ORTHOPAEDIC      right knee surgery     HX ORTHOPAEDIC      bilateral carpal tunnel release     HX ORTHOPAEDIC Left 2017    foot    HX OTHER SURGICAL      removal of fatty tumor back and abdomen    HX ROTATOR CUFF REPAIR Bilateral     HX TONSILLECTOMY       Barriers to Learning/Limitations: None  Compensate with: visual, verbal, tactile, kinesthetic cues/model    Home Situation:   Home Situation  Home Environment: Private residence  # Steps to Enter: 1  One/Two Story Residence: One story  Living Alone: No  Support Systems: Child(daniel)  Patient Expects to be Discharged to[de-identified] Home  Current DME Used/Available at Home: Grab bars,Shower chair,Walker, rolling,Wheelchair  Tub or Shower Type: Shower  [x]  Right hand dominant   []  Left hand dominant    Cognitive/Behavioral Status:  Neurologic State: Alert; Appropriate for age  Orientation Level: Oriented X4  Cognition: Appropriate decision making; Follows commands  Safety/Judgement: Fall prevention    Skin: intact  Edema: B LE edema noted. Vision/Perceptual:    Tracking: Able to track stimulus in all quadrants w/o difficulty      Coordination: BUE  Coordination: Within functional limits  Fine Motor Skills-Upper: Left Intact; Right Intact    Gross Motor Skills-Upper: Left Intact; Right Intact    Balance:  Sitting: Intact  Standing: Intact; With support    Strength: BUE  Strength: Generally decreased, functional       Tone & Sensation: BUE  Tone: Normal  Sensation: Intact    Range of Motion: BUE  AROM: Within functional limits         Functional Mobility and Transfers for ADLs:  Bed Mobility:     Supine to Sit: Contact guard assistance  Sit to Supine: Minimum assistance     Transfers:  Sit to Stand: Minimum assistance        Toilet Transfer : Minimum assistance     Bathroom Mobility: Contact guard assistance    ADL Assessment:   Feeding: Setup    Oral Facial Hygiene/Grooming: Contact guard assistance    Bathing: Minimum assistance    Upper Body Dressing: Contact guard assistance    Lower Body Dressing: Moderate assistance    Toileting: Contact guard assistance         ADL Intervention:     Lower Body Dressing Assistance  Dressing Assistance: Moderate assistance  Underpants: Moderate assistance  Socks: Moderate assistance  Leg Crossed Method Used: No  Position Performed: Seated edge of bed    Toileting  Toileting Assistance: Contact guard assistance  Bowel Hygiene: Contact guard assistance  Clothing Management: Contact guard assistance    Cognitive Retraining  Problem Solving: Identifying the problem;General alternative solution  Executive Functions: Managing time;Regulating behavior  Safety/Judgement: Fall prevention      Pain:  Pain level pre-treatment: 0/10   Pain level post-treatment: 0/10   Pain Intervention(s): Medication provided by Nursing (see MAR); Rest, Ice, Repositioning   Response to intervention: Nurse notified, See doc flow sheet    Activity Tolerance:   Fair. Patient able to stand 8 minute(s). Patient able to complete ADLs withrest breaks. Patient limited by weakness, decreased sit to stand strength during functional transfers. Please refer to the flowsheet for vital signs taken during this treatment.   After treatment:   [] Patient left in no apparent distress sitting up in chair  [x] Patient left in no apparent distress in bed  [x] Call bell left within reach  [x] Nursing notified  [] Caregiver present  [] Bed alarm activated    COMMUNICATION/EDUCATION:   [x] Role of Occupational Therapy in the acute care setting  [x] Home safety education was provided and the patient/caregiver indicated understanding. [x] Patient/family have participated as able in goal setting and plan of care. [x] Patient/family agree to work toward stated goals and plan of care. [] Patient understands intent and goals of therapy, but is neutral about his/her participation. [] Patient is unable to participate in goal setting and plan of care. Thank you for this referral.  Francisco Live OTD, OTR/L   Time Calculation: 23 mins    Eval Complexity: History: MEDIUM Complexity : Expanded review of history including physical, cognitive and psychosocial  history ; Examination: MEDIUM Complexity : 3-5 performance deficits relating to physical, cognitive , or psychosocial skils that result in activity limitations and / or participation restrictions; Decision Making:MEDIUM Complexity : Patient may present with comorbidities that affect occupational performnce.  Miniml to moderate modification of tasks or assistance (eg, physical or verbal ) with assesment(s) is necessary to enable patient to complete evaluation

## 2022-01-24 NOTE — PROGRESS NOTES
Reason for Admission:   Leg swelling                    RUR Score:  Mod; 16%                PCP: First and Last name:   Gerardo Harris MD     Name of Practice:    Are you a current patient: Yes/No:    Approximate date of last visit:    Can you participate in a virtual visit if needed:     Do you (patient/family) have any concerns for transition/discharge? Plan for utilizing home health:   [facility]    Current Advanced Directive/Advance Care Plan:  Full Code      Healthcare Decision Maker:   Click here to complete [address] including selection of the Healthcare Decision Maker Relationship (ie \"Primary\")              Transition of Care Plan:   [facility] with physician follow up       Chart reviewed. Per H&P \"Karyn Jones is a 68 y.o. female with cirrhosis with ascites, CKD 3, anemia is sent  to ER per Dr. Alondra Melendez due to worsening swelling. She noted her swelling in legs and abdomen become worsening. She visited Dr. Paula Mitchell recently and recommend to go to ER for further evaluation and treatment. She reported that she never has paracentesis done. She was found h/h was low, occult stool negative. She denies any black stool, or active bleeding. No chest pain, but sob on exertion. She is NOT vaccinated for covid 19 -she said she is \"allergic to too many things\". \"    Noted pt with therapy and Palliative Care consults pending. CM to await outcome of consults to assist with identifying a transition of care. CM to continue to follow and assist.    1115:  CM met with pt at bedside to discuss transition of care. Pt lives with her son and daughter in law. Pt has a wheel chair, RW and a shower chair available if needed. Pt has given permission for CM to contact her son to further assist with transition of care. Anticipate pt will transition home with [facility] and physicain follow up vs SNF.   CM to continue to follow and assist.    0260:  CM contacted pt's son, Moncho Ruby Eusebia Shahriar ([phone]), and have been notified pt lives with him and his wife, Katelyn Singh. He has confirmed pt's DME and indicated she also has a walk in tub. CM discussed transition of care needs. Family has indicated plan is for pt to return home upon discharge. CM discussed HH as an option. Family has indicated pt has used [facility] in the past and would like to use this agency again if [facility] is needed. Anticipate pt will transition home with physician follow up vs home with [address] [place] [address],[facility] and physician follow up.   CM to continue to follow and assist.      Care Management Interventions  Transition of Care Consult (CM Consult): Discharge [address] Ruelas: No  Reason Outside [place]: Patient already serviced by other home care/hospice agency  Health Maintenance Reviewed: Yes  Physical Therapy Consult: Yes  Occupational Therapy Consult: Yes  Support Systems: Child(daniel),Other Family Member(s)  Confirm Follow Up Transport: Family  The Plan for Transition of Care is Related to the Following Treatment Goals : [facility] with physician follow up  The Patient and/or Patient Representative was Provided with a Choice of Provider and Agrees with the Discharge Plan?: Yes  Name of the Patient Representative Who was Provided with a Choice of Provider and Agrees with the Discharge Plan: son  Freedom of Choice List was Provided with Basic Dialogue that Supports the Patient's Individualized Plan of Care/Goals, Treatment Preferences and Shares the Quality Data Associated with the Providers?: Yes  Discharge Location  Patient Expects to be Discharged to[de-identified] Home with home health ([facility])

## 2022-01-25 ENCOUNTER — ANESTHESIA EVENT (OUTPATIENT)
Dept: ENDOSCOPY | Age: 77
DRG: 432 | End: 2022-01-25
Payer: MEDICARE

## 2022-01-25 PROBLEM — D64.9 SEVERE ANEMIA: Status: ACTIVE | Noted: 2022-01-25

## 2022-01-25 LAB
APTT PPP: 47.2 SEC (ref 23–36.4)
BASOPHILS # BLD: 0 K/UL (ref 0–0.1)
BASOPHILS NFR BLD: 0 % (ref 0–2)
DIFFERENTIAL METHOD BLD: ABNORMAL
EOSINOPHIL # BLD: 0.1 K/UL (ref 0–0.4)
EOSINOPHIL NFR BLD: 5 % (ref 0–5)
ERYTHROCYTE [DISTWIDTH] IN BLOOD BY AUTOMATED COUNT: 17.6 % (ref 11.6–14.5)
FERRITIN SERPL-MCNC: 330 NG/ML (ref 8–388)
HAPTOGLOB SERPL-MCNC: <8 MG/DL (ref 30–200)
HCT VFR BLD AUTO: 15.2 % (ref 35–45)
HGB BLD-MCNC: 4.8 G/DL (ref 12–16)
IMM GRANULOCYTES # BLD AUTO: 0 K/UL (ref 0–0.04)
IMM GRANULOCYTES NFR BLD AUTO: 0 % (ref 0–0.5)
INR PPP: 2.3 (ref 0.8–1.2)
LDH SERPL L TO P-CCNC: 230 U/L (ref 81–234)
LYMPHOCYTES # BLD: 0.5 K/UL (ref 0.9–3.6)
LYMPHOCYTES NFR BLD: 21 % (ref 21–52)
MCH RBC QN AUTO: 34.8 PG (ref 24–34)
MCHC RBC AUTO-ENTMCNC: 31.6 G/DL (ref 31–37)
MCV RBC AUTO: 110.1 FL (ref 78–100)
MONOCYTES # BLD: 0.3 K/UL (ref 0.05–1.2)
MONOCYTES NFR BLD: 12 % (ref 3–10)
NEUTS SEG # BLD: 1.6 K/UL (ref 1.8–8)
NEUTS SEG NFR BLD: 62 % (ref 40–73)
NRBC # BLD: 0 K/UL (ref 0–0.01)
NRBC BLD-RTO: 0 PER 100 WBC
PERIPHERAL SMEAR,PSM: NORMAL
PLATELET # BLD AUTO: 41 K/UL (ref 135–420)
PLATELET COMMENTS,PCOM: ABNORMAL
PMV BLD AUTO: 10.6 FL (ref 9.2–11.8)
PROTHROMBIN TIME: 24 SEC (ref 11.5–15.2)
RBC # BLD AUTO: 1.38 M/UL (ref 4.2–5.3)
RBC MORPH BLD: ABNORMAL
RETICS/RBC NFR AUTO: 4.1 % (ref 0.5–2.5)
WBC # BLD AUTO: 2.5 K/UL (ref 4.6–13.2)

## 2022-01-25 PROCEDURE — 65270000029 HC RM PRIVATE

## 2022-01-25 PROCEDURE — 88184 FLOWCYTOMETRY/ TC 1 MARKER: CPT

## 2022-01-25 PROCEDURE — 82668 ASSAY OF ERYTHROPOIETIN: CPT

## 2022-01-25 PROCEDURE — 94640 AIRWAY INHALATION TREATMENT: CPT

## 2022-01-25 PROCEDURE — 74011250637 HC RX REV CODE- 250/637: Performed by: HOSPITALIST

## 2022-01-25 PROCEDURE — 85025 COMPLETE CBC W/AUTO DIFF WBC: CPT

## 2022-01-25 PROCEDURE — 36415 COLL VENOUS BLD VENIPUNCTURE: CPT

## 2022-01-25 PROCEDURE — 82728 ASSAY OF FERRITIN: CPT

## 2022-01-25 PROCEDURE — 97116 GAIT TRAINING THERAPY: CPT

## 2022-01-25 PROCEDURE — 99233 SBSQ HOSP IP/OBS HIGH 50: CPT | Performed by: NURSE PRACTITIONER

## 2022-01-25 PROCEDURE — 83010 ASSAY OF HAPTOGLOBIN QUANT: CPT

## 2022-01-25 PROCEDURE — 83615 LACTATE (LD) (LDH) ENZYME: CPT

## 2022-01-25 PROCEDURE — 74011250636 HC RX REV CODE- 250/636: Performed by: HOSPITALIST

## 2022-01-25 PROCEDURE — 85045 AUTOMATED RETICULOCYTE COUNT: CPT

## 2022-01-25 PROCEDURE — 74011000250 HC RX REV CODE- 250: Performed by: HOSPITALIST

## 2022-01-25 PROCEDURE — 85730 THROMBOPLASTIN TIME PARTIAL: CPT

## 2022-01-25 PROCEDURE — 99233 SBSQ HOSP IP/OBS HIGH 50: CPT | Performed by: INTERNAL MEDICINE

## 2022-01-25 PROCEDURE — 88185 FLOWCYTOMETRY/TC ADD-ON: CPT

## 2022-01-25 PROCEDURE — 85610 PROTHROMBIN TIME: CPT

## 2022-01-25 PROCEDURE — P9047 ALBUMIN (HUMAN), 25%, 50ML: HCPCS | Performed by: HOSPITALIST

## 2022-01-25 RX ADMIN — ALBUMIN (HUMAN) 25 G: 0.25 INJECTION, SOLUTION INTRAVENOUS at 12:42

## 2022-01-25 RX ADMIN — ALBUMIN (HUMAN) 25 G: 0.25 INJECTION, SOLUTION INTRAVENOUS at 18:15

## 2022-01-25 RX ADMIN — ESCITALOPRAM OXALATE 20 MG: 10 TABLET ORAL at 09:24

## 2022-01-25 RX ADMIN — SODIUM CHLORIDE, PRESERVATIVE FREE 10 ML: 5 INJECTION INTRAVENOUS at 22:00

## 2022-01-25 RX ADMIN — ALBUTEROL SULFATE 2.5 MG: 2.5 SOLUTION RESPIRATORY (INHALATION) at 15:06

## 2022-01-25 RX ADMIN — NYSTATIN: 100000 POWDER TOPICAL at 20:17

## 2022-01-25 RX ADMIN — SODIUM CHLORIDE, PRESERVATIVE FREE 10 ML: 5 INJECTION INTRAVENOUS at 05:12

## 2022-01-25 RX ADMIN — PRAVASTATIN SODIUM 10 MG: 20 TABLET ORAL at 22:00

## 2022-01-25 RX ADMIN — GABAPENTIN 300 MG: 300 CAPSULE ORAL at 09:24

## 2022-01-25 RX ADMIN — ALBUTEROL SULFATE 2.5 MG: 2.5 SOLUTION RESPIRATORY (INHALATION) at 10:24

## 2022-01-25 RX ADMIN — PANTOPRAZOLE SODIUM 40 MG: 40 TABLET, DELAYED RELEASE ORAL at 09:24

## 2022-01-25 RX ADMIN — ALBUMIN (HUMAN) 25 G: 0.25 INJECTION, SOLUTION INTRAVENOUS at 05:03

## 2022-01-25 RX ADMIN — ALBUTEROL SULFATE 2.5 MG: 2.5 SOLUTION RESPIRATORY (INHALATION) at 20:47

## 2022-01-25 RX ADMIN — SODIUM CHLORIDE, PRESERVATIVE FREE 10 ML: 5 INJECTION INTRAVENOUS at 14:34

## 2022-01-25 RX ADMIN — NYSTATIN: 100000 POWDER TOPICAL at 09:24

## 2022-01-25 RX ADMIN — PANTOPRAZOLE SODIUM 40 MG: 40 TABLET, DELAYED RELEASE ORAL at 20:19

## 2022-01-25 RX ADMIN — ALBUMIN (HUMAN) 25 G: 0.25 INJECTION, SOLUTION INTRAVENOUS at 00:51

## 2022-01-25 RX ADMIN — FOLIC ACID 1 MG: 1 TABLET ORAL at 09:24

## 2022-01-25 RX ADMIN — LEVOTHYROXINE SODIUM 75 MCG: 0.07 TABLET ORAL at 06:24

## 2022-01-25 NOTE — PROGRESS NOTES
Hospitalist Progress Note-critical care note     Patient: Debbie Oreilly MRN: [id number]  CSN: [id number]    YOB: 1945  Age: 68 y.o. Sex: female    DOA: 1/23/2022 LOS:  LOS: 2 days            Chief complaint: severe anemia cirrhosis, gerd ,ascites, ckd3     Assessment/Plan         Hospital Problems  Date Reviewed: 1/23/2022          Codes Class Noted POA    COVID-19 vaccine dose not administered ICD-10-CM: Z28.9  ICD-9-CM: V64.00  1/23/2022 Unknown        Cirrhosis ([facility] ICD-10-CM: K74.60  ICD-9-CM: 571.5  Unknown Yes        Hypertension ICD-10-CM: I10  ICD-9-CM: 401.9  Unknown Yes    Overview Signed 12/11/2021 10:59 AM by Phillip Harry MD     well controlled with meds patient states             GERD (gastroesophageal reflux disease) ICD-10-CM: K21.9  ICD-9-CM: 530.81  Unknown Yes        * (Principal) Anasarca ICD-10-CM: R60.1  ICD-9-CM: 656. 3  Unknown Unknown        Ascites ICD-10-CM: R18.8  ICD-9-CM: 789.59  Unknown Yes        Stage 3 chronic kidney disease ([facility] ICD-10-CM: N18.30  ICD-9-CM: 844. 3  Unknown Yes        Hypothyroidism ICD-10-CM: E03.9  ICD-9-CM: 244.9  Unknown Yes               anasarca   Mild improving   due to hypoalbuminemia s/p cirrhosis   albumin infusion as scheduled      Cirrhosis with ascites   Cirrhosis is secondary to NAFLD   Dr. Omar Plaza f/u   Never had paracentesis done before   Ammonia level 57 no hx of HE on admission         anemia -sob on exertion-symptomatic ,macrocytic -refuse blood transfusion -discussed with pt 4 times since admission   Continue trending down, no black stool noted    ct abdomen/pelvic negative for  hematoma   occult stool negative in Er    b12 and floate elevated ,iron 104   Appreciated hematologist on board          Thrombocytopenia   secondary to cirrhosis.   There is no evidence of overt bleeding.    No treatment is required.     ckd 3   F/u with [place] nephrologist  Cr is stable at her baseline   Avoid IV contrast renal dose medicine      HTN, accelerated  Continue home medication. Hypothyroidism  continue synthroid        Rash   Nystatin powder    Subjective: I feel ok, I do not want to have blood transfusion     LDiscussed with pt about anemia-can be life-threatening if not corrected, such as stroke, heart attack, syncope, not just sob and fatigue. she indicated understanding. She refuses. Disposition :till edema better   Review of systems:    General: No fevers or chills. Cardiovascular: No chest pain or pressure. No palpitations. Pulmonary: + shortness of breath on exertion   Gastrointestinal: No nausea, vomiting. Vital signs/Intake and Output:  Visit Vitals  BP (!) 110/52   Pulse 74   Temp 98 °F (36.7 °C)   Resp 16   Ht 5' 3\" (1.6 m)   Wt 101.2 kg (223 lb)   SpO2 99%   Breastfeeding No   BMI 39.50 kg/m²     Current Shift:  No intake/output data recorded. Last three shifts:  No intake/output data recorded. Physical Exam:  General: WD, WN. Alert, cooperative, no acute distress    HEENT: NC, Atraumatic. PERRLA, anicteric sclerae. Lungs: CTA Bilaterally. No Wheezing/Rhonchi/Rales. Heart:  Regular  rhythm,  No murmur, No Rubs, No Gallops  Abdomen: + distended, Non tender. +Bowel sounds,   Extremities: No c/c. Edema mild improving   Psych:   Not anxious or agitated. Neurologic:  No acute neurological deficit.              Labs: Results:       Chemistry Recent Labs     01/24/22  0445 01/23/22  1344   GLU 89 101*    138   K 3.8 4.1    107   CO2 27 24   BUN 20* 19*   CREA 2.04* 2.06*   CA 7.5* 7.5*   AGAP 4 7   BUCR 10* 9*   AP 70 94   TP 5.0* 5.3*   ALB 2.6* 2.2*   GLOB 2.4 3.1   AGRAT 1.1 0.7*      CBC w/Diff Recent Labs     01/25/22  0435 01/24/22  0750 01/24/22  0445 01/23/22  1344 01/23/22  1344   WBC 2.5*  --  2.6*  --  4.7   RBC 1.38*  --  1.47*  --  1.77*   HGB 4.8* 5.2* 5.1*   < > 6.3*   HCT 15.2* 16.9* 16.0*   < > 19.8*   PLT 41*  --  48*  --  64*   GRANS 62  --  55  --  61   LYMPH 21  --  24 --  21   EOS 5  --  8*  --  5    < > = values in this interval not displayed. Cardiac Enzymes No results for input(s): CPK, CKND1, LIVE in the last 72 hours. No lab exists for component: CKRMB, TROIP   Coagulation Recent Labs     01/25/22  0815   PTP 24.0*   INR 2.3*   APTT 47.2*       Lipid Panel No results found for: CHOL, CHOLPOCT, CHOLX, CHLST, CHOLV, 354570, HDL, HDLP, LDL, LDLC, DLDLP, 734966, VLDLC, VLDL, TGLX, TRIGL, TRIGP, TGLPOCT, CHHD, CHHDX   BNP No results for input(s): BNPP in the last 72 hours. Liver Enzymes Recent Labs     01/24/22  0445   TP 5.0*   ALB 2.6*   AP 70      Thyroid Studies Lab Results   Component Value Date/Time    TSH 9.94 (H) 01/23/2022 01:44 PM        Procedures/imaging: see electronic medical records for all procedures/Xrays and details which were not copied into this note but were reviewed prior to creation of Plan    No results found.     Kamron Culp MD

## 2022-01-25 NOTE — PROGRESS NOTES
Problem: Mobility Impaired (Adult and Pediatric)  Goal: *Acute Goals and Plan of Care (Insert Text)  Description: Physical Therapy Goals   Initiated 1/24/2022 and to be accomplished within 5 day(s)  1. Patient will move from supine <> sit with mod I in prep for out of bed activity and change of position. 2.  Patient will perform sit<> stand with mod I with RW in prep for transfers/ambulation. 3.  Patient will transfer from bed <> chair with mod I with RW for time up in chair for completion of ADL activity. 4.  Patient will ambulate 100 feet with mod I/RW for improved functional mobility at discharge. 5.  Patient will ascend/descend 2 stairs with handrail(s) with minimal assistance/contact guard assist for home re-entry as needed. Outcome: Progressing Towards Goal   PHYSICAL THERAPY TREATMENT    Patient: Bernardo Ortiz (28 y.o. female)  Date: 1/25/2022  Diagnosis: Anasarca [R60.1] Anasarca  Procedure(s) (LRB):  ESOPHAGOGASTRODUODENOSCOPY (EGD) (N/A)    Precautions: Fall  PLOF: ambulatory with a RW, has a w/c, lives with son    ASSESSMENT:  Pt in bed upon arrival.  No assistance needed to sit up EOB. Elevated bed to height of bed at home, no difficulty performing sit to stand. Ambulated with RW, 60ft, wide MADHU, steady slow pace, no LOB or path deviations. Progression toward goals:   [x]      Improving appropriately and progressing toward goals  []      Improving slowly and progressing toward goals  []      Not making progress toward goals and plan of care will be adjusted     PLAN:  Patient continues to benefit from skilled intervention to address the above impairments. Continue treatment per established plan of care. Discharge Recommendations:  Home Health  Further Equipment Recommendations for Discharge:  N/A     SUBJECTIVE:   Patient stated I am just weezing.     OBJECTIVE DATA SUMMARY:   Critical Behavior:  Neurologic State: Alert,Appropriate for age  Orientation Level: Oriented X4  Cognition: Follows commands  Safety/Judgement: Awareness of environment,Fall prevention  Functional Mobility Training:  Bed Mobility:    Supine to Sit: Supervision  Sit to Supine: Stand-by assistance  Transfers:  Sit to Stand: Stand-by assistance  Stand to Sit: Stand-by assistance  Balance:  Sitting: Intact  Standing: Intact; With support   Ambulation/Gait Training:  Distance (ft): 60 Feet (ft)  Assistive Device: Gait belt;Walker, rolling  Ambulation - Level of Assistance: Stand-by assistance  Gait Abnormalities: Decreased step clearance    Base of Support: Widened        Pain:  Pain level pre-treatment: 0/10  Pain level post-treatment: 8/10 R foot   Pain Intervention(s): Medication (see MAR); Rest, Ice, Repositioning   Response to intervention: Nurse notified, See doc flow    Activity Tolerance:   Fair  Please refer to the flowsheet for vital signs taken during this treatment. After treatment:   [] Patient left in no apparent distress sitting up in chair  [x] Patient left in no apparent distress in bed  [x] Call bell left within reach  [x] Nursing notified  [] Caregiver present  [] Bed alarm activated  [] SCDs applied      COMMUNICATION/EDUCATION:   [x]         Role of Physical Therapy in the acute care setting. [x]         Fall prevention education was provided and the patient/caregiver indicated understanding. [x]         Patient/family have participated as able in working toward goals and plan of care. [x]         Patient/family agree to work toward stated goals and plan of care. []         Patient understands intent and goals of therapy, but is neutral about his/her participation.   []         Patient is unable to participate in stated goals/plan of care: ongoing with therapy staff.  []         Other:        Rishi Bolton PTA   Time Calculation: 12 mins

## 2022-01-25 NOTE — PROGRESS NOTES
Patient calm and cooperative during morning assessment. Patient continues to have edema to BLE; Bowels active. Patient lungs diminished and expiratory wheezing with dyspnea noted after ambulating to the restroom with walker. Patient receiving PRN breathing tx for wheezing and states these are effective. Patient reports no pain at rest but has some in her right knee when ambulating. Nystatin powder applied to bilateral breasts and groin per order, only scant redness noted in these areas. IV site in right forearm patent and flushing. No further concerns at this time.

## 2022-01-25 NOTE — PROGRESS NOTES
Palliative Medicine Consult    Patient Name: Casandra Marquez  YOB: 1945    Date of follow up: 1/25/2022  Reason for Consult: Goals of care discussions  Requesting Provider: Dr. Andi Franklin  Primary Care Physician: Rebekah Morel MD      SUMMARY:   Casandra Marquez is a 68 y.o. with a past history of cirrhosis with ascites, chronic kidney disease stage III, fibromyalgia, and hypertension who was admitted on 1/23/2022 from home (lives with son Leatha Beth and daughter-in-law Columba Bazzi) with a diagnosis of severe anemia, cirrhosis, ascites, and chronic kidney disease stage 3. Current medical issues leading to Palliative Medicine involvement include: Support and goals of care discussions. 1/25/2022: Patient is awake, alert, and remains oriented. Family and patient discussed goals of care last night. Patient does not want a blood transfusion for any reason. She otherwise wants to continue full code with full interventions. PALLIATIVE DIAGNOSES:   1. Goals of care discussions  2. Severe anemia  3. Cirrhosis with ascites  4. Chronic kidney disease stage III  5. Advanced age/debility       PLAN:   1/25/2022: Palliative medicine team including Krystin Butler. And I met with patient at patient's bedside. Patient's son Leatha Beth was on video chat during the visit. Patient is awake, alert, and remains oriented. Family and patient discussed goals of care last night. Patient does not want a blood transfusion for any reason. She does not want someone else's blood in her body, and she is not a Pentec[employer]al, but does believe in some of the Scientology beliefs behind accepting someone else's blood. She is accepting of albumin and is ok with receiving all other alternative blood products. Hematology following with plans to attempt optimizing her Hg levels without blood transfusions. Patient continues to receive IV albumin and edema is improving slowly- hepatologist Dr. Dinesh Felix continues to follow.  Patient asked when she was being discharged. Explained that her blood count is critically low and we are continuing to remove fluid from her body, and it is unknown at this time her anticipated discharge date. Reiterated that if the hospitalization becomes too difficult and her goal is to go home, we could readdress goals of care to include comfort measures and hospice support. Ro Jansen was on the phone during entire conversation and understands her prognosis is poor and agrees to continue talking with patient regarding the possibility of home with hospice. At this time patient wants to remain a full code with full interventions, but no blood. We will continue to follow closely for support and further goals of care discussions. See previous discussions below:     1/24/2022: Goals of care discussions: Palliative medicine team including Cale Astudillo RN and I met with patient at patient's bedside. Patient is awake, alert, and oriented x4. Patient has just completed an advanced medical directive with the  (appreciate assistance) where she assigned her primary medical power of  as her son Uriah Sood, and her secondary medical power of  as daughter-in-law Michel Gardner. Patient also noted she would want all life prolonging measures even in the setting of terminal illness and unawareness. She is receiving albumin infusions for anasarca, she has never had paracentesis done before. She is experiencing severe anemia with shortness of breath on exertion, she is undecided on whether she will accept blood products. She notes that she is not a [employer], but does believe in some of the Tenriism believes behind accepting someone else's blood. Patient wanted her son Ro Jansen and daughter-in-law Marlane Kussmaul included in goals of care conversation, called them on phone in patient's room and placed them on speaker phone.   Discussed the benefits and burdens of continuing aggressive measures in the setting of advanced liver disease and severe anemia, including potential need for multiple blood transfusions. Also discussed transitioning to comfort measures with the support of hospice at discharge. Family is coming to the hospital today to speak with patient in person, advised them to notify RN should patient become agreeable to blood products, to ensure no delay in care. At this time patient is a full code with full interventions but is unclear if she wants blood transfusion or not. We will follow up tomorrow to readdress goals of care once patient and family have an opportunity to talk today. 1. Severe anemia: Symptomatic, shortness of breath on exertion. She has been refusing blood transfusions, stating she really needs to think about it. No black stool noted, CT abdomen pelvis pending for rule out hematoma. Occult stool negative in ER. Plan for hematology consult if no hematoma noted on CT per primary team.  Last hemoglobin 5.2 g/dL. 2. Cirrhosis with ascites: Followed by Dr. Ricky Godwin, came in due to increasing fluid buildup and need for albumin transfusions. Her MELD was recently calculated at 25. Reviewed most recent office visit with Dr. Ricky Godwin, patient is not ready for hospice care. 3. Chronic kidney disease stage III: Monitoring per primary team  4. Advanced age/debility: 42-year-old female who lives with her son and daughter-in-law. She receives care from [facility], needs assistance with all functional ADLs. She has a wheelchair, rolling walker, and a shower chair as needed. 5. Initial consult note routed to primary continuity provider  6.  Communicated plan of care with: Palliative IDT       GOALS OF CARE / TREATMENT PREFERENCES:   [====Goals of Care====]  GOALS OF CARE: Full code with full interventions  Patient/Health Care Proxy Stated Goals: Prolong life      TREATMENT PREFERENCES:   Code Status: Full Code    Advance Care Planning:  Advance Care Planning 1/24/2022   Patient's Healthcare Decision Maker is: Named in scanned ACP document   Primary Decision Maker Name -   Primary Decision Maker Phone Number -   Primary Decision Maker Relationship to Patient -   Confirm Advance Directive Yes, on file   Patient Would Like to Complete Advance Directive -       Medical Interventions: Full interventions           The palliative care team has discussed with patient / health care proxy about goals of care / treatment preferences for patient.  [====Goals of Care====]         HISTORY:     History obtained from: patient, family, chart    CHIEF COMPLAINT: fluid in legs and abdomen    HPI/SUBJECTIVE:    The patient is:   [x] Verbal and participatory  [] Non-participatory due to:   Oriented x 4    Clinical Pain Assessment (nonverbal scale for severity on nonverbal patients):   Clinical Pain Assessment  Severity: 0            FUNCTIONAL ASSESSMENT:     Palliative Performance Scale (PPS):   PPS: 40       PSYCHOSOCIAL/SPIRITUAL SCREENING:     Advance Care Planning:  Advance Care Planning 1/24/2022   Patient's Healthcare Decision Maker is: Named in scanned ACP document   Primary Decision Maker Name -   Primary Decision Maker Phone Number -   Primary Decision Maker Relationship to Patient -   Confirm Advance Directive Yes, on file   Patient Would Like to Complete Advance Directive -        Any spiritual / Confucianism concerns:  [] Yes /  [x] No    Caregiver Burnout:  [] Yes /  [] No /  [x] No Caregiver Present      Anticipatory grief assessment:   [] Normal  / [x] Maladaptive            REVIEW OF SYSTEMS:     Positive and pertinent negative findings in ROS are noted above in HPI. The following systems were [x] reviewed / [] unable to be reviewed as noted in HPI  Other findings are noted below. Systems: constitutional, ears/nose/mouth/throat, respiratory, gastrointestinal, genitourinary, musculoskeletal, integumentary, neurologic, psychiatric, endocrine. Positive findings noted below.   Modified ESAS Completed by: provider   Fatigue: 5       Pain: 0   Anxiety: 0 Nausea: 0     Dyspnea: 3 (on exertion)     Constipation: No     Stool Occurrence(s): 1        PHYSICAL EXAM:     From RN flowsheet:  Wt Readings from Last 3 Encounters:   01/23/22 101.2 kg (223 lb)   01/19/22 101.2 kg (223 lb)   11/24/21 94.3 kg (208 lb)     Blood pressure (!) 110/52, pulse 74, temperature 98 °F (36.7 °C), resp. rate 16, height 5' 3\" (1.6 m), weight 101.2 kg (223 lb), SpO2 99 %, not currently breastfeeding.     Pain Scale 1: Numeric (0 - 10)  Pain Intensity 1: 0                 Constitutional: Awake, alert, appears chronically ill and debilitated  Eyes: pupils equal, anicteric  ENMT: no nasal discharge, moist mucous membranes  Cardiovascular: regular rhythm, distal pulses intact  Respiratory: breathing not labored, symmetric  Gastrointestinal: firm, distended  Musculoskeletal: no deformity, no tenderness to palpation  Skin: warm, dry  Neurologic: following commands, moving all extremities, oriented x 4  Psychiatric: flat affect, no hallucinations       HISTORY:     Principal Problem:    Anasarca ()    Active Problems:    Cirrhosis (HCC) ()      Hypertension ()      Overview: well controlled with meds patient states      GERD (gastroesophageal reflux disease) ()      Ascites ()      Stage 3 chronic kidney disease (HCC) ()      Hypothyroidism ()      COVID-19 vaccine dose not administered (1/23/2022)      Past Medical History:   Diagnosis Date    Fibromyalgia     GERD (gastroesophageal reflux disease)     Hypertension     well controlled with meds patient states    Liver disease       Past Surgical History:   Procedure Laterality Date    HX BREAST LUMPECTOMY      bilateral    HX CATARACT REMOVAL Right 09/01/2016    HX DILATION AND CURETTAGE      HX GI      colonoscopy, EGD    HX HEENT      sinus surgery    HX KNEE REPLACEMENT Right 08/16/2017    HX MOHS PROCEDURES      bilateral    HX ORTHOPAEDIC      right foot surgery     HX ORTHOPAEDIC      right knee surgery     HX ORTHOPAEDIC      bilateral carpal tunnel release     HX ORTHOPAEDIC Left 2017    foot    HX OTHER SURGICAL      removal of fatty tumor back and abdomen    HX ROTATOR CUFF REPAIR Bilateral     HX TONSILLECTOMY        No family history on file. History reviewed, no pertinent family history. Social History     Tobacco Use    Smoking status: Former Smoker     Quit date:      Years since quittin.0    Smokeless tobacco: Never Used   Substance Use Topics    Alcohol use: No     Allergies   Allergen Reactions    Latex Hives    Acetaminophen Other (comments)     Can not take- has advanced liver disease.     Adhesive Tape-Silicones Other (comments)     Skin burns    Aspirin Other (comments)     Dr. Sara Khan does not want pt to take it     Codeine Nausea and Vomiting and Other (comments)    Dilaudid [Hydromorphone] Nausea and Vomiting     Patient also complained of weakness    Doxycycline Hives and Nausea and Vomiting    Hydrocodone Nausea and Vomiting     Elevated blood pressure, effected her eyes, weakness    Oxycodone Itching and Nausea and Vomiting     Patient complained of vision disturbance    Penicillins Hives and Other (comments)     \" I go blind\"    Sulfa (Sulfonamide Antibiotics) Unknown (comments)      Current Facility-Administered Medications   Medication Dose Route Frequency    pantoprazole (PROTONIX) tablet 40 mg  40 mg Oral BID    sodium chloride (NS) flush 5-40 mL  5-40 mL IntraVENous Q8H    sodium chloride (NS) flush 5-40 mL  5-40 mL IntraVENous PRN    bisacodyL (DULCOLAX) suppository 10 mg  10 mg Rectal DAILY PRN    promethazine (PHENERGAN) tablet 12.5 mg  12.5 mg Oral Q6H PRN    Or    ondansetron (ZOFRAN) injection 4 mg  4 mg IntraVENous Q6H PRN    nystatin (MYCOSTATIN) 100,000 unit/gram powder   Topical BID    gabapentin (NEURONTIN) capsule 300 mg  300 mg Oral DAILY    levothyroxine (SYNTHROID) tablet 75 mcg  75 mcg Oral ACB  pravastatin (PRAVACHOL) tablet 10 mg  10 mg Oral QHS    folic acid (FOLVITE) tablet 1 mg  1 mg Oral DAILY    escitalopram oxalate (LEXAPRO) tablet 20 mg  20 mg Oral DAILY    albuterol (PROVENTIL VENTOLIN) nebulizer solution 2.5 mg  2.5 mg Nebulization Q4H PRN    albumin human 25% (BUMINATE) solution 25 g  25 g IntraVENous Q6H          LAB AND IMAGING FINDINGS:     Lab Results   Component Value Date/Time    WBC 2.5 (L) 01/25/2022 04:35 AM    HGB 4.8 (LL) 01/25/2022 04:35 AM    PLATELET 41 (L) 09/82/8178 04:35 AM     Lab Results   Component Value Date/Time    Sodium 139 01/24/2022 04:45 AM    Potassium 3.8 01/24/2022 04:45 AM    Chloride 108 01/24/2022 04:45 AM    CO2 27 01/24/2022 04:45 AM    BUN 20 (H) 01/24/2022 04:45 AM    Creatinine 2.04 (H) 01/24/2022 04:45 AM    Calcium 7.5 (L) 01/24/2022 04:45 AM      Lab Results   Component Value Date/Time    Alk. phosphatase 70 01/24/2022 04:45 AM    Protein, total 5.0 (L) 01/24/2022 04:45 AM    Albumin 2.6 (L) 01/24/2022 04:45 AM    Globulin 2.4 01/24/2022 04:45 AM     Lab Results   Component Value Date/Time    INR 2.3 (H) 01/25/2022 08:15 AM    Prothrombin time 24.0 (H) 01/25/2022 08:15 AM    aPTT 47.2 (H) 01/25/2022 08:15 AM      Lab Results   Component Value Date/Time    Iron 104 01/23/2022 01:44 PM    TIBC 165 (L) 01/23/2022 01:44 PM    Iron % saturation 63 (H) 01/23/2022 01:44 PM      No results found for: PH, PCO2, PO2  No components found for: GLPOC   No results found for: CPK, CKMB             Total time: 35 minutes  Counseling / coordination time, spent as noted above:   > 50% counseling / coordination?: yes, patient, family, and medical team    Prolonged service was provided for  []30 min   []75 min in face to face time in the presence of the patient, spent as noted above. Time Start:   Time End:   Note: this can only be billed with 22966 (initial) or 96134 (follow up). If multiple start / stop times, list each separately.

## 2022-01-25 NOTE — ROUTINE PROCESS
Bedside and Verbal shift change report given to KEMI Duncan RN (oncoming nurse) by ROSALINDA Ross RN (offgoing nurse). Report included the following information SBAR, Kardex, Intake/Output and MAR.

## 2022-01-25 NOTE — PROGRESS NOTES
Problem: Falls - Risk of  Goal: *Absence of Falls  Description: Document [place] [place] Fall Risk and appropriate interventions in the flowsheet.   Outcome: Progressing Towards Goal  Note: Fall Risk Interventions:  Mobility Interventions: Patient to call before getting OOB,Utilize walker, cane, or other assistive device    Mentation Interventions: Adequate sleep, hydration, pain control    Medication Interventions: Patient to call before getting OOB,Teach patient to arise slowly    Elimination Interventions: Patient to call for help with toileting needs,Toilet paper/wipes in reach,Call light in reach              Problem: General Medical Care Plan  Goal: *Vital signs within specified parameters  Outcome: Progressing Towards Goal  Goal: *Absence of infection signs and symptoms  Outcome: Progressing Towards Goal  Goal: *Optimal pain control at patient's stated goal  Outcome: Progressing Towards Goal  Goal: *Skin integrity maintained  Outcome: Progressing Towards Goal  Goal: *Fluid volume balance  Outcome: Progressing Towards Goal  Goal: *Optimize nutritional status  Outcome: Progressing Towards Goal  Goal: *Anxiety reduced or absent  Outcome: Progressing Towards Goal  Goal: *Progressive mobility and function (eg: ADL's)  Outcome: Progressing Towards Goal

## 2022-01-25 NOTE — PROGRESS NOTES
D/C Plan: [facility] and physician follow up vs [facility]    CM met with pt at bedside to discuss transition of care. Pt was on the phone via face time with her son during visit. Family has expressed that pt does not want any blood products. Family is aware of the repercussions of not having this method of treatment. Family has stated that if that means pt will have to transition home with hospice. Family is aware HH has been recommended and they have restated they would like to use [facility] and if hospice is needed they would like to use [facility]. CM to continue to follow and assist.    Care Management Interventions  Mode of Transport at Discharge:  Other (see comment) (Family)  Transition of Care Consult (CM Consult): [address]: No  Reason Outside [place]: Patient already serviced by other home care/hospice agency  Health Maintenance Reviewed: Yes  Physical Therapy Consult: Yes  Occupational Therapy Consult: Yes  Support Systems: Child(daniel),Other Family Member(s)  Confirm Follow Up Transport: Family  The Plan for Transition of Care is Related to the Following Treatment Goals : [facility] follow [facility] vs [facility]  The Patient and/or Patient Representative was Provided with a Choice of Provider and Agrees with the Discharge Plan?: Yes  Name of the Patient Representative Who was Provided with a Choice of Provider and Agrees with the Discharge Plan: pt  Freedom of Choice List was Provided with Basic Dialogue that Supports the Patient's Individualized Plan of Care/Goals, Treatment Preferences and Shares the Quality Data Associated with the Providers?: Yes  Discharge Location  Patient Expects to be Discharged to[de-identified] Home with home health

## 2022-01-25 NOTE — PROGRESS NOTES
2003-alert and oriented x 4. Lungs CTA and diminished on RA. IV access to left FA in place and flushed without difficulty. Pain rated at 0/10. Up to bathroom with walker and one assist. Edema, non pitting to LE at 3+ to right and 2+ to left. 0526-Critical H&H of 4.8/15.2 called by Rigoberto Berry in lab. Dr. Maya Ward informed. No new orders. Shift summary-up to bathroom with walker and one assist. H&H of 4.8 and 15.2 this am. No other changes to health status.

## 2022-01-25 NOTE — PROGRESS NOTES
Received call from patient son whom stated he is concerned with his mom having multiple blood draws due to her trending low blood levels. This nurse informed family of the patients right to refuse blood draws or to express her desire to change her plan of care.  notified of families concerns. Palliative and  currently working with patient on meeting their care goals. No further concerns at this time.

## 2022-01-25 NOTE — PROGRESS NOTES
[address] [facility] [phone]  DR. BRICENO'[facility] [phone]        Palliative Care Support: This writer, along with Christine Camilo NP, with the Palliative Care team; visited with patient today to offer support and to also follow up regarding goals of care and her decision regarding receiving blood. Patient was in bed and having a [employer] call with her son (Fran Bishop, UQ#[phone]). Heriberto Trinh remained on [employer] for this visit. Patient was asked again regarding her goals of care and her decision to NOT receive blood, if needed. Patient remained very clear that she would not want any blood, even if it was medically indicated that she needed the blood. Her son verified that the patient has told him the same thing. Patient and the son were educated on the outcome of not receiving blood, if patient needs the blood. They both understand the risks of not agreeing to receive the blood. Patient also made it clear that she is okay with CPR, in the event her heart and breathing stops. Patient and her son were re-educated on the risks and burdens of CPR and NOT receiving blood. They were informed that the Palliative Care team will support her decision regardless of what decision she makes. The Palliative Care team suggested that patient and her son continue the conversation regarding goals of care. The option of hospice was also introduced and the patient and her son will discuss the possibility of discharging home with hospice. At this time, patient will remain a FULL CODE WITH FULL INTERVENTIONS and NO BLOOD. Recommendations: The Palliative Care team will continue to offer support to patient and her family and will follow up with goals of care decisions. Lashon Crawley., [facility] [place] [facility]   ED#[phone]

## 2022-01-25 NOTE — CONSULTS
Phone: [phone]  Paging : [phone]      Hematology / Oncology Initial Consult Note    Admit Date: 1/23/2022    Reason for Consult: Anemia    Requesting Physician: Dr. Kelly Lacy    Assessment:     Rice Gilford is a 68 y.o., WHITE/NON-, female, who I have been asked to see for anemia. 1. Macrocytic Anemia: Hg 4.8, . Symptomatic with fatigue and SOB. Vitamin B12 and folate within normal. Ferritin is elevated. Denies any bleeding. Stool occult neg. CT A/P with no hematoma. TSH is elevated suggesting her hypothyroid may not be controlled. It is likely multifactorial from thyroid disease, CKD, and liver disease given splenomegaly. Will send labs to evaluate for hemolysis and bone marrow failure. She does not want a blood transfusion. 2. Thrombocytopenia: Plt 41. Likely due to splenomegaly vs possible ITP. No e/o bleed. Will monitor    3. CKD Stage 3: likely contributing to her anemia. Will check Epo level    4. Cirrhosis with Anasarca: receiving Albumin under care of Hepatology    Plan:     - Labs today with Erythropoietin level, PT/INR, PTT, Peripheral smear, Flow cytometry, LDH, Hapto, Retic  - Will try to optimize her Hg levels without blood transfusion- possible Epo injection  - Will continue to follow    Shakira Walton MD  [facility]  Cell [phone]      History of Present Illness: Rice Gilford is a 68 y.o. 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 She is regularly followed at [place] [address] by Dr. Jennifer Ricardo. She was last seen in clinic on 12/23/21 where she has noted to have a Hg of 6.7 and platelets of 56. It was thought to be due to splenomegaly. She was ordered to have a CT A/P and a Bone Marrow Biopsy for further evaluation, but these were not completed. In the ED, vitals were within normal. Labs were significant for Hg 5.1 and platelet 48. CT A/P showed cirrhosis with sequelae of portal hypertension including splenomegaly 17cm. This am, she reports feeling tired and with shortness of breath with exertion. She has not been vaccinated against COVID. She denies any chest pain. Denies bruising, back pain, hematochezia, hematemesis, or melena. Denies fevers, night sweats, and weight loss. She clearly expressed that she does not want to receive a blood transfusion. She doesn't like the idea of having someone else's blood in her. She is ok receiving alternative blood products. ROS:    Constitutional: No fever, chills, diaphoresis, activity change, appetite change, fatigue or unexpected weight change. HEENT: No sore throat, rhinorrhea,or visual disturbance. Respiratory: No cough, chest tightness, shortness of breath. Cardiovascular:No chest pain, palpitations. Gastrointestinal:No nausea, vomiting, diarrhea, constipation, early satiety, abdominal distention, abdominal pain. Genitourinary: No dysuria, urgency, frequency, hematuria, flank pain, difficulty urinating. Neurological: No headache, dizziness, weakness, tingling and numbness or fall. Musculoskeletal: No bone pain or back pain. No arthralgia or myalgia. No bruise/bleed easily. No extremity swelling.       Past Medical History:   Diagnosis Date    Fibromyalgia     GERD (gastroesophageal reflux disease)     Hypertension     well controlled with meds patient states    Liver disease        Past Surgical History:   Procedure Laterality Date    HX BREAST LUMPECTOMY      bilateral    HX CATARACT REMOVAL Right 09/01/2016    HX DILATION AND CURETTAGE      HX GI      colonoscopy, EGD    HX HEENT      sinus surgery    HX KNEE REPLACEMENT Right 08/16/2017    HX MOHS PROCEDURES      bilateral    HX ORTHOPAEDIC      right foot surgery     HX ORTHOPAEDIC      right knee surgery     HX ORTHOPAEDIC      bilateral carpal tunnel release     HX ORTHOPAEDIC Left 2017    foot  HX OTHER SURGICAL      removal of fatty tumor back and abdomen    HX ROTATOR CUFF REPAIR Bilateral     HX TONSILLECTOMY         No family history on file. Social History     Socioeconomic History    Marital status:    Tobacco Use    Smoking status: Former Smoker     Quit date:      Years since quittin.0    Smokeless tobacco: Never Used   Substance and Sexual Activity    Alcohol use: No    Drug use: No       Current Facility-Administered Medications   Medication Dose Route Frequency    pantoprazole (PROTONIX) tablet 40 mg  40 mg Oral BID    sodium chloride (NS) flush 5-40 mL  5-40 mL IntraVENous Q8H    sodium chloride (NS) flush 5-40 mL  5-40 mL IntraVENous PRN    bisacodyL (DULCOLAX) suppository 10 mg  10 mg Rectal DAILY PRN    promethazine (PHENERGAN) tablet 12.5 mg  12.5 mg Oral Q6H PRN    Or    ondansetron (ZOFRAN) injection 4 mg  4 mg IntraVENous Q6H PRN    nystatin (MYCOSTATIN) 100,000 unit/gram powder   Topical BID    gabapentin (NEURONTIN) capsule 300 mg  300 mg Oral DAILY    levothyroxine (SYNTHROID) tablet 75 mcg  75 mcg Oral ACB    pravastatin (PRAVACHOL) tablet 10 mg  10 mg Oral QHS    folic acid (FOLVITE) tablet 1 mg  1 mg Oral DAILY    escitalopram oxalate (LEXAPRO) tablet 20 mg  20 mg Oral DAILY    albuterol (PROVENTIL VENTOLIN) nebulizer solution 2.5 mg  2.5 mg Nebulization Q4H PRN    albumin human 25% (BUMINATE) solution 25 g  25 g IntraVENous Q6H       Prior to Admission medications    Medication Sig Start Date End Date Taking? Authorizing Provider   loratadine (Claritin) 10 mg tablet Take 10 mg by mouth daily. Yes Other, MD Evelyn   escitalopram oxalate (Lexapro) 20 mg tablet Take 20 mg by mouth daily. Yes Provider, Historical   folic acid (FOLVITE) 1 mg tablet Take 1 mg by mouth daily. Yes Provider, Historical   Omeprazole delayed release (PRILOSEC D/R) 20 mg tablet Take 20 mg by mouth daily.  Indications: gastroesophageal reflux disease, Medication Treatment for Healing Erosive Esophagitis   Yes Provider, Historical   levothyroxine (SYNTHROID) 50 mcg tablet Take 75 mcg by mouth Daily (before breakfast). Indications: a condition with low thyroid hormone levels   Yes Provider, Historical   gabapentin (NEURONTIN) 600 mg tablet Take 300 mg by mouth daily. Indications: neuropathic pain   Yes Provider, Historical   pravastatin (PRAVACHOL) 10 mg tablet Take 10 mg by mouth nightly. Indications: high cholesterol   Yes Provider, Historical   albuterol (PROVENTIL HFA, VENTOLIN HFA, PROAIR HFA) 90 mcg/actuation inhaler Take 1 Puff by inhalation every four (4) hours as needed for Wheezing. Indications: chronic bronchitis    Provider, Historical   diphenhydrAMINE (BENADRYL ALLERGY) 25 mg tablet Take 1 Tab by mouth every six (6) hours as needed for Itching. 8/18/17   Windy Gill MD   loperamide (IMMODIUM) 2 mg tablet Take 2 mg by mouth three (3) times daily as needed for Diarrhea. Provider, Historical   desoximetasone (TOPICORT) 0.05 % topical cream Apply  to affected area two (2) times daily as needed for Skin Irritation. Provider, Historical   loratadine-pseudoephedrine (CLARITIN-D 24 HOUR)  mg per tablet Take 1 Tab by mouth as needed. Provider, Historical       Allergies   Allergen Reactions    Latex Hives    Acetaminophen Other (comments)     Can not take- has advanced liver disease.     Adhesive Tape-Silicones Other (comments)     Skin burns    Aspirin Other (comments)     Dr. Andrea Salomon does not want pt to take it     Codeine Nausea and Vomiting and Other (comments)    Dilaudid [Hydromorphone] Nausea and Vomiting     Patient also complained of weakness    Doxycycline Hives and Nausea and Vomiting    Hydrocodone Nausea and Vomiting     Elevated blood pressure, effected her eyes, weakness    Oxycodone Itching and Nausea and Vomiting     Patient complained of vision disturbance    Penicillins Hives and Other (comments)     \" I go blind\"    Sulfa (Sulfonamide Antibiotics) Unknown (comments)       Physical Exam:    Temp (24hrs), Av.9 °F (36.6 °C), Min:97.6 °F (36.4 °C), Max:98.2 °F (36.8 °C)  VSIPNo intake or output data in the 24 hours ending 22 0829    General: Alert , Oriented, in no distress  HEENT: no pallor, anicteric sclera, oral pharynx without lesion   No cervical, supraclavicular, axillary and inguinal lymphadenopathy palpated  Heart: regular rate, and rhythm, without murmur, gallop or rubbing  Lungs:breathing comfortably on room air, clear to auscultation and percussion bilaterally  ABD: bowel sound present, soft, nondistended, nontender, no hepatosplenomegaly or mass  Extremities: warm, well perfused, no edema  MSK: no tenderness along the spine or long bones  Skin: No rash  Neuro: non-focal      Recent Results (from the past 24 hour(s))   CBC WITH AUTOMATED DIFF    Collection Time: 22  4:35 AM   Result Value Ref Range    WBC 2.5 (L) 4.6 - 13.2 K/uL    RBC 1.38 (L) 4.20 - 5.30 M/uL    HGB 4.8 (LL) 12.0 - 16.0 g/dL    HCT 15.2 (LL) 35.0 - 45.0 %    .1 (H) 78.0 - 100.0 FL    MCH 34.8 (H) 24.0 - 34.0 PG    MCHC 31.6 31.0 - 37.0 g/dL    RDW 17.6 (H) 11.6 - 14.5 %    PLATELET 41 (L) 915 - 420 K/uL    MPV 10.6 9.2 - 11.8 FL    NRBC 0.0 0  WBC    ABSOLUTE NRBC 0.00 0.00 - 0.01 K/uL    NEUTROPHILS 62 40 - 73 %    LYMPHOCYTES 21 21 - 52 %    MONOCYTES 12 (H) 3 - 10 %    EOSINOPHILS 5 0 - 5 %    BASOPHILS 0 0 - 2 %    IMMATURE GRANULOCYTES 0 0.0 - 0.5 %    ABS. NEUTROPHILS 1.6 (L) 1.8 - 8.0 K/UL    ABS. LYMPHOCYTES 0.5 (L) 0.9 - 3.6 K/UL    ABS. MONOCYTES 0.3 0.05 - 1.2 K/UL    ABS. EOSINOPHILS 0.1 0.0 - 0.4 K/UL    ABS. BASOPHILS 0.0 0.0 - 0.1 K/UL    ABS. IMM.  GRANS. 0.0 0.00 - 0.04 K/UL    DF AUTOMATED      PLATELET COMMENTS DECREASED PLATELETS      RBC COMMENTS MACROCYTOSIS  2+        RBC COMMENTS OVALOCYTES  2+        RBC COMMENTS POLYCHROMASIA  1+        RBC COMMENTS SPHEROCYTES  1+         Imagin/24/22 CT A/P:   IMPRESSION     1.  Cirrhosis of the liver with additional stigmata of portal hypertension to  include:     > Splenomegaly     > Gastroesophageal varices     > Splenorenal shunt     > Moderately large volume of intra-abdominal/pelvic ascites.     2. No abnormal bowel wall thickening or adenopathy.     3. Extensive body wall edema.     4. Age-indeterminate, favored chronic compression deformities of the T12, L2 and  L3 vertebral body superior endplates. > Grade 1 anterolisthesis L4 on L5 related to advanced facet arthrosis.

## 2022-01-25 NOTE — CONSULTS
[facility] hospitals, MD, [address], [place], [place]      JULISA Wong, ACNP-BC     Graciela Shrestha, [facility]-BC   AndreaLAVINIA Copelandlory Agueda, [facility]-BC       [address]erri Jul[address]    at [address], [facility] Joe Khan  22.    [phone]    FAX: [address] [place] [address]    at [address], [address]    [phone]    FAX: [phone]       HEPATOLOGY PROGRESS NOTE  The patient is well known to and regularly cared for at [place] [address]. She is a 68 y.o.  female who was found to have chronic liver disease in 2002 and cirrhosis in 2011. Serologic evaluation for markers of chronic liver disease was negative.     The patient has developed the following complications of cirrhosis: Gastric varices, ascites, edema,     She has recently developed worsening anasarca and SYLVIE. She has gained about 30 pounds of edema in her legs. She was hosptialized for treatment of edema andAKI. Hospital course:  She has been getting IV albumin at the usual dose  Edema is improveing but still a long way to go. No Scr to see if SYLVIE getting better  HB is stable in the 5 gm range. Hepatology Plan:  Continue IV albumin   Please get BMP every day  Can add diuretics once Scr is below 1.5 mg   Transfuse 1 unit of blood if acceptable with blood bank. I will do EGD in AM.  Keep NPO after MN tonight.       ASSESSMENT AND PLAN:  Cirrhosis  The diagnosis of cirrhosis is based upon imaging, laboratory studies, complications of cirrhosis. Cirrhosis is thought to be secondary to NAFLD.     The patient has normal liver function. The patient has never developed any complications of cirrhosis to date.      The CTP is 10. Child class C.   The MELD score is 22.     Have performed laboratory testing to monitor liver function and degree of liver injury. This included BMP, hepatic panel, CBC with platelet count, INR. Liver transaminases are normal.  ALP is normal.  Liver function is depressed. The platelet count is depressed.        Serologic testing for causes of chronic liver disease was negative      Elevation in Ferritin  There is an elevation in ferritin with normal iron saturation. 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 The elevation in ferritin is the result of the HFE gene mutation and does not reflect iron overload. Phlebotomy therapy is not indicated.     Ascites   Ascites developed for the first time in 7/2021. Ascites is refractory to current doses of diuretics because of kidney disease      She is not taking diuretics.     Paracentesis will be performed as needed.     The patient was counseled regarding the need to maintain sodium restriction and the types of foods containing high amounts of sodium to be avoided.     Lower extremity edema  Edema is refractory to the current doses of diuretics because of kidney disease   The patient is not taking diuretics.     Screening for Esophageal varices   The patient has gastric varices without prior bleeding. The patient is on a beta-blocker to reduce the risk of variceal hemorrhage. Gastric varices are at low risk for bleeding.     Beta-blockers can increase the risk of CKD. Will stop the nadolol as this may help CKD.     Hepatic encephalopathy   Overt HE has not developed to date. There is no reason for treatment with lactulose of xifaxan  There is no need to restrict dietary protein at this time.       Thrombocytopenia   This is secondary to cirrhosis. There is no evidence of overt bleeding. No treatment is required.   The platelet count is adequate for the patient to undergo procedures without the need for platelet transfusion or platelet growth factors.     Anemia   This is due to multifactorial causes including portal hypertension with chronic GI blood loss   The most recent FE studies were from 11/2021. The serum ferritin is 941  The FE saturation is 50     Screening for Hepatocellular Carcinoma  HCC screening has recently been performed and does not suggest [address] The next liver imaging study will be performed in 2/2022. PHYSICAL EXAMINATION:  VS: per nursing note  General:  No acute distress. Eyes:  Sclera anicteric. ENT:  No oral lesions. Thyroid normal.  Nodes:  No adenopathy. Skin:  No spider angiomata. No jaundice. Respiratory:  Lungs clear to auscultation. Cardiovascular:  Regular heart rate. Abdomen:  Soft non-tender, No obvious ascites. Extremities:  4+ lower extremity edema. Neurologic:  Alert and oriented. Cranial nerves grossly intact. No asterixis. LABORATORY:  Results for Tracy De La Garza" (MRN [id number]) as of 1/24/2022 07:20   Ref. Range 1/23/2022 13:44 1/24/2022 04:45   WBC Latest Ref Range: 4.6 - 13.2 K/uL 4.7 2.6 (L)   HGB Latest Ref Range: 12.0 - 16.0 g/dL 6.3 (L) 5.1 (LL)   PLATELET Latest Ref Range: 135 - 420 K/uL 64 (L) 48 (L)   Sodium Latest Ref Range: 136 - 145 mmol/L 138 139   Potassium Latest Ref Range: 3.5 - 5.5 mmol/L 4.1 3.8   Chloride Latest Ref Range: 100 - 111 mmol/L 107 108   CO2 Latest Ref Range: 21 - 32 mmol/L 24 27   Glucose Latest Ref Range: 74 - 99 mg/dL 101 (H) 89   BUN Latest Ref Range: 7.0 - 18 MG/DL 19 (H) 20 (H)   Creatinine Latest Ref Range: 0.6 - 1.3 MG/DL 2.06 (H) 2.04 (H)   Bilirubin, total Latest Ref Range: 0.2 - 1.0 MG/DL 3.0 (H) 2.6 (H)   Albumin Latest Ref Range: 3.4 - 5.0 g/dL 2.2 (L) 2.6 (L)   ALT Latest Ref Range: 13 - 56 U/L 16 12 (L)   AST Latest Ref Range: 10 - 38 U/L 44 (H) 22   Alk.  phosphatase Latest Ref Range: 45 - 117 U/L 94 70   Ammonia Latest Ref Range: 11 - 32 UMOL/L 57 (H)        RADIOLOGY:  CT scan abdomen without IV contrast.  Changes consistent with cirrhosis. No liver mass lesions. Moderate ascites. Spontaneous spleno-renal shunt. Anasarca.         MD Prakash RileyCarnegie [facility] [place], [place] [address] of [address], [address] [place] [address].  [phone]  [address] [place] [address]

## 2022-01-25 NOTE — ROUTINE PROCESS
Bedside and Verbal shift change report given to KLARISSA Brandt RN (oncoming nurse) by Mariah Marques RN (offgoing nurse). Report included the following information SBAR, Kardex, Intake/Output and MAR.

## 2022-01-26 ENCOUNTER — ANESTHESIA (OUTPATIENT)
Dept: ENDOSCOPY | Age: 77
DRG: 432 | End: 2022-01-26
Payer: MEDICARE

## 2022-01-26 LAB
ALBUMIN SERPL-MCNC: 4 G/DL (ref 3.4–5)
ALBUMIN/GLOB SERPL: 2.1 {RATIO} (ref 0.8–1.7)
ALP SERPL-CCNC: 52 U/L (ref 45–117)
ALT SERPL-CCNC: 11 U/L (ref 13–56)
ANION GAP SERPL CALC-SCNC: 8 MMOL/L (ref 3–18)
AST SERPL-CCNC: 15 U/L (ref 10–38)
BILIRUB SERPL-MCNC: 3.1 MG/DL (ref 0.2–1)
BUN SERPL-MCNC: 22 MG/DL (ref 7–18)
BUN/CREAT SERPL: 8 (ref 12–20)
CALCIUM SERPL-MCNC: 7.5 MG/DL (ref 8.5–10.1)
CHLORIDE SERPL-SCNC: 106 MMOL/L (ref 100–111)
CO2 SERPL-SCNC: 24 MMOL/L (ref 21–32)
CREAT SERPL-MCNC: 2.76 MG/DL (ref 0.6–1.3)
ERYTHROCYTE [DISTWIDTH] IN BLOOD BY AUTOMATED COUNT: 17.9 % (ref 11.6–14.5)
GLOBULIN SER CALC-MCNC: 1.9 G/DL (ref 2–4)
GLUCOSE SERPL-MCNC: 124 MG/DL (ref 74–99)
HCT VFR BLD AUTO: 15.3 % (ref 35–45)
HGB BLD-MCNC: 4.8 G/DL (ref 12–16)
MCH RBC QN AUTO: 34.8 PG (ref 24–34)
MCHC RBC AUTO-ENTMCNC: 31.4 G/DL (ref 31–37)
MCV RBC AUTO: 110.9 FL (ref 78–100)
NRBC # BLD: 0 K/UL (ref 0–0.01)
NRBC BLD-RTO: 0 PER 100 WBC
PLATELET # BLD AUTO: 35 K/UL (ref 135–420)
PMV BLD AUTO: 10.3 FL (ref 9.2–11.8)
POTASSIUM SERPL-SCNC: 3.7 MMOL/L (ref 3.5–5.5)
PROT SERPL-MCNC: 5.9 G/DL (ref 6.4–8.2)
RBC # BLD AUTO: 1.38 M/UL (ref 4.2–5.3)
SODIUM SERPL-SCNC: 138 MMOL/L (ref 136–145)
WBC # BLD AUTO: 3.9 K/UL (ref 4.6–13.2)

## 2022-01-26 PROCEDURE — 36415 COLL VENOUS BLD VENIPUNCTURE: CPT

## 2022-01-26 PROCEDURE — 65270000029 HC RM PRIVATE

## 2022-01-26 PROCEDURE — 77010033678 HC OXYGEN DAILY

## 2022-01-26 PROCEDURE — 76060000031 HC ANESTHESIA FIRST 0.5 HR: Performed by: INTERNAL MEDICINE

## 2022-01-26 PROCEDURE — 0DJ08ZZ INSPECTION OF UPPER INTESTINAL TRACT, VIA NATURAL OR ARTIFICIAL OPENING ENDOSCOPIC: ICD-10-PCS | Performed by: INTERNAL MEDICINE

## 2022-01-26 PROCEDURE — P9047 ALBUMIN (HUMAN), 25%, 50ML: HCPCS | Performed by: HOSPITALIST

## 2022-01-26 PROCEDURE — 74011000250 HC RX REV CODE- 250: Performed by: NURSE ANESTHETIST, CERTIFIED REGISTERED

## 2022-01-26 PROCEDURE — 85027 COMPLETE CBC AUTOMATED: CPT

## 2022-01-26 PROCEDURE — 74011250637 HC RX REV CODE- 250/637: Performed by: HOSPITALIST

## 2022-01-26 PROCEDURE — 80053 COMPREHEN METABOLIC PANEL: CPT

## 2022-01-26 PROCEDURE — 74011000250 HC RX REV CODE- 250: Performed by: HOSPITALIST

## 2022-01-26 PROCEDURE — 74011250636 HC RX REV CODE- 250/636: Performed by: HOSPITALIST

## 2022-01-26 PROCEDURE — 74011250636 HC RX REV CODE- 250/636: Performed by: NURSE ANESTHETIST, CERTIFIED REGISTERED

## 2022-01-26 PROCEDURE — 2709999900 HC NON-CHARGEABLE SUPPLY: Performed by: INTERNAL MEDICINE

## 2022-01-26 PROCEDURE — 76040000019: Performed by: INTERNAL MEDICINE

## 2022-01-26 PROCEDURE — 74011250636 HC RX REV CODE- 250/636: Performed by: INTERNAL MEDICINE

## 2022-01-26 PROCEDURE — 94640 AIRWAY INHALATION TREATMENT: CPT

## 2022-01-26 PROCEDURE — 43235 EGD DIAGNOSTIC BRUSH WASH: CPT | Performed by: INTERNAL MEDICINE

## 2022-01-26 PROCEDURE — 99233 SBSQ HOSP IP/OBS HIGH 50: CPT | Performed by: INTERNAL MEDICINE

## 2022-01-26 RX ORDER — FLUMAZENIL 0.1 MG/ML
0.2 INJECTION INTRAVENOUS
Status: CANCELLED | OUTPATIENT
Start: 2022-01-26 | End: 2022-01-26

## 2022-01-26 RX ORDER — KETAMINE HYDROCHLORIDE 10 MG/ML
INJECTION, SOLUTION INTRAMUSCULAR; INTRAVENOUS AS NEEDED
Status: DISCONTINUED | OUTPATIENT
Start: 2022-01-26 | End: 2022-01-26 | Stop reason: HOSPADM

## 2022-01-26 RX ORDER — SODIUM CHLORIDE 9 MG/ML
150 INJECTION, SOLUTION INTRAVENOUS CONTINUOUS
Status: DISCONTINUED | OUTPATIENT
Start: 2022-01-26 | End: 2022-01-26 | Stop reason: HOSPADM

## 2022-01-26 RX ORDER — SODIUM CHLORIDE 0.9 % (FLUSH) 0.9 %
5-40 SYRINGE (ML) INJECTION EVERY 8 HOURS
Status: CANCELLED | OUTPATIENT
Start: 2022-01-26

## 2022-01-26 RX ORDER — EPINEPHRINE 0.1 MG/ML
1 INJECTION INTRACARDIAC; INTRAVENOUS
Status: CANCELLED | OUTPATIENT
Start: 2022-01-26 | End: 2022-01-27

## 2022-01-26 RX ORDER — ATROPINE SULFATE 0.1 MG/ML
0.5 INJECTION INTRAVENOUS
Status: CANCELLED | OUTPATIENT
Start: 2022-01-26 | End: 2022-01-27

## 2022-01-26 RX ORDER — DEXTROMETHORPHAN/PSEUDOEPHED 2.5-7.5/.8
1.2 DROPS ORAL
Status: CANCELLED | OUTPATIENT
Start: 2022-01-26

## 2022-01-26 RX ORDER — PROPOFOL 10 MG/ML
INJECTION, EMULSION INTRAVENOUS AS NEEDED
Status: DISCONTINUED | OUTPATIENT
Start: 2022-01-26 | End: 2022-01-26 | Stop reason: HOSPADM

## 2022-01-26 RX ORDER — ALBUTEROL SULFATE 0.83 MG/ML
2.5 SOLUTION RESPIRATORY (INHALATION)
Status: DISCONTINUED | OUTPATIENT
Start: 2022-01-27 | End: 2022-01-28 | Stop reason: HOSPADM

## 2022-01-26 RX ORDER — SODIUM CHLORIDE 9 MG/ML
100 INJECTION, SOLUTION INTRAVENOUS CONTINUOUS
Status: CANCELLED | OUTPATIENT
Start: 2022-01-26 | End: 2022-01-26

## 2022-01-26 RX ORDER — IPRATROPIUM BROMIDE AND ALBUTEROL SULFATE 2.5; .5 MG/3ML; MG/3ML
3 SOLUTION RESPIRATORY (INHALATION)
Status: DISCONTINUED | OUTPATIENT
Start: 2022-01-26 | End: 2022-01-28 | Stop reason: HOSPADM

## 2022-01-26 RX ORDER — SODIUM CHLORIDE 0.9 % (FLUSH) 0.9 %
5-40 SYRINGE (ML) INJECTION AS NEEDED
Status: CANCELLED | OUTPATIENT
Start: 2022-01-26

## 2022-01-26 RX ORDER — LIDOCAINE HYDROCHLORIDE 20 MG/ML
INJECTION, SOLUTION EPIDURAL; INFILTRATION; INTRACAUDAL; PERINEURAL AS NEEDED
Status: DISCONTINUED | OUTPATIENT
Start: 2022-01-26 | End: 2022-01-26 | Stop reason: HOSPADM

## 2022-01-26 RX ORDER — NALOXONE HYDROCHLORIDE 0.4 MG/ML
0.4 INJECTION, SOLUTION INTRAMUSCULAR; INTRAVENOUS; SUBCUTANEOUS
Status: CANCELLED | OUTPATIENT
Start: 2022-01-26 | End: 2022-01-26

## 2022-01-26 RX ADMIN — GABAPENTIN 300 MG: 300 CAPSULE ORAL at 09:20

## 2022-01-26 RX ADMIN — LIDOCAINE HYDROCHLORIDE 40 MG: 20 INJECTION, SOLUTION EPIDURAL; INFILTRATION; INTRACAUDAL; PERINEURAL at 07:59

## 2022-01-26 RX ADMIN — PRAVASTATIN SODIUM 10 MG: 20 TABLET ORAL at 21:00

## 2022-01-26 RX ADMIN — ALBUMIN (HUMAN) 25 G: 0.25 INJECTION, SOLUTION INTRAVENOUS at 05:34

## 2022-01-26 RX ADMIN — NYSTATIN: 100000 POWDER TOPICAL at 20:59

## 2022-01-26 RX ADMIN — LEVOTHYROXINE SODIUM 75 MCG: 0.07 TABLET ORAL at 06:41

## 2022-01-26 RX ADMIN — ALBUMIN (HUMAN) 25 G: 0.25 INJECTION, SOLUTION INTRAVENOUS at 00:33

## 2022-01-26 RX ADMIN — SODIUM CHLORIDE, PRESERVATIVE FREE 10 ML: 5 INJECTION INTRAVENOUS at 21:00

## 2022-01-26 RX ADMIN — ALBUMIN (HUMAN) 25 G: 0.25 INJECTION, SOLUTION INTRAVENOUS at 17:24

## 2022-01-26 RX ADMIN — ALBUMIN (HUMAN) 25 G: 0.25 INJECTION, SOLUTION INTRAVENOUS at 23:00

## 2022-01-26 RX ADMIN — SODIUM CHLORIDE, PRESERVATIVE FREE 10 ML: 5 INJECTION INTRAVENOUS at 14:00

## 2022-01-26 RX ADMIN — ESCITALOPRAM OXALATE 20 MG: 10 TABLET ORAL at 09:20

## 2022-01-26 RX ADMIN — ALBUMIN (HUMAN) 25 G: 0.25 INJECTION, SOLUTION INTRAVENOUS at 12:22

## 2022-01-26 RX ADMIN — PANTOPRAZOLE SODIUM 40 MG: 40 TABLET, DELAYED RELEASE ORAL at 09:21

## 2022-01-26 RX ADMIN — SODIUM CHLORIDE: 900 INJECTION, SOLUTION INTRAVENOUS at 07:44

## 2022-01-26 RX ADMIN — ALBUTEROL SULFATE 2.5 MG: 2.5 SOLUTION RESPIRATORY (INHALATION) at 09:55

## 2022-01-26 RX ADMIN — SODIUM CHLORIDE, PRESERVATIVE FREE 10 ML: 5 INJECTION INTRAVENOUS at 06:41

## 2022-01-26 RX ADMIN — ALBUTEROL SULFATE 2.5 MG: 2.5 SOLUTION RESPIRATORY (INHALATION) at 19:43

## 2022-01-26 RX ADMIN — PROPOFOL 10 MG: 10 INJECTION, EMULSION INTRAVENOUS at 08:03

## 2022-01-26 RX ADMIN — FOLIC ACID 1 MG: 1 TABLET ORAL at 09:21

## 2022-01-26 RX ADMIN — KETAMINE HYDROCHLORIDE 10 MG: 10 INJECTION, SOLUTION INTRAMUSCULAR; INTRAVENOUS at 07:59

## 2022-01-26 RX ADMIN — PROPOFOL 50 MG: 10 INJECTION, EMULSION INTRAVENOUS at 08:00

## 2022-01-26 RX ADMIN — NYSTATIN: 100000 POWDER TOPICAL at 09:18

## 2022-01-26 RX ADMIN — ALBUTEROL SULFATE 2.5 MG: 2.5 SOLUTION RESPIRATORY (INHALATION) at 14:25

## 2022-01-26 RX ADMIN — PANTOPRAZOLE SODIUM 40 MG: 40 TABLET, DELAYED RELEASE ORAL at 20:59

## 2022-01-26 NOTE — PERIOP NOTES
0741 am- pt  Is AOx4 . Able to sign EGD consent. Doctor Sara Khan at bedside and explained the procedure. Pt declined blood  Transfusion- signed refusal.  808 am-  Consent & pictures are in the chart./pt's dentures at bedside.

## 2022-01-26 NOTE — PROGRESS NOTES
Notified resident continues to have critical lab values hgb 4.8 and hematocrit 15.3. Patient refuses blood products at this time and MD is aware. Patient continues to work with  and MD to go home with home health or hospice. No further concerns at this time.

## 2022-01-26 NOTE — PROGRESS NOTES
[address] [facility], Dajuan BURNETTE, Rachel Krystenromelia [place], [place]      JULSIA Wong, [facility]-BC     Graciela Shrestha, [facility]BC   LAVINIA Brannon, [facility]       Sowmyajerri Julio Judd [address]    at [address], [facility] Joe Khan  22.    [phone]    FAX: [address] [place] [address] Avenue    at [address], [address]    [phone]    FAX: [phone]       HEPATOLOGY PROGRESS NOTE  The patient is well known to and regularly cared for at [place] [address]. She is a 68 y.o.  female who was found to have chronic liver disease in 2002 and cirrhosis in 2011. Serologic evaluation for markers of chronic liver disease was negative.     The patient has developed the following complications of cirrhosis: Gastric varices, ascites, edema,     She has recently developed worsening anasarca and SYLVIE. She has gained about 30 pounds of edema in her legs. She was hosptialized for treatment of edema andAKI. Hospital course:  She has been getting IV albumin at the usual dose  Edema continues to improve. Unfortunately, Scr has increased to 2.76 mg  S albumin up to 4.0    HB has drifted down to 4.8 gms. She does not want any blood transfusions. Ferritin is 330 and FE saturation 63%    EGD this AM showed portal gastropathy and gastric varices. No signs of bleeding. Hepatology Plan:  Continue IV albumin for another 1-2 days as long as legs continue to improve. Please get BMP every day to monitor Scr  With rise in Scr cannot add diuretics back in. Check for infection to explain the rise in SCr.   Ucx Blood Cx       ASSESSMENT AND PLAN:  Cirrhosis  The diagnosis of cirrhosis is based upon imaging, laboratory studies, complications of cirrhosis. Cirrhosis is thought to be secondary to NAFLD.     The patient has normal liver function. The patient has never developed any complications of cirrhosis to date.      The CTP is 10. Child class C. The rise in Scr has increased the MELD score from 22 to 30.      Ascites   Ascites developed for the first time in 7/2021. Ascites is refractory to current doses of diuretics because of kidney disease   She is not taking diuretics because of CKD-HRS  Ascites is being treated with IV albumin     Lower extremity edema  Edema is refractory to the current doses of diuretics because of kidney disease   The patient is no longer taking diuretics because of CKD-HRS  Edema is greatly improved with IV albumin. Serum albumin in now 4 gms. Can continue for another 1-2 days    Acute Kidney Injury  Scr has increased from 2.0 gm on admission to 2.7 mg. This has occurred despite IV albumin which typically lowers serum albumin. 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 May now have superimposed SYLVIE-HRS. BP is excellent so this not due to hypotension. May be due to severe anemia which may be significantly helped by 1 unit blood. If she agrees to blood I would stop IV albumin for the day. Need to check for infections. UA. Urine culture. Blood culture. Check ECHO.     Screening for Esophageal varices   The patient has gastric varices without prior bleeding. Gastric varices are at low risk for bleeding. Repeat EGD today demonstrated the same. Mild portal gastropathy and benign appearing gstic varices with no sign of blood loss.      Hepatic encephalopathy   Overt HE has not developed to date. There is no reason for treatment with lactulose of xifaxan  There is no need to restrict dietary protein at this time.       Thrombocytopenia   This is secondary to cirrhosis. There is no evidence of overt bleeding. No treatment is required.   The platelet count is adequate for the patient to undergo procedures without the need for platelet transfusion or platelet growth factors.     Anemia   This is due to multifactorial causes including portal hypertension with chronic GI blood loss   Repeat FE studies show: The serum ferritin is down from 941 to 330  The FE saturation is up from 50 to 63%    She typically runs high ferritin due to HFE carrier state H63D  The decline in Ferrtin from 900 to 300 range is therefore significant and strongly suggests she has FE deficiency  The elevation in FE saturation is due to advanced liver disease and very low serum transferin     Will give 3 doses of IV FE and see if that helps the HB. .    End-of-life care  The patient has advanced cirrhosis and is very frail. Scr is going the wrong way. If this continues to rise it is an ominous sign. Make sure DNR status has been discussed with daughter. If Scr does not turn around I would start to discuss hospice care. I was not able to discuss this with daughter yesterday because Scr had not been drawn for 2 days and today's result did not come back until later in day. PHYSICAL EXAMINATION:  VS: per nursing note  General:  No acute distress. Eyes:  Sclera anicteric. ENT:  No oral lesions. Thyroid normal.  Nodes:  No adenopathy. Skin:  No spider angiomata. No jaundice. Respiratory:  Lungs clear to auscultation. Cardiovascular:  Regular heart rate. Abdomen:  Soft non-tender, No obvious ascites. Extremities:  4+ lower extremity edema. Neurologic:  Alert and oriented. Cranial nerves grossly intact. No asterixis. LABORATORY:  Results for Livan Salazar" (MRN [id number]) as of 1/27/2022 03:38   Ref.  Range 1/23/2022 13:44 1/24/2022 04:45 1/25/2022 04:35 1/26/2022 14:10   WBC Latest Ref Range: 4.6 - 13.2 K/uL 4.7 2.6 (L) 2.5 (L) 3.9 (L)   HGB Latest Ref Range: 12.0 - 16.0 g/dL 6.3 (L) 5.1 (LL) 4.8 (LL) 4.8 (LL)   PLATELET Latest Ref Range: 135 - 420 K/uL 64 (L) 48 (L) 41 (L) 35 (L)   INR Latest Ref Range: 0.8 - 1.2     2.3 (H)    Sodium Latest Ref Range: 136 - 145 mmol/L 138 139  138   Potassium Latest Ref Range: 3.5 - 5.5 mmol/L 4.1 3.8  3.7   Chloride Latest Ref Range: 100 - 111 mmol/L 107 108  106   CO2 Latest Ref Range: 21 - 32 mmol/L 24 27  24   Glucose Latest Ref Range: 74 - 99 mg/dL 101 (H) 89  124 (H)   BUN Latest Ref Range: 7.0 - 18 MG/DL 19 (H) 20 (H)  22 (H)   Creatinine Latest Ref Range: 0.6 - 1.3 MG/DL 2.06 (H) 2.04 (H)  2.76 (H)   Bilirubin, total Latest Ref Range: 0.2 - 1.0 MG/DL 3.0 (H) 2.6 (H)  3.1 (H)   Albumin Latest Ref Range: 3.4 - 5.0 g/dL 2.2 (L) 2.6 (L)  4.0   ALT Latest Ref Range: 13 - 56 U/L 16 12 (L)  11 (L)   AST Latest Ref Range: 10 - 38 U/L 44 (H) 22  15   Alk. phosphatase Latest Ref Range: 45 - 117 U/L 94 70  52   Ammonia Latest Ref Range: 11 - 32 UMOL/L 57 (H)            RADIOLOGY:  CT scan abdomen without IV contrast.  Changes consistent with cirrhosis. No liver mass lesions. Moderate ascites. Spontaneous spleno-renal shunt. Anasarca.         MD Marge Rogers [address], [address] [place] [address].  [phone] [address]

## 2022-01-26 NOTE — ANESTHESIA PREPROCEDURE EVALUATION
Relevant Problems   CARDIOVASCULAR   (+) Hypertension      GASTROINTESTINAL   (+) Cirrhosis (HCC)   (+) GERD (gastroesophageal reflux disease)      RENAL FAILURE   (+) Stage 3 chronic kidney disease (HCC)      ENDOCRINE   (+) Hypothyroidism      HEMATOLOGY   (+) Severe anemia       Anesthetic History   No history of anesthetic complications            Review of Systems / Medical History  Patient summary reviewed, nursing notes reviewed and pertinent labs reviewed    Pulmonary          Shortness of breath         Neuro/Psych         Dementia     Cardiovascular    Hypertension              Exercise tolerance: <4 METS     GI/Hepatic/Renal     GERD    Renal disease: CRI  Liver disease     Endo/Other      Hypothyroidism       Other Findings   Comments: Low platelets         Physical Exam    Airway  Mallampati: IV  TM Distance: 4 - 6 cm  Neck ROM: normal range of motion, short neck   Mouth opening: Diminished (comment)     Cardiovascular  Regular rate and rhythm,  S1 and S2 normal,  no murmur, click, rub, or gallop             Dental    Dentition: Full upper dentures and Full lower dentures     Pulmonary  Breath sounds clear to auscultation               Abdominal  GI exam deferred       Other Findings            Anesthetic Plan    ASA: 4  Anesthesia type: MAC          Induction: Intravenous  Anesthetic plan and risks discussed with: Patient

## 2022-01-26 NOTE — PROGRESS NOTES
Attempt for OT treatment session. Noted pt Hgb 4. 8. will hold OT session at this time. To be followed later as pt schedule permits.      Lazara Arroyo OTR/L

## 2022-01-26 NOTE — PROGRESS NOTES
2013-alert and oriented x 4. Lungs with wheezes, diminished, on RA. Up to bathroom with walker and one assist. BS active x 4 quads. IV access to right FA flushed without difficulty. Denies pain at this time. RT called for breathing treatment when patient returned from bathroom and was back in bed.    78029-Incvtqw lab draw. Shift summary-No changes to health status. Patient refused to have labs drawn.

## 2022-01-26 NOTE — ANESTHESIA POSTPROCEDURE EVALUATION
Procedure(s):  ESOPHAGOGASTRODUODENOSCOPY (EGD).     MAC    Anesthesia Post Evaluation      Multimodal analgesia: multimodal analgesia used between 6 hours prior to anesthesia start to PACU discharge  Patient location during evaluation: PACU  Patient participation: complete - patient participated  Pain management: adequate  Airway patency: patent  Anesthetic complications: no  Cardiovascular status: acceptable  Respiratory status: acceptable  Hydration status: acceptable  Post anesthesia nausea and vomiting:  none  Final Post Anesthesia Temperature Assessment:  Normothermia (36.0-37.5 degrees C)      INITIAL Post-op Vital signs:   Vitals Value Taken Time   /46 01/26/22 1143   Temp 36.8 °C (98.2 °F) 01/26/22 1143   Pulse 87 01/26/22 1143   Resp 20 01/26/22 1143   SpO2 100 % 01/26/22 1143

## 2022-01-26 NOTE — ROUTINE PROCESS
Bedside and Verbal shift change report given to KLARISSA Ledesma RN (oncoming nurse) by Moraima Munroe RN (offgoing nurse). Report included the following information SBAR, Kardex, Intake/Output and MAR.

## 2022-01-26 NOTE — PERIOP NOTES
Report called to [facility], RN. Report of procedure, medications and diagnoses.  Patient currently in ENDO phase 2 recovery, patient to be transported via bed back to room 313

## 2022-01-26 NOTE — ROUTINE PROCESS
Bedside and Verbal shift change report given to KEMI Tatum RN (oncoming nurse) by KLARISSA Clemens RN (offgoing nurse). Report included the following information SBAR, Kardex, Intake/Output and MAR.

## 2022-01-26 NOTE — PERIOP NOTES
Report called to floor nurse by Minda Lopez RN. Pt on 2 l/min O2 weened from nonrebreather. Pt tolerated well. She has some inspiratory wheezing and anesthesia stated she arrived in that condition. Fluids turned off upon arrival to post op. Transport took patient to room in stable condition.

## 2022-01-26 NOTE — PROGRESS NOTES
Problem: Falls - Risk of  Goal: *Absence of Falls  Description: Document Chel Agrawal Fall Risk and appropriate interventions in the flowsheet.   Outcome: Progressing Towards Goal  Note: Fall Risk Interventions:  Mobility Interventions: Patient to call before getting OOB,Utilize walker, cane, or other assistive device    Mentation Interventions: Adequate sleep, hydration, pain control    Medication Interventions: Teach patient to arise slowly    Elimination Interventions: Patient to call for help with toileting needs              Problem: General Medical Care Plan  Goal: *Vital signs within specified parameters  Outcome: Progressing Towards Goal  Goal: *Absence of infection signs and symptoms  Outcome: Progressing Towards Goal  Goal: *Optimal pain control at patient's stated goal  Outcome: Progressing Towards Goal  Goal: *Skin integrity maintained  Outcome: Progressing Towards Goal

## 2022-01-26 NOTE — PROGRESS NOTES
1149: Pt back from Dr MONICA in room speaking with family. Pt Hgb is 4.8, refusing blood transfusion. Will follow up as schedule permits.

## 2022-01-26 NOTE — PROCEDURES
[address] [facility], MD, FACP, [facility] [place]      Maciia JULISA Dove, ACNP-BC     Graciela Shrestha, [facility]   LAVINIA Eubanks, [facility]       Sowmya Julio [facility] 136    at [address], [facility] Jeo Khan  22.    [phone]    FAX: [address] [place] [address], [address]    [phone]    FAX: [phone]       UPPER ENDOSCOPY PROCEDURE NOTE    Bethany Stroud  1945    INDICATION: Cirrhosis. Screening for esophageal varices with variceal ligation if  indicated. : Hermann Ibrahim MD    SURGICAL ASSISTANT:  None    PROSTHETIC DEVISES, TISSUE GRAFTS, ORGAN TRANSPLANTS:  Not applicable     ANESTHESIA/SEDATION: MAC Sedation per anesthesiology    PROCEDURE DESCRIPTION:  [employer] consent was obtained from the patient for the procedure. All risks and benefits of the procedure explained. The procedure was performed in the endoscopy suite. The patient was laying on a stretcher and moved to the left lateral decubitus position prior to administration of sedation. Sedation was administered by anesthesiology. See their note for details. The endoscope was inserted into the mouth and advanced under direct vision to the second portion of the duodenum. Careful inspection of upper gastrointestinal tract was made as the endoscope was inserted and withdrawn. Retroflexion of the endoscope to view of the cardia of the stomach was performed. The findings and interventions are described below. FINDINGS:  Esophagus:    Normal.      Stomach:   Mild portal hypertensive gastropathy of the body of the stomach  Gastric varices identified in cardia. No evidence of bleeding.     Duodenum: Normal bulb and second portion    SPECIMENS COLLECTED:   None    INTERVENTIONS:  None    COMPLICATIONS: None. The patient tolerated the procedure well. EBL: Negligible. RECOMMENDATIONS:  Observe until discharge parameters are achieved. May resume previous diet. Repeat endoscopy to reassess varices and need for banding in 2 years. Follow-up [facility] office as scheduled.       Erika Middleton MD  [address]  4 [facility], [address] [place] [address], [address] [place] [address] Street - [address]  12 [facility]

## 2022-01-27 LAB
ANION GAP SERPL CALC-SCNC: 9 MMOL/L (ref 3–18)
BUN SERPL-MCNC: 23 MG/DL (ref 7–18)
BUN/CREAT SERPL: 7 (ref 12–20)
CALCIUM SERPL-MCNC: 7.8 MG/DL (ref 8.5–10.1)
CHLORIDE SERPL-SCNC: 105 MMOL/L (ref 100–111)
CO2 SERPL-SCNC: 22 MMOL/L (ref 21–32)
CREAT SERPL-MCNC: 3.2 MG/DL (ref 0.6–1.3)
EPO SERPL-ACNC: 56 MIU/ML (ref 2.6–18.5)
GLUCOSE SERPL-MCNC: 104 MG/DL (ref 74–99)
POTASSIUM SERPL-SCNC: 3.9 MMOL/L (ref 3.5–5.5)
SODIUM SERPL-SCNC: 136 MMOL/L (ref 136–145)

## 2022-01-27 PROCEDURE — 94640 AIRWAY INHALATION TREATMENT: CPT

## 2022-01-27 PROCEDURE — 65270000029 HC RM PRIVATE

## 2022-01-27 PROCEDURE — 97116 GAIT TRAINING THERAPY: CPT

## 2022-01-27 PROCEDURE — 99233 SBSQ HOSP IP/OBS HIGH 50: CPT | Performed by: NURSE PRACTITIONER

## 2022-01-27 PROCEDURE — 97530 THERAPEUTIC ACTIVITIES: CPT

## 2022-01-27 PROCEDURE — P9047 ALBUMIN (HUMAN), 25%, 50ML: HCPCS | Performed by: HOSPITALIST

## 2022-01-27 PROCEDURE — 36415 COLL VENOUS BLD VENIPUNCTURE: CPT

## 2022-01-27 PROCEDURE — 74011250637 HC RX REV CODE- 250/637: Performed by: HOSPITALIST

## 2022-01-27 PROCEDURE — 74011000250 HC RX REV CODE- 250: Performed by: HOSPITALIST

## 2022-01-27 PROCEDURE — 74011250636 HC RX REV CODE- 250/636: Performed by: HOSPITALIST

## 2022-01-27 PROCEDURE — 77010033678 HC OXYGEN DAILY

## 2022-01-27 PROCEDURE — 80048 BASIC METABOLIC PNL TOTAL CA: CPT

## 2022-01-27 PROCEDURE — 74011000250 HC RX REV CODE- 250: Performed by: FAMILY MEDICINE

## 2022-01-27 RX ORDER — ARFORMOTEROL TARTRATE 15 UG/2ML
15 SOLUTION RESPIRATORY (INHALATION)
Status: DISCONTINUED | OUTPATIENT
Start: 2022-01-27 | End: 2022-01-28 | Stop reason: HOSPADM

## 2022-01-27 RX ORDER — BUDESONIDE 0.5 MG/2ML
500 INHALANT ORAL
Status: DISCONTINUED | OUTPATIENT
Start: 2022-01-27 | End: 2022-01-28 | Stop reason: HOSPADM

## 2022-01-27 RX ADMIN — ARFORMOTEROL TARTRATE 15 MCG: 15 SOLUTION RESPIRATORY (INHALATION) at 19:59

## 2022-01-27 RX ADMIN — BUDESONIDE 500 MCG: 0.5 INHALANT RESPIRATORY (INHALATION) at 07:27

## 2022-01-27 RX ADMIN — ALBUTEROL SULFATE 2.5 MG: 2.5 SOLUTION RESPIRATORY (INHALATION) at 11:19

## 2022-01-27 RX ADMIN — ALBUTEROL SULFATE 2.5 MG: 2.5 SOLUTION RESPIRATORY (INHALATION) at 07:27

## 2022-01-27 RX ADMIN — SODIUM CHLORIDE, PRESERVATIVE FREE 10 ML: 5 INJECTION INTRAVENOUS at 22:31

## 2022-01-27 RX ADMIN — PRAVASTATIN SODIUM 10 MG: 20 TABLET ORAL at 21:34

## 2022-01-27 RX ADMIN — BUDESONIDE 500 MCG: 0.5 INHALANT RESPIRATORY (INHALATION) at 19:59

## 2022-01-27 RX ADMIN — NYSTATIN: 100000 POWDER TOPICAL at 08:45

## 2022-01-27 RX ADMIN — SODIUM CHLORIDE, PRESERVATIVE FREE 10 ML: 5 INJECTION INTRAVENOUS at 05:59

## 2022-01-27 RX ADMIN — IPRATROPIUM BROMIDE AND ALBUTEROL SULFATE 3 ML: .5; 3 SOLUTION RESPIRATORY (INHALATION) at 01:12

## 2022-01-27 RX ADMIN — IPRATROPIUM BROMIDE AND ALBUTEROL SULFATE 3 ML: .5; 3 SOLUTION RESPIRATORY (INHALATION) at 10:55

## 2022-01-27 RX ADMIN — GABAPENTIN 300 MG: 300 CAPSULE ORAL at 08:44

## 2022-01-27 RX ADMIN — ALBUMIN (HUMAN) 25 G: 0.25 INJECTION, SOLUTION INTRAVENOUS at 05:59

## 2022-01-27 RX ADMIN — ALBUMIN (HUMAN) 25 G: 0.25 INJECTION, SOLUTION INTRAVENOUS at 18:11

## 2022-01-27 RX ADMIN — FOLIC ACID 1 MG: 1 TABLET ORAL at 08:45

## 2022-01-27 RX ADMIN — NYSTATIN: 100000 POWDER TOPICAL at 21:34

## 2022-01-27 RX ADMIN — ALBUTEROL SULFATE 2.5 MG: 2.5 SOLUTION RESPIRATORY (INHALATION) at 15:28

## 2022-01-27 RX ADMIN — LEVOTHYROXINE SODIUM 75 MCG: 0.07 TABLET ORAL at 06:35

## 2022-01-27 RX ADMIN — SODIUM CHLORIDE, PRESERVATIVE FREE 10 ML: 5 INJECTION INTRAVENOUS at 14:00

## 2022-01-27 RX ADMIN — ALBUTEROL SULFATE 2.5 MG: 2.5 SOLUTION RESPIRATORY (INHALATION) at 19:59

## 2022-01-27 RX ADMIN — ALBUMIN (HUMAN) 25 G: 0.25 INJECTION, SOLUTION INTRAVENOUS at 11:56

## 2022-01-27 RX ADMIN — PANTOPRAZOLE SODIUM 40 MG: 40 TABLET, DELAYED RELEASE ORAL at 08:43

## 2022-01-27 RX ADMIN — ARFORMOTEROL TARTRATE 15 MCG: 15 SOLUTION RESPIRATORY (INHALATION) at 07:27

## 2022-01-27 RX ADMIN — ESCITALOPRAM OXALATE 20 MG: 10 TABLET ORAL at 08:45

## 2022-01-27 RX ADMIN — PANTOPRAZOLE SODIUM 40 MG: 40 TABLET, DELAYED RELEASE ORAL at 21:34

## 2022-01-27 NOTE — RT PROTOCOL NOTE
RT Nebulizer Bronchodilator Protocol Note    There is a bronchodilator order in the chart from a provider indicating to follow the RT Bronchodilator Protocol and there is an Initiate RT Bronchodilator Protocol order as well (see protocol at bottom of note). CXR Findings:  No results found for this or any previous visit from the past 2 days. The findings from the last RT Protocol Assessment were as follows:  History of Pulmonary Disease: Chronic pulmonary disease  Respiratory Pattern: Mild dyspnea at rest, irregular pattern, or RR 21-25 bpm  Breath Sounds: Intermittent or unilateral wheezes  Cough: Strong, productive  Indication for Bronchodilator Therapy: Wheezing associated with pulmonary disorder  Bronchodilator Assessment Score: 11     Aerosolized bronchodilator medication orders have been revised according to the RT Nebulizer Bronchodilator Protocol below. Respiratory Therapist to perform RT Therapy Protocol Assessment initially then follow the protocol. Repeat RT Therapy Protocol Assessment PRN for score 0-3 or on second treatment, BID, and PRN for scores above 3. No Indications  adjust the frequency to every 6 hours PRN wheezing or bronchospasm, if no treatments needed after 48 hours then discontinue using Per Protocol order mode. If indication present, adjust the RT bronchodilator orders based on the Bronchodilator Assessment Score as indicated below. If a patient is on this medication at home then do not decrease Frequency below that used at home. 0-3  enter or revise RT bronchodilator order(s) to equivalent RT Bronchodilator order with Frequency of every 4 hours PRN for wheezing or increased work of breathing using Per Protocol order mode.         4-6  enter or revise RT Bronchodilator order(s) to two equivalent RT bronchodilator orders with one order with BID Frequency and one order with Frequency of every 4 hours PRN wheezing or increased work of breathing using Per Protocol order mode. 7-10  enter or revise RT Bronchodilator order(s) to two equivalent RT bronchodilator orders with one order with TID Frequency and one order with Frequency of every 4 hours PRN wheezing or increased work of breathing using Per Protocol order mode. 11-13  enter or revise RT Bronchodilator order(s) to one equivalent RT bronchodilator order with QID Frequency and an Albuterol order with Frequency of every 4 hours PRN wheezing or increased work of breathing using Per Protocol order mode. Greater than 13  enter or revise RT Bronchodilator order(s) to one equivalent RT bronchodilator order with every 4 hours Frequency and an Albuterol order with Frequency of every 2 hours PRN wheezing or increased work of breathing using Per Protocol order mode. RT to enter RT Home Evaluation for COPD & MDI Assessment order using Per Protocol order mode.     Electronically signed by Dash Menon on 1/26/2022 at 7:54 PM

## 2022-01-27 NOTE — HOSPICE
Per chart review, family has selected [facility] to provide hospice services[facility] Hospice referral received. Chart review in process. Thank you for the referral to [facility].  If we can be of further assistance please contact [address] [place], RN  Clinical Manager  [place] [address]., [facility], [address] [place] [address] Str.  [phone]  Email: [email or web address]

## 2022-01-27 NOTE — CONSULTS
Phone: [phone]  Paging : [phone]      Hematology / Oncology Initial Consult Note    Admit Date: 2022    Reason for Consult: Anemia    Requesting Physician: Dr. Julio Rojas    Assessment:     Lu Lopez is a 68 y.o., WHITE/NON-, female, who I have been asked to see for anemia. 1. Macrocytic Anemia: Hg 4.8, . Symptomatic with fatigue and SOB. It is multifactorial from thyroid disease, CKD, and liver disease given splenomegaly. Vitamin B12 and folate within normal. Ferritin is elevated. Denies any bleeding. Stool occult neg. CT A/P with no hematoma. TSH is elevated suggesting her hypothyroid may not be controlled. Reticulocyte count is appropriately elevated for level of anemia. Haptoglobin is low, but this is likely from liver disease as there is no e/o hemolysis on smear. She does not want blood transfusions. Will try to minimize blood draws. 2. Thrombocytopenia: Plt 35. Likely due to splenomegaly vs possible ITP. No e/o bleed. Will monitor    3. CKD Stage 3: Epogen level appropriately elevated due to anemia. As is >50mIU/mL, would not benefit from Epo injection at this time. 4. Cirrhosis with Anasarca: receiving Albumin under care of Hepatology    5. Leukopenia: no blasts, immature, or pelger- huet cells to suggest malignancy. Flow cyt pending. Plan:     - No evidence of bleeding.  Vit B12, folate, Iron, and Epo levels optimal  - Minimize blood draws  - Follow up flow cytometry  - Will continue to follow    Jamaal Roberson MD  [facility] [phone]      Subjective:     No acute overnight events  This am, reports feeling better, less swelling  Still tired with some SOB  Denies any signs of bleeding      Physical Exam:    Temp (24hrs), Av.4 °F (36.9 °C), Min:98.2 °F (36.8 °C), Max:98.7 °F (37.1 °C)  VSIP    Intake/Output Summary (Last 24 hours) at 2022 2241  Last data filed at 2022 2107  Gross per 24 hour   Intake 240 ml Output    Net 240 ml       General: Alert , Oriented, in no distress  HEENT: no pallor, anicteric sclera, oral pharynx without lesion   No cervical, supraclavicular, axillary and inguinal lymphadenopathy palpated  Heart: regular rate, and rhythm, without murmur, gallop or rubbing  Lungs:breathing comfortably on room air, clear to auscultation and percussion bilaterally  ABD: bowel sound present, soft, nondistended, nontender, no hepatosplenomegaly or mass  Extremities: warm, well perfused, no edema  MSK: no tenderness along the spine or long bones  Skin: No rash  Neuro: non-focal      Recent Results (from the past 24 hour(s))   CBC W/O DIFF    Collection Time: 22  2:10 PM   Result Value Ref Range    WBC 3.9 (L) 4.6 - 13.2 K/uL    RBC 1.38 (L) 4.20 - 5.30 M/uL    HGB 4.8 (LL) 12.0 - 16.0 g/dL    HCT 15.3 (LL) 35.0 - 45.0 %    .9 (H) 78.0 - 100.0 FL    MCH 34.8 (H) 24.0 - 34.0 PG    MCHC 31.4 31.0 - 37.0 g/dL    RDW 17.9 (H) 11.6 - 14.5 %    PLATELET 35 (L) 320 - 420 K/uL    MPV 10.3 9.2 - 11.8 FL    NRBC 0.0 0  WBC    ABSOLUTE NRBC 0.00 0.00 - 4.23 K/uL   METABOLIC PANEL, COMPREHENSIVE    Collection Time: 22  2:10 PM   Result Value Ref Range    Sodium 138 136 - 145 mmol/L    Potassium 3.7 3.5 - 5.5 mmol/L    Chloride 106 100 - 111 mmol/L    CO2 24 21 - 32 mmol/L    Anion gap 8 3.0 - 18 mmol/L    Glucose 124 (H) 74 - 99 mg/dL    BUN 22 (H) 7.0 - 18 MG/DL    Creatinine 2.76 (H) 0.6 - 1.3 MG/DL    BUN/Creatinine ratio 8 (L) 12 - 20      GFR est AA 20 (L) >60 ml/min/1.73m2    GFR est non-AA 17 (L) >60 ml/min/1.73m2    Calcium 7.5 (L) 8.5 - 10.1 MG/DL    Bilirubin, total 3.1 (H) 0.2 - 1.0 MG/DL    ALT (SGPT) 11 (L) 13 - 56 U/L    AST (SGOT) 15 10 - 38 U/L    Alk.  phosphatase 52 45 - 117 U/L    Protein, total 5.9 (L) 6.4 - 8.2 g/dL    Albumin 4.0 3.4 - 5.0 g/dL    Globulin 1.9 (L) 2.0 - 4.0 g/dL    A-G Ratio 2.1 (H) 0.8 - 1.7       Imagin/24/22 CT A/P:   IMPRESSION     1.  Cirrhosis of the liver with additional stigmata of portal hypertension to  include:     > Splenomegaly     > Gastroesophageal varices     > Splenorenal shunt     > Moderately large volume of intra-abdominal/pelvic ascites.     2. No abnormal bowel wall thickening or adenopathy.     3. Extensive body wall edema.     4. Age-indeterminate, favored chronic compression deformities of the T12, L2 and  L3 vertebral body superior endplates. > Grade 1 anterolisthesis L4 on L5 related to advanced facet arthrosis.

## 2022-01-27 NOTE — PROGRESS NOTES
Problem: Mobility Impaired (Adult and Pediatric)  Goal: *Acute Goals and Plan of Care (Insert Text)  Description: Physical Therapy Goals   Initiated 1/24/2022 and to be accomplished within 5 day(s)  1. Patient will move from supine <> sit with mod I in prep for out of bed activity and change of position. 2.  Patient will perform sit<> stand with mod I with RW in prep for transfers/ambulation. 3.  Patient will transfer from bed <> chair with mod I with RW for time up in chair for completion of ADL activity. 4.  Patient will ambulate 100 feet with mod I/RW for improved functional mobility at discharge. 5.  Patient will ascend/descend 2 stairs with handrail(s) with minimal assistance/contact guard assist for home re-entry as needed. Outcome: Progressing Towards Goal   PHYSICAL THERAPY TREATMENT    Patient: Gilberto Harp (89 y.o. female)  Date: 1/27/2022  Diagnosis: Anasarca [R60.1] Anasarca  Procedure(s) (LRB):  ESOPHAGOGASTRODUODENOSCOPY (EGD) (N/A) 1 Day Post-Op  Precautions: Fall  PLOF:     ASSESSMENT:  Pt supine in bed upon arrival.  Leah and increased time needed to sit up EOB. Elevated bed to assist with sit to stand. Upon standing pt reports having a BM. Ambulated to bathroom with RW. Assistance needed to pull down brief. Pt voided and had a BM. Assistance needed for veronica-care. Ambulated back to bed, Leah with LEs back to supine. Pt wheezing heavily. Notified nurse for a breathing TC. Progression toward goals:   []      Improving appropriately and progressing toward goals  [x]      Improving slowly and progressing toward goals  []      Not making progress toward goals and plan of care will be adjusted     PLAN:  Patient continues to benefit from skilled intervention to address the above impairments. Continue treatment per established plan of care.   Discharge Recommendations:  Home Health with hospice  Further Equipment Recommendations for Discharge:  rolling walker     SUBJECTIVE: Patient stated I pooped myself.     OBJECTIVE DATA SUMMARY:   Critical Behavior:  Neurologic State: Alert  Orientation Level: Appropriate for age,Oriented X4  Cognition: Appropriate decision making  Safety/Judgement: Awareness of environment,Fall prevention  Functional Mobility Training:  Bed Mobility:    Supine to Sit: Minimum assistance; Additional time  Sit to Supine: Minimum assistance  Scooting: Additional time  Transfers:  Sit to Stand: Contact guard assistance  Stand to Sit: Contact guard assistance;Stand-by assistance  Balance:  Sitting: Intact  Standing: Intact; With support   Ambulation/Gait Training:  Distance (ft): 22 Feet (ft)  Assistive Device: Gait belt;Walker, rolling  Ambulation - Level of Assistance: Stand-by assistance  Gait Abnormalities: Decreased step clearance  Base of Support: Widened        Pain:  Pain level pre-treatment: 0/10  Pain level post-treatment: 0/10   Pain Intervention(s): Medication (see MAR); Rest, Ice, Repositioning   Response to intervention: Nurse notified, See doc flow    Activity Tolerance:   Fair-/fair  Please refer to the flowsheet for vital signs taken during this treatment. After treatment:   [] Patient left in no apparent distress sitting up in chair  [x] Patient left in no apparent distress in bed  [x] Call bell left within reach  [x] Nursing notified  [] Caregiver present  [] Bed alarm activated  [] SCDs applied      COMMUNICATION/EDUCATION:   [x]         Role of Physical Therapy in the acute care setting. [x]         Fall prevention education was provided and the patient/caregiver indicated understanding. [x]         Patient/family have participated as able in working toward goals and plan of care. [x]         Patient/family agree to work toward stated goals and plan of care. []         Patient understands intent and goals of therapy, but is neutral about his/her participation.   []         Patient is unable to participate in stated goals/plan of care: ongoing with therapy staff.  []         Other:        Simi Oswald, PTA   Time Calculation: 23 mins

## 2022-01-27 NOTE — PROGRESS NOTES
D/C Plan: [facility]    CM contacted pt's son, Jose Tapia ([phone]), to discuss transition of care. Family has indicated they would like to have pt transition home with [address] [place] [address]. Family is prepared to accept this pt tomorrow. CM has confirmed pt's address [address]. [place] [address]). CM will request medical transport be arranged tomorrow morning.   CM to continue to follow and assist.    Care Management Interventions  Mode of Transport at Discharge: [facility] [place]  Transition of Care Consult (CM Consult): [address]: No  [facility]: No  Reason Outside [place]: Patient already serviced by other home care/hospice agency  Health Maintenance Reviewed: Yes  Physical Therapy Consult: Yes  Occupational Therapy Consult: Yes  Support Systems: Child(daniel),Other Family Member(s)  Confirm Follow Up Transport: Family  The Plan for Transition of Care is Related to the Following Treatment Goals : [facility]  The Patient and/or Patient Representative was Provided with a Choice of Provider and Agrees with the Discharge Plan?: Yes  Name of the Patient Representative Who was Provided with a Choice of Provider and Agrees with the Discharge Plan: sonPedro Luis of Choice List was Provided with Basic Dialogue that Supports the Patient's Individualized Plan of Care/Goals, Treatment Preferences and Shares the Quality Data Associated with the Providers?: Yes  Discharge Location  Patient Expects to be Discharged to[de-identified] Home with hospice

## 2022-01-27 NOTE — PROGRESS NOTES
Hospitalist Progress Note    Patient: Casandra Floor MRN: [id number]  CSN: [id number]    YOB: 1945  Age: 68 y.o. Sex: female    DOA: 1/23/2022 LOS:  LOS: 3 days                Assessment/Plan     Patient Active Problem List   Diagnosis Code    Total knee replacement status Z96.659    Nuclear sclerosis of left eye H25.12    Cirrhosis ([facility] [address] K74.60    Hypertension I10    GERD (gastroesophageal reflux disease) K21.9    Fibromyalgia M79.7    Anasarca R60.1    Ascites R18.8    Gastric varix I86.4    Stage 3 chronic kidney disease ([facility] [address] N18.30    Hypercholesterolemia E78.00    Hypothyroidism E03.9    COVID-19 vaccine dose not administered Z28.9    Severe anemia D64.9        Chief complaint :  Leg Swelling   68 y.o. female with cirrhosis with ascites, CKD 3, anemia is sent  to ER per Dr. Jayce Fisher due to worsening swelling. Anasarca   improving   due to hypoalbuminemia s/p cirrhosis   albumin infusion as scheduled      Cirrhosis with ascites   Cirrhosis is secondary to NAFLD   Dr. Josefina Gary f/u   Never had paracentesis done before   Ammonia level 57 no hx of HE on admission         Anemia -sob on exertion-symptomatic ,macrocytic -refuse blood transfusion -   Monitor Hb, no black stool noted    ct abdomen/pelvic negative for  hematoma   occult stool negative in Er    b12 and floate elevated , iron 104   Appreciated hematologist on board          Thrombocytopenia   secondary to cirrhosis. There is no evidence of overt bleeding.    No treatment is required.     CKD 3   F/u with [place] nephrologist  Cr is stable at her baseline   Avoid IV contrast renal dose medicine      HTN, accelerated  Continue home medication.     Hypothyroidism  continue synthroid        Rash   Nystatin powder     Discussed with pt, son and daughter in law about anemia which can be life-threatening if not corrected, such as cardiac arrest, multiorgan failure. she indicated understanding. She refuses.      Disposition : TBD    Review of systems  General: No fevers or chills. Cardiovascular: No chest pain or pressure. No palpitations. Pulmonary: No shortness of breath. Gastrointestinal: No nausea, vomiting. Physical Exam:  General: Awake, cooperative, no acute distress    HEENT: NC, Atraumatic. PERRLA, anicteric sclerae. Lungs: CTA Bilaterally. No Wheezing/Rhonchi/Rales. Heart:  S1 S2,  No murmur, No Rubs, No Gallops  Abdomen: Soft, distended, Non tender.  +Bowel sounds,   Extremities: No c/c/e  Psych:   Not anxious or agitated. Neurologic:  No acute neurological deficit. Vital signs/Intake and Output:  Visit Vitals  BP (!) 114/46   Pulse 87   Temp 98.2 °F (36.8 °C)   Resp 20   Ht 5' 3\" (1.6 m)   Wt 106.8 kg (235 lb 8 oz)   SpO2 100%   Breastfeeding No   BMI 41.72 kg/m²     Current Shift:  No intake/output data recorded. Last three shifts:  01/25 0701 - 01/26 1900  In: 940 [P.O.:540; I.V.:400]  Out: 0             Labs: Results:       Chemistry Recent Labs     01/26/22  1410 01/24/22  0445   * 89    139   K 3.7 3.8    108   CO2 24 27   BUN 22* 20*   CREA 2.76* 2.04*   CA 7.5* 7.5*   AGAP 8 4   BUCR 8* 10*   AP 52 70   TP 5.9* 5.0*   ALB 4.0 2.6*   GLOB 1.9* 2.4   AGRAT 2.1* 1.1      CBC w/Diff Recent Labs     01/26/22  1410 01/25/22  0435 01/24/22  0750 01/24/22  0445 01/24/22  0445   WBC 3.9* 2.5*  --   --  2.6*   RBC 1.38* 1.38*  --   --  1.47*   HGB 4.8* 4.8* 5.2*   < > 5.1*   HCT 15.3* 15.2* 16.9*   < > 16.0*   PLT 35* 41*  --   --  48*   GRANS  --  62  --   --  55   LYMPH  --  21  --   --  24   EOS  --  5  --   --  8*    < > = values in this interval not displayed. Cardiac Enzymes No results for input(s): CPK, CKND1, LIVE in the last 72 hours.     No lab exists for component: CKRMB, TROIP   Coagulation Recent Labs     01/25/22  0815   PTP 24.0*   INR 2.3*   APTT 47.2*       Lipid Panel No results found for: CHOL, CHOLPOCT, CHOLX, CHLST, CHOLV, 216231, HDL, HDLP, LDL, LDLC, DLDLP, 686699, VLDLC, VLDL, TGLX, TRIGL, TRIGP, TGLPOCT, CHHD, CHHDX   BNP No results for input(s): BNPP in the last 72 hours.    Liver Enzymes Recent Labs     01/26/22  1410   TP 5.9*   ALB 4.0   AP 52      Thyroid Studies Lab Results   Component Value Date/Time    TSH 9.94 (H) 01/23/2022 01:44 PM        Procedures/imaging: see electronic medical records for all procedures/Xrays and details which were not copied into this note but were reviewed prior to creation of Plan

## 2022-01-27 NOTE — ROUTINE PROCESS
Bedside shift change report given to Jeff Le RN (oncoming nurse) by Ashley Smith RN   (offgoing nurse). Report included the following information SBAR, Kardex, Intake/Output, MAR and Recent Results.

## 2022-01-27 NOTE — PROGRESS NOTES
Palliative Medicine Consult    Patient Name: Gigi Ramsay  YOB: 1945    Date of follow up: 1/27/2022  Reason for Consult: Goals of care discussions  Requesting Provider: Dr. Grupo Dodge  Primary Care Physician: Shruthi Casey MD      SUMMARY:   Gigi Ramsay is a 68 y.o. with a past history of cirrhosis with ascites, chronic kidney disease stage III, fibromyalgia, and hypertension who was admitted on 1/23/2022 from home (lives with son Candy Levin and daughter-in-law Gail Wan) with a diagnosis of severe anemia, cirrhosis, ascites, and chronic kidney disease stage 3. Current medical issues leading to Palliative Medicine involvement include: Support and goals of care discussions. 1/25/2022: Patient is awake, alert, and remains oriented. Family and patient discussed goals of care last night. Patient does not want a blood transfusion for any reason. She otherwise wants to continue full code with full interventions. 1/27/2022: Patient extremely weak this AM, not able to feed self. Feed her but she only ate about 10 percent of her meal. She wants to go home with hospice. PALLIATIVE DIAGNOSES:   1. Goals of care discussions  2. Severe anemia  3. Cirrhosis with ascites  4. Chronic kidney disease stage III  5. Advanced age/debility       PLAN:   1/27/2022: Palliative medicine team including Romel Downey RN and I met with patient at patient's bedside. Patient's son Candy Levin and daughter-in-law  Taras Hernandez present via patient's FaceTime. Patient and family had the chance to talk, and patient wants to go home. With Dr. Angelia Painting assistance at bedside, explained limited treatment options for advanced liver disease and severe anemia (declines blood transfusions.) Patient confirms she wants to go home with hospice. Patient's son and daughter in law are MPOA and are in agreement. They request that she be discharged with hospice tomorrow before the anticipated snow storm comes.  Explained comfort measures in detail, and re-explained the benefits and burdens of resuscitation. Patient agrees with DNR/DNI, and comfort measures to be initiated at discharge. Wants to continue supportive treatments while hospitalized. Per discussion with patient's son, they have been in contact with  [address] [place] [address] and this is who they are requesting. Family states that this hospice is willing to allow blood stimulating products and work closely with Dr. Rock Truong (will defer this to hospice and Dr. Rock Truong). Hospice consult placed. Patient signed a POST form today for DNR/DNI, comfort measures (to be initiated at discharge, OK with blood stimulating products if a plan is worked out between Dr. Rock Truong and hospice agency) and no feeding tubes. See previous discussions below:    1/25/2022: Palliative medicine team including Saman Rios. And I met with patient at patient's bedside. Patient's son Candy Levin was on video chat during the visit. Patient is awake, alert, and remains oriented. Family and patient discussed goals of care last night. Patient does not want a blood transfusion for any reason. She does not want someone else's blood in her body, and she is not a Anabaptism, but does believe in some of the Baptism beliefs behind accepting someone else's blood. She is accepting of albumin and is ok with receiving all other alternative blood products. Hematology following with plans to attempt optimizing her Hg levels without blood transfusions. Patient continues to receive IV albumin and edema is improving slowly- hepatologist Dr. Torres Will continues to follow. Patient asked when she was being discharged. Explained that her blood count is critically low and we are continuing to remove fluid from her body, and it is unknown at this time her anticipated discharge date.  Reiterated that if the hospitalization becomes too difficult and her goal is to go home, we could readdress goals of care to include comfort measures and hospice support. Ludwig Tan was on the phone during entire conversation and understands her prognosis is poor and agrees to continue talking with patient regarding the possibility of home with hospice. At this time patient wants to remain a full code with full interventions, but no blood. We will continue to follow closely for support and further goals of care discussions. See previous discussions below:     1/24/2022: Goals of care discussions: Palliative medicine team including Beth Duvall RN and I met with patient at patient's bedside. Patient is awake, alert, and oriented x4. Patient has just completed an advanced medical directive with the  (appreciate assistance) where she assigned her primary medical power of  as her son Reyes Arreola, and her secondary medical power of  as daughter-in-law Olga Dodson. Patient also noted she would want all life prolonging measures even in the setting of terminal illness and unawareness. She is receiving albumin infusions for anasarca, she has never had paracentesis done before. She is experiencing severe anemia with shortness of breath on exertion, she is undecided on whether she will accept blood products. She notes that she is not a Confucianist, but does believe in some of the Faith believes behind accepting someone else's blood. Patient wanted her son Ludwig Tan and daughter-in-law Fide Joaquin included in goals of care conversation, called them on phone in patient's room and placed them on speaker phone. Discussed the benefits and burdens of continuing aggressive measures in the setting of advanced liver disease and severe anemia, including potential need for multiple blood transfusions. Also discussed transitioning to comfort measures with the support of hospice at discharge.   Family is coming to the hospital today to speak with patient in person, advised them to notify RN should patient become agreeable to blood products, to ensure no delay in care. At this time patient is a full code with full interventions but is unclear if she wants blood transfusion or not. We will follow up tomorrow to readdress goals of care once patient and family have an opportunity to talk today. 1. Severe anemia: Symptomatic, shortness of breath on exertion. She has been refusing blood transfusions, stating she really needs to think about it. No black stool noted, CT abdomen pelvis pending for rule out hematoma. Occult stool negative in ER. Plan for hematology consult if no hematoma noted on CT per primary team.  Last hemoglobin 5.2 g/dL. 2. Cirrhosis with ascites: Followed by Dr. Phan Helton, came in due to increasing fluid buildup and need for albumin transfusions. Her MELD was recently calculated at 25. Reviewed most recent office visit with Dr. Phan Helton, patient is not ready for hospice care. 3. Chronic kidney disease stage III: Monitoring per primary team  4. Advanced age/debility: 59-year-old female who lives with her son and daughter-in-law. She receives care from [facility], needs assistance with all functional ADLs. She has a wheelchair, rolling walker, and a shower chair as needed. 5. Initial consult note routed to primary continuity provider  6. Communicated plan of care with: Palliative IDT       GOALS OF CARE / TREATMENT PREFERENCES:   [====Goals of Care====]  GOALS OF CARE: DNR/DNI, comfort measures to be initiated at discharge, no feeding tubes  Patient/Health Care Proxy Stated Goals: Comfort      TREATMENT PREFERENCES:   Code Status: DNR    Advance Care Planning:  Advance Care Planning 1/24/2022   Patient's Healthcare Decision Maker is: Named in scanned ACP document   Primary Decision Maker Name -   Primary Decision Maker Phone Number -   Primary Decision Maker Relationship to Patient -   Confirm Advance Directive Yes, on file   Patient Would Like to Complete Advance Directive -       Medical Interventions:  Other (comment) (comfort measures to be initiated at discharge.)    Artificially Administered Nutrition: No feeding tube      The palliative care team has discussed with patient / health care proxy about goals of care / treatment preferences for patient.  [====Goals of Care====]         HISTORY:     History obtained from: patient, family, chart    CHIEF COMPLAINT: fluid in legs and abdomen, not able to feed self due to weakness    HPI/SUBJECTIVE:    The patient is:   [x] Verbal and participatory  [] Non-participatory due to:   Oriented x 4    Clinical Pain Assessment (nonverbal scale for severity on nonverbal patients):   Clinical Pain Assessment  Severity: 0            FUNCTIONAL ASSESSMENT:     Palliative Performance Scale (PPS):   PPS: 30 (not able to feed self this AM)       PSYCHOSOCIAL/SPIRITUAL SCREENING:     Advance Care Planning:  Advance Care Planning 1/24/2022   Patient's Healthcare Decision Maker is: Named in scanned ACP document   Primary Decision Maker Name -   Primary Decision [address] Phone Number -   Primary Decision Maker Relationship to Patient -   Confirm Advance Directive Yes, on file   Patient Would Like to Complete Advance Directive -        Any spiritual / Pentecostalism concerns:  [] Yes /  [x] No    Caregiver Burnout:  [] Yes /  [] No /  [x] No Caregiver Present      Anticipatory grief assessment:   [] Normal  / [x] Maladaptive            REVIEW OF SYSTEMS:     Positive and pertinent negative findings in ROS are noted above in HPI. The following systems were [x] reviewed / [] unable to be reviewed as noted in HPI  Other findings are noted below. Systems: constitutional, ears/nose/mouth/throat, respiratory, gastrointestinal, genitourinary, musculoskeletal, integumentary, neurologic, psychiatric, endocrine. Positive findings noted below.   Modified ESAS Completed by: provider   Fatigue: 6       Pain: 0   Anxiety: 0 Nausea: 0     Dyspnea: 3 (with minimal exertion)     Constipation: No     Stool Occurrence(s): 1 PHYSICAL EXAM:     From RN flowsheet:  Wt Readings from Last 3 Encounters:   01/27/22 106.8 kg (235 lb 8 oz)   01/19/22 101.2 kg (223 lb)   11/24/21 94.3 kg (208 lb)     Blood pressure 132/60, pulse 96, temperature 98.5 °F (36.9 °C), resp. rate 22, height 5' 3\" (1.6 m), weight 106.8 kg (235 lb 8 oz), SpO2 96 %, not currently breastfeeding.     Pain Scale 1: Numeric (0 - 10)  Pain Intensity 1: 0     Pain Location 1: Knee           Constitutional: Awake, alert, appears chronically ill and debilitated  Eyes: pupils equal, anicteric  ENMT: no nasal discharge, moist mucous membranes  Cardiovascular: regular rhythm, distal pulses intact  Respiratory: breathing not labored, symmetric  Gastrointestinal: firm, distended  Musculoskeletal: no deformity, no tenderness to palpation  Skin: warm, dry  Neurologic: following commands, moving all extremities, oriented x 4, upper body weakness with tremors when attempting to feed self  Psychiatric: flat affect, no hallucinations       HISTORY:     Principal Problem:    Anasarca ()    Active Problems:    Cirrhosis (HCC) ()      Hypertension ()      Overview: well controlled with meds patient states      GERD (gastroesophageal reflux disease) ()      Ascites ()      Stage 3 chronic kidney disease (HCC) ()      Hypothyroidism ()      COVID-19 vaccine dose not administered (1/23/2022)      Severe anemia (1/25/2022)      Past Medical History:   Diagnosis Date    Fibromyalgia     GERD (gastroesophageal reflux disease)     Hypertension     well controlled with meds patient states    Liver disease       Past Surgical History:   Procedure Laterality Date    HX BREAST LUMPECTOMY      bilateral    HX CATARACT REMOVAL Right 09/01/2016    HX DILATION AND CURETTAGE      HX GI      colonoscopy, EGD    HX HEENT      sinus surgery    HX KNEE REPLACEMENT Right 08/16/2017    HX MOHS PROCEDURES      bilateral    HX ORTHOPAEDIC      right foot surgery     HX ORTHOPAEDIC      right knee surgery  HX ORTHOPAEDIC      bilateral carpal tunnel release     HX ORTHOPAEDIC Left 2017    foot    HX OTHER SURGICAL      removal of fatty tumor back and abdomen    HX ROTATOR CUFF REPAIR Bilateral     HX TONSILLECTOMY        History reviewed. No pertinent family history. History reviewed, no pertinent family history. Social History     Tobacco Use    Smoking status: Former Smoker     Quit date:      Years since quittin.1    Smokeless tobacco: Never Used   Substance Use Topics    Alcohol use: No     Allergies   Allergen Reactions    Latex Hives    Acetaminophen Other (comments)     Can not take- has advanced liver disease.     Adhesive Tape-Silicones Other (comments)     Skin burns    Aspirin Other (comments)     Dr. Ricky Godwin does not want pt to take it     Codeine Nausea and Vomiting and Other (comments)    Dilaudid [Hydromorphone] Nausea and Vomiting     Patient also complained of weakness    Doxycycline Hives and Nausea and Vomiting    Hydrocodone Nausea and Vomiting     Elevated blood pressure, effected her eyes, weakness    Oxycodone Itching and Nausea and Vomiting     Patient complained of vision disturbance    Penicillins Hives and Other (comments)     \" I go blind\"    Sulfa (Sulfonamide Antibiotics) Unknown (comments)      Current Facility-Administered Medications   Medication Dose Route Frequency    arformoteroL (BROVANA) neb solution 15 mcg  15 mcg Nebulization BID RT    budesonide (PULMICORT) 500 mcg/2 ml nebulizer suspension  500 mcg Nebulization BID RT    albuterol (PROVENTIL VENTOLIN) nebulizer solution 2.5 mg  2.5 mg Nebulization QID RT    albuterol-ipratropium (DUO-NEB) 2.5 MG-0.5 MG/3 ML  3 mL Nebulization Q4H PRN    pantoprazole (PROTONIX) tablet 40 mg  40 mg Oral BID    sodium chloride (NS) flush 5-40 mL  5-40 mL IntraVENous Q8H    sodium chloride (NS) flush 5-40 mL  5-40 mL IntraVENous PRN    bisacodyL (DULCOLAX) suppository 10 mg  10 mg Rectal DAILY PRN    promethazine (PHENERGAN) tablet 12.5 mg  12.5 mg Oral Q6H PRN    Or    ondansetron (ZOFRAN) injection 4 mg  4 mg IntraVENous Q6H PRN    nystatin (MYCOSTATIN) 100,000 unit/gram powder   Topical BID    gabapentin (NEURONTIN) capsule 300 mg  300 mg Oral DAILY    levothyroxine (SYNTHROID) tablet 75 mcg  75 mcg Oral ACB    pravastatin (PRAVACHOL) tablet 10 mg  10 mg Oral QHS    folic acid (FOLVITE) tablet 1 mg  1 mg Oral DAILY    escitalopram oxalate (LEXAPRO) tablet 20 mg  20 mg Oral DAILY    albumin human 25% (BUMINATE) solution 25 g  25 g IntraVENous Q6H          LAB AND IMAGING FINDINGS:     Lab Results   Component Value Date/Time    WBC 3.9 (L) 01/26/2022 02:10 PM    HGB 4.8 (LL) 01/26/2022 02:10 PM    PLATELET 35 (L) 20/92/2448 02:10 PM     Lab Results   Component Value Date/Time    Sodium 136 01/27/2022 09:35 AM    Potassium 3.9 01/27/2022 09:35 AM    Chloride 105 01/27/2022 09:35 AM    CO2 22 01/27/2022 09:35 AM    BUN 23 (H) 01/27/2022 09:35 AM    Creatinine 3.20 (H) 01/27/2022 09:35 AM    Calcium 7.8 (L) 01/27/2022 09:35 AM      Lab Results   Component Value Date/Time    Alk.  phosphatase 52 01/26/2022 02:10 PM    Protein, total 5.9 (L) 01/26/2022 02:10 PM    Albumin 4.0 01/26/2022 02:10 PM    Globulin 1.9 (L) 01/26/2022 02:10 PM     Lab Results   Component Value Date/Time    INR 2.3 (H) 01/25/2022 08:15 AM    Prothrombin time 24.0 (H) 01/25/2022 08:15 AM    aPTT 47.2 (H) 01/25/2022 08:15 AM      Lab Results   Component Value Date/Time    Iron 104 01/23/2022 01:44 PM    TIBC 165 (L) 01/23/2022 01:44 PM    Iron % saturation 63 (H) 01/23/2022 01:44 PM    Ferritin 330 01/25/2022 04:35 AM      No results found for: PH, PCO2, PO2  No components found for: GLPOC   No results found for: CPK, CKMB             Total time: 35 minutes  Counseling / coordination time, spent as noted above:   > 50% counseling / coordination?: yes, patient, family, and medical team    Prolonged service was provided for  []30 min []75 min in face to face time in the presence of the patient, spent as noted above. Time Start:   Time End:   Note: this can only be billed with 86993 (initial) or 07693 (follow up). If multiple start / stop times, list each separately.

## 2022-01-27 NOTE — ROUTINE PROCESS
Bedside and Verbal shift change report given to Theodore Marie RN/Lissa RN (oncoming nurse) by Kandis Neville RN (offgoing nurse). Report included the following information SBAR, Kardex, Intake/Output and MAR.

## 2022-01-27 NOTE — PROGRESS NOTES
2019-alert and oriented x 4. Lungs with wheezes throughout after ambulating to bathroom, SOB. Respiratory therapy has recently left room and gave her a breathing treatment, so wheezing is decreasing. BS active x 4 quads. IV access to right forearm, patent. Denies pain at this time. Dr. Sheldon Cavanaugh paged d/t RT request, to add pulmicort and/or brovano to respiratory treatments and she did approve these. 0108-Patient with wheezing and decreased O2 sats at 87-89%. RT called for breathing treatment. Patient refused lab draw, saying her son says she should not get anymore labs drawn. Shift summary-patient continues to have wheezing and SOB with exertion. Breathing treatments decreased the wheezing. Now on 2LNC, resting in bed d/t O2 sats at 87%. Denies pain through the noc.

## 2022-01-27 NOTE — PROGRESS NOTES
Occupational Therapy Treatment Attempt     Chart reviewed. Attempted Occupational Therapy Treatment, however, patient unable to be seen due to:  []  Nausea/vomiting  []  Eating  []  Pain  []  Patient too lethargic  []  Off Unit for testing/procedure  []  Dialysis treatment in progress  []  Telemetry Results  [x]  Other: Pt still tired from PT session this moring. Will f/u later as patient's schedule allows.   EARLINE Vasquez

## 2022-01-27 NOTE — ACP (ADVANCE CARE PLANNING)
Palliative Medicine    CODE STATUS: DNR/DNI; comfort measures to begin at discharge with hospice; no feeding tubes    AMD Status: on file naming her son Morales Agarwal) and daughter-in-law Geetha Webb)     1/27/2022 4916 Seen today in room 313 along with Mike Walden NP. Lying in bed with head of bed elevated eating breakfast. States \"My hands are so shaky. I need help\". Awake, alert. Oriented  X 4. Respirations mildly labored, wheezing noticed. Oxygen on at 2 lpm per NC . Able to speak in 4-5 word sentences words full  sentences. Pain -- denies. Luis Martinez and Reinaldoantoinette May were on facetime during the initial visit. We all discussed that Mrs Amari Pop was open to hospice but was not ready to institute their services at this time. She wanted to go home with [facility] as they have a bridge hospice program. She was open to receiving erythropoietin through the coordination of Dr Kylee Neville and Jett Sandra. That call was ended and Mrs Amari Pop was helped to eat her breakfast.    Dr Faisal José then came into the room and gave a forceful update explaining that there were no real options to reverse the current situation and given her choice to decline blood transfusions, initiating hospice now seems reasonable. Mrs Amari Pop initiated another facetime call with Reinaldo May and updated her on Dr Erma Essex recommendations. Mrs Amari Pop said she wanted to come home with hospice now. Reinaldoantoinette May agreed to same. POST completed to support his choice (see below). Care manager, Estefana Lundborg, notified to patient's choice. Clinical nurse and hospitalist notified of codes status change. Orders placed. Disposition plan: anticipate home with hospice    Palliative care will continue to follow Kary Ahumada and her family during amy hospitalization and support them as they make healthcare decisions and define goals of care.     [facility] [address] (Physician Orders for Scope of Treatment)  Participants:   [x] Patient    [x] Healthcare agent (already designated in existing ACP document)    Name: Chanda Izaguirre  Relationship to Patient: daughter-in-law    Phone number:     [facility] ACP Facilitator: Sera Doshi NP      Conversation Topics   Understanding of Medical Condition/s AND Potential Complications: I know there isn't much they can do for me    Cardiopulmonary Resuscitation    Order Elected for CPR:  []  Attempt Resuscitation [x]  Do Not Attempt Resuscitation  When NOT in Cardiopulmonary Arrest, Order Elected:    [] Comfort Measures (TO BEGIN AT [address])  [] Limited Additional Interventions  [] Full Interventions  Artificially Administered Nutrition, Order Elected:  [x] No Feeding Tube   [] Feeding Tube for a defined trial period  [] Feeding Tube long-term if indicated  The following was provided (check all that apply):     [] Review of existing Advance Directive  [] Assistance with Completion of New Advance Directive   [x] Review of [place] Form       Meeting Outcomes:   [x] ACP discussion completed   [x] [place] form completed  [x] [place] prepared for Provider review and signature   [x] Original placed on Chart, if in facility (form to be sent with patient at discharge)  [x] Copy given to healthcare agent    [x] Copy scanned to electronic medical record      Bhavna Mccabe RN, MSN  Palliative Medicine  [phone]

## 2022-01-27 NOTE — PROGRESS NOTES
Hospitalist Progress Note-critical care note     Patient: Nakia Stephens MRN: [id number]  CSN: [id number]    YOB: 1945  Age: 68 y.o. Sex: female    DOA: 1/23/2022 LOS:  LOS: 4 days            Chief complaint: severe anemia cirrhosis, gerd ,ascites, ckd3     Assessment/Plan         Hospital Problems  Date Reviewed: 1/26/2022          Codes Class Noted POA    Severe anemia ICD-10-CM: D64.9  ICD-9-CM: 285.9  1/25/2022 Unknown        COVID-19 vaccine dose not administered ICD-10-CM: Z28.9  ICD-9-CM: V64.00  1/23/2022 Unknown        Cirrhosis ([facility] ICD-10-CM: K74.60  ICD-9-CM: 571.5  Unknown Yes        Hypertension ICD-10-CM: I10  ICD-9-CM: 401.9  Unknown Yes    Overview Signed 12/11/2021 10:59 AM by Ollie Glynn MD     well controlled with meds patient states             GERD (gastroesophageal reflux disease) ICD-10-CM: K21.9  ICD-9-CM: 530.81  Unknown Yes        * (Principal) Anasarca ICD-10-CM: R60.1  ICD-9-CM: 545. 3  Unknown Unknown        Ascites ICD-10-CM: R18.8  ICD-9-CM: 789.59  Unknown Yes        Stage 3 chronic kidney disease ([facility] ICD-10-CM: N18.30  ICD-9-CM: 835. 3  Unknown Yes        Hypothyroidism ICD-10-CM: E03.9  ICD-9-CM: 244.9  Unknown Yes               anasarca   Mild improving   due to hypoalbuminemia s/p cirrhosis   Continue albumin infusion as scheduled      Cirrhosis with ascites   Cirrhosis is secondary to NAFLD   Dr. Edilia Faulkner f/u   Never had paracentesis done before   Ammonia level 57 no hx of HE on admission         anemia -sob on exertion-symptomatic ,macrocytic -refuse blood transfusion - scoped per dr. Robert Esteves , no active bleeding noted   Continue trending down, stable now    ct abdomen/pelvic negative for  hematoma   occult stool negative in Er    b12 and floate elevated ,iron 104   Appreciated hematologist f/u ,          Thrombocytopenia   secondary to cirrhosis.   There is no evidence of overt bleeding.    No treatment is required.     ckd 3   F/u [facility]  Cr is stable at her baseline   Avoid IV contrast renal dose medicine      HTN, accelerated  Continue home medication. Hypothyroidism  continue synthroid        Rash   Nystatin powder    Subjective: I feel fine     Discussed with pt about the prognosis with palliative care team presents. Palliative care team talked with family and patient, agree to be dnr/i and go to home -hospice tomorrow. Disposition :tomorrow   Review of systems:    General: No fevers or chills. Cardiovascular: No chest pain or pressure. No palpitations. Pulmonary: + shortness of breath on exertion   Gastrointestinal: No nausea, vomiting. Vital signs/Intake and Output:  Visit Vitals  BP (!) 112/50 (BP 1 Location: Right arm)   Pulse 88   Temp 98.4 °F (36.9 °C)   Resp 18   Ht 5' 3\" (1.6 m)   Wt 106.8 kg (235 lb 8 oz)   SpO2 95%   Breastfeeding No   BMI 41.72 kg/m²     Current Shift:  No intake/output data recorded. Last three shifts:  01/25 1901 - 01/27 0700  In: 540 [P.O.:240; I.V.:300]  Out: 0     Physical Exam:  General: WD, WN. Alert, cooperative, no acute distress    HEENT: NC, Atraumatic. PERRLA, anicteric sclerae. Lungs: CTA Bilaterally. No Wheezing/Rhonchi/Rales. Heart:  Regular  rhythm,  No murmur, No Rubs, No Gallops  Abdomen: + distended, Non tender. +Bowel sounds,   Extremities: No c/c. Edema mild improving   Psych:   Not anxious or agitated. Neurologic:  No acute neurological deficit. Labs: Results:       Chemistry Recent Labs     01/26/22  1410   *      K 3.7      CO2 24   BUN 22*   CREA 2.76*   CA 7.5*   AGAP 8   BUCR 8*   AP 52   TP 5.9*   ALB 4.0   GLOB 1.9*   AGRAT 2.1*      CBC w/Diff Recent Labs     01/26/22  1410 01/25/22  0435   WBC 3.9* 2.5*   RBC 1.38* 1.38*   HGB 4.8* 4.8*   HCT 15.3* 15.2*   PLT 35* 41*   GRANS  --  62   LYMPH  --  21   EOS  --  5      Cardiac Enzymes No results for input(s): CPK, CKND1, LIVE in the last 72 hours.     No lab exists for component: CKRMB, TROIP   Coagulation Recent Labs     01/25/22  0815   PTP 24.0*   INR 2.3*   APTT 47.2*       Lipid Panel No results found for: CHOL, CHOLPOCT, CHOLX, CHLST, CHOLV, 975697, HDL, HDLP, LDL, LDLC, DLDLP, 903045, VLDLC, VLDL, TGLX, TRIGL, TRIGP, TGLPOCT, CHHD, CHHDX   BNP No results for input(s): BNPP in the last 72 hours. Liver Enzymes Recent Labs     01/26/22  1410   TP 5.9*   ALB 4.0   AP 52      Thyroid Studies Lab Results   Component Value Date/Time    TSH 9.94 (H) 01/23/2022 01:44 PM        Procedures/imaging: see electronic medical records for all procedures/Xrays and details which were not copied into this note but were reviewed prior to creation of Plan    No results found.     Tee Person MD

## 2022-01-27 NOTE — PROGRESS NOTES
Discussed with dr Lauri Taylor about pts condition-and will being dc home hospice and family concern about albumin.  He supports familys decision and no need to complete five days albumin

## 2022-01-28 VITALS
TEMPERATURE: 98.2 F | SYSTOLIC BLOOD PRESSURE: 105 MMHG | RESPIRATION RATE: 20 BRPM | OXYGEN SATURATION: 95 % | WEIGHT: 239 LBS | HEART RATE: 91 BPM | BODY MASS INDEX: 42.35 KG/M2 | DIASTOLIC BLOOD PRESSURE: 53 MMHG | HEIGHT: 63 IN

## 2022-01-28 LAB
ALBUMIN SERPL-MCNC: 4.7 G/DL (ref 3.4–5)
ALBUMIN/GLOB SERPL: 3.1 {RATIO} (ref 0.8–1.7)
ALP SERPL-CCNC: 41 U/L (ref 45–117)
ALT SERPL-CCNC: 9 U/L (ref 13–56)
ANION GAP SERPL CALC-SCNC: 9 MMOL/L (ref 3–18)
AST SERPL-CCNC: 14 U/L (ref 10–38)
BILIRUB SERPL-MCNC: 3.9 MG/DL (ref 0.2–1)
BUN SERPL-MCNC: 26 MG/DL (ref 7–18)
BUN/CREAT SERPL: 7 (ref 12–20)
CALCIUM SERPL-MCNC: 7.7 MG/DL (ref 8.5–10.1)
CHLORIDE SERPL-SCNC: 105 MMOL/L (ref 100–111)
CO2 SERPL-SCNC: 23 MMOL/L (ref 21–32)
CREAT SERPL-MCNC: 3.78 MG/DL (ref 0.6–1.3)
ERYTHROCYTE [DISTWIDTH] IN BLOOD BY AUTOMATED COUNT: 17.7 % (ref 11.6–14.5)
GLOBULIN SER CALC-MCNC: 1.5 G/DL (ref 2–4)
GLUCOSE SERPL-MCNC: 122 MG/DL (ref 74–99)
HCT VFR BLD AUTO: 13 % (ref 35–45)
HGB BLD-MCNC: 4.1 G/DL (ref 12–16)
MCH RBC QN AUTO: 35.7 PG (ref 24–34)
MCHC RBC AUTO-ENTMCNC: 31.5 G/DL (ref 31–37)
MCV RBC AUTO: 113 FL (ref 78–100)
NRBC # BLD: 0 K/UL (ref 0–0.01)
NRBC BLD-RTO: 0 PER 100 WBC
PLATELET # BLD AUTO: 32 K/UL (ref 135–420)
PMV BLD AUTO: 11.7 FL (ref 9.2–11.8)
POTASSIUM SERPL-SCNC: 3.6 MMOL/L (ref 3.5–5.5)
PROT SERPL-MCNC: 6.2 G/DL (ref 6.4–8.2)
RBC # BLD AUTO: 1.15 M/UL (ref 4.2–5.3)
SODIUM SERPL-SCNC: 137 MMOL/L (ref 136–145)
WBC # BLD AUTO: 4.7 K/UL (ref 4.6–13.2)

## 2022-01-28 PROCEDURE — 99231 SBSQ HOSP IP/OBS SF/LOW 25: CPT | Performed by: NURSE PRACTITIONER

## 2022-01-28 PROCEDURE — 74011000250 HC RX REV CODE- 250: Performed by: FAMILY MEDICINE

## 2022-01-28 PROCEDURE — 74011250637 HC RX REV CODE- 250/637: Performed by: HOSPITALIST

## 2022-01-28 PROCEDURE — P9047 ALBUMIN (HUMAN), 25%, 50ML: HCPCS | Performed by: HOSPITALIST

## 2022-01-28 PROCEDURE — 74011000250 HC RX REV CODE- 250: Performed by: HOSPITALIST

## 2022-01-28 PROCEDURE — 94640 AIRWAY INHALATION TREATMENT: CPT

## 2022-01-28 PROCEDURE — 77010033678 HC OXYGEN DAILY

## 2022-01-28 PROCEDURE — 74011250636 HC RX REV CODE- 250/636: Performed by: HOSPITALIST

## 2022-01-28 PROCEDURE — 85027 COMPLETE CBC AUTOMATED: CPT

## 2022-01-28 PROCEDURE — 80053 COMPREHEN METABOLIC PANEL: CPT

## 2022-01-28 PROCEDURE — 51798 US URINE CAPACITY MEASURE: CPT

## 2022-01-28 PROCEDURE — 36415 COLL VENOUS BLD VENIPUNCTURE: CPT

## 2022-01-28 RX ORDER — LEVOFLOXACIN 5 MG/ML
250 INJECTION, SOLUTION INTRAVENOUS
Status: DISCONTINUED | OUTPATIENT
Start: 2022-01-30 | End: 2022-01-28 | Stop reason: HOSPADM

## 2022-01-28 RX ORDER — ARFORMOTEROL TARTRATE 15 UG/2ML
15 SOLUTION RESPIRATORY (INHALATION) 2 TIMES DAILY
Qty: 60 EACH | Refills: 0 | Status: SHIPPED | OUTPATIENT
Start: 2022-01-28

## 2022-01-28 RX ORDER — LEVOFLOXACIN 500 MG/1
250 TABLET, FILM COATED ORAL
Qty: 3 TABLET | Refills: 0 | Status: SHIPPED | OUTPATIENT
Start: 2022-01-28

## 2022-01-28 RX ORDER — ARFORMOTEROL TARTRATE 15 UG/2ML
15 SOLUTION RESPIRATORY (INHALATION) 2 TIMES DAILY
Qty: 60 EACH | Refills: 0 | Status: SHIPPED | OUTPATIENT
Start: 2022-01-28 | End: 2022-01-28 | Stop reason: SDUPTHER

## 2022-01-28 RX ORDER — IPRATROPIUM BROMIDE AND ALBUTEROL SULFATE 2.5; .5 MG/3ML; MG/3ML
3 SOLUTION RESPIRATORY (INHALATION)
Qty: 30 NEBULE | Refills: 0 | Status: SHIPPED | OUTPATIENT
Start: 2022-01-28

## 2022-01-28 RX ORDER — BUDESONIDE 0.5 MG/2ML
500 INHALANT ORAL 2 TIMES DAILY
Qty: 60 EACH | Refills: 0 | Status: SHIPPED | OUTPATIENT
Start: 2022-01-28

## 2022-01-28 RX ORDER — BUDESONIDE 0.5 MG/2ML
500 INHALANT ORAL 2 TIMES DAILY
Qty: 60 EACH | Refills: 0 | Status: SHIPPED | OUTPATIENT
Start: 2022-01-28 | End: 2022-01-28 | Stop reason: SDUPTHER

## 2022-01-28 RX ORDER — LEVOFLOXACIN 500 MG/1
500 TABLET, FILM COATED ORAL ONCE
Status: COMPLETED | OUTPATIENT
Start: 2022-01-28 | End: 2022-01-28

## 2022-01-28 RX ORDER — FUROSEMIDE 10 MG/ML
10 INJECTION INTRAMUSCULAR; INTRAVENOUS ONCE
Status: COMPLETED | OUTPATIENT
Start: 2022-01-28 | End: 2022-01-28

## 2022-01-28 RX ORDER — LEVOFLOXACIN 500 MG/1
250 TABLET, FILM COATED ORAL
Qty: 3 TABLET | Refills: 0 | Status: SHIPPED | OUTPATIENT
Start: 2022-01-28 | End: 2022-01-28 | Stop reason: SDUPTHER

## 2022-01-28 RX ADMIN — ALBUMIN (HUMAN) 25 G: 0.25 INJECTION, SOLUTION INTRAVENOUS at 06:52

## 2022-01-28 RX ADMIN — FOLIC ACID 1 MG: 1 TABLET ORAL at 10:59

## 2022-01-28 RX ADMIN — ESCITALOPRAM OXALATE 20 MG: 10 TABLET ORAL at 11:00

## 2022-01-28 RX ADMIN — ARFORMOTEROL TARTRATE 15 MCG: 15 SOLUTION RESPIRATORY (INHALATION) at 07:36

## 2022-01-28 RX ADMIN — GABAPENTIN 300 MG: 300 CAPSULE ORAL at 11:00

## 2022-01-28 RX ADMIN — IPRATROPIUM BROMIDE AND ALBUTEROL SULFATE 3 ML: .5; 3 SOLUTION RESPIRATORY (INHALATION) at 00:28

## 2022-01-28 RX ADMIN — LEVOTHYROXINE SODIUM 75 MCG: 0.07 TABLET ORAL at 06:53

## 2022-01-28 RX ADMIN — BUDESONIDE 500 MCG: 0.5 INHALANT RESPIRATORY (INHALATION) at 07:36

## 2022-01-28 RX ADMIN — NYSTATIN: 100000 POWDER TOPICAL at 11:00

## 2022-01-28 RX ADMIN — ALBUMIN (HUMAN) 25 G: 0.25 INJECTION, SOLUTION INTRAVENOUS at 00:28

## 2022-01-28 RX ADMIN — FUROSEMIDE 10 MG: 10 INJECTION INTRAMUSCULAR; INTRAVENOUS at 12:06

## 2022-01-28 RX ADMIN — IPRATROPIUM BROMIDE AND ALBUTEROL SULFATE 3 ML: .5; 3 SOLUTION RESPIRATORY (INHALATION) at 15:39

## 2022-01-28 RX ADMIN — ALBUTEROL SULFATE 2.5 MG: 2.5 SOLUTION RESPIRATORY (INHALATION) at 11:10

## 2022-01-28 RX ADMIN — LEVOFLOXACIN 500 MG: 500 TABLET, FILM COATED ORAL at 10:59

## 2022-01-28 RX ADMIN — SODIUM CHLORIDE, PRESERVATIVE FREE 10 ML: 5 INJECTION INTRAVENOUS at 06:53

## 2022-01-28 RX ADMIN — ALBUMIN (HUMAN) 25 G: 0.25 INJECTION, SOLUTION INTRAVENOUS at 13:02

## 2022-01-28 RX ADMIN — PANTOPRAZOLE SODIUM 40 MG: 40 TABLET, DELAYED RELEASE ORAL at 11:00

## 2022-01-28 RX ADMIN — ALBUTEROL SULFATE 2.5 MG: 2.5 SOLUTION RESPIRATORY (INHALATION) at 07:36

## 2022-01-28 RX ADMIN — IPRATROPIUM BROMIDE AND ALBUTEROL SULFATE 3 ML: .5; 3 SOLUTION RESPIRATORY (INHALATION) at 06:59

## 2022-01-28 NOTE — PROGRESS NOTES
Palliative Medicine    CODE STATUS: DNR/DNI; comfort to be initiated when she arrives home with hospice support; no feeding tube    AMD Status: on file naming her son, Delia Caballero, and daughter-in-law, as her MPOAs     1/28/2022 0920 Seen today in room 313 along with Jami Ackerman NP. Lying in bed. Awake, alert. Appears very fatigued. Respirations mildly labored with audible wheezing. Oxygen on at 3 lpm per NC . Able to speak in 3-4 word sentences. Pain -- denies. Aware the plan is for discharge home today and for hospice care to begin upon her arrival. She is looking forward to being home. Disposition plan: DC home today    Palliative care will continue to follow Miguelina Bolanos  and her family during her hospitalization and support them as they make healthcare decisions and define goals of care.       Becky Riggins RN, MSN  Palliative Medicine  P: [phone]

## 2022-01-28 NOTE — DISCHARGE SUMMARY
Discharge Summary    Patient: Evelyn Kathleen MRN: [id number]  CSN: [id number]    YOB: 1945  Age: 68 y.o. Sex: female    DOA: 1/23/2022 LOS:  LOS: 5 days   Discharge Date:      Primary Care Provider:  Destini Gardiner MD    Admission Diagnoses: Anasarca [R60.1]    Discharge Diagnoses:    Hospital Problems  Date Reviewed: 1/26/2022          Codes Class Noted POA    Severe anemia ICD-10-CM: D64.9  ICD-9-CM: 285.9  1/25/2022 Unknown        COVID-19 vaccine dose not administered ICD-10-CM: Z28.9  ICD-9-CM: V64.00  1/23/2022 Unknown        Cirrhosis ([facility] ICD-10-CM: K74.60  ICD-9-CM: 571.5  Unknown Yes        Hypertension ICD-10-CM: I10  ICD-9-CM: 401.9  Unknown Yes    Overview Signed 12/11/2021 10:59 AM by Tomas Raya MD     well controlled with meds patient states             GERD (gastroesophageal reflux disease) ICD-10-CM: K21.9  ICD-9-CM: 530.81  Unknown Yes        * (Principal) Anasarca ICD-10-CM: R60.1  ICD-9-CM: 001. 3  Unknown Unknown        Ascites ICD-10-CM: R18.8  ICD-9-CM: 789.59  Unknown Yes        Stage 3 chronic kidney disease ([facility] ICD-10-CM: N18.30  ICD-9-CM: 340. 3  Unknown Yes        Hypothyroidism ICD-10-CM: E03.9  ICD-9-CM: 244.9  Unknown Yes              Discharge Condition: stable     Discharge Medications:     Current Discharge Medication List      START taking these medications    Details   albuterol-ipratropium (DUO-NEB) 2.5 mg-0.5 mg/3 ml nebu 3 mL by Nebulization route every four (4) hours as needed for Wheezing. Qty: 30 Nebule, Refills: 0  Start date: 1/28/2022      arformoteroL (BROVANA) 15 mcg/2 mL nebu neb solution 2 mL by Nebulization route two (2) times a day. Qty: 60 Each, Refills: 0  Start date: 1/28/2022      budesonide (PULMICORT) 0.5 mg/2 mL nbsp 2 mL by Nebulization route two (2) times a day. Qty: 60 Each, Refills: 0  Start date: 1/28/2022      levoFLOXacin (Levaquin) 500 mg tablet Take 0.5 Tablets by mouth every fourty-eight (48) hours.   Qty: 3 Tablet, Refills: 0  Start date: 1/28/2022         CONTINUE these medications which have NOT CHANGED    Details   loratadine (Claritin) 10 mg tablet Take 10 mg by mouth daily. escitalopram oxalate (Lexapro) 20 mg tablet Take 20 mg by mouth daily. folic acid (FOLVITE) 1 mg tablet Take 1 mg by mouth daily. Omeprazole delayed release (PRILOSEC D/R) 20 mg tablet Take 20 mg by mouth daily. Indications: gastroesophageal reflux disease, Medication Treatment for Healing Erosive Esophagitis      levothyroxine (SYNTHROID) 50 mcg tablet Take 75 mcg by mouth Daily (before breakfast). Indications: a condition with low thyroid hormone levels      gabapentin (NEURONTIN) 600 mg tablet Take 300 mg by mouth daily. Indications: neuropathic pain      pravastatin (PRAVACHOL) 10 mg tablet Take 10 mg by mouth nightly. Indications: high cholesterol      albuterol (PROVENTIL HFA, VENTOLIN HFA, PROAIR HFA) 90 mcg/actuation inhaler Take 1 Puff by inhalation every four (4) hours as needed for Wheezing. Indications: chronic bronchitis      diphenhydrAMINE (BENADRYL ALLERGY) 25 mg tablet Take 1 Tab by mouth every six (6) hours as needed for Itching. Qty: 60 Tab, Refills: 1      loperamide (IMMODIUM) 2 mg tablet Take 2 mg by mouth three (3) times daily as needed for Diarrhea. desoximetasone (TOPICORT) 0.05 % topical cream Apply  to affected area two (2) times daily as needed for Skin Irritation. loratadine-pseudoephedrine (CLARITIN-D 24 HOUR)  mg per tablet Take 1 Tab by mouth as needed. Procedures : none     Consults: hepatologist       PHYSICAL EXAM  Visit Vitals  BP (!) 104/51   Pulse 93   Temp 98.6 °F (37 °C)   Resp 18   Ht 5' 3\" (1.6 m)   Wt 108.4 kg (239 lb)   SpO2 99%   Breastfeeding No   BMI 42.34 kg/m²     General: Awake, cooperative, no acute distress    HEENT: NC, Atraumatic. PERRLA, EOMI. Anicteric sclerae. Lungs:  Mild Wheezing, no Rhonchi/Rales.   Heart:  Regular  rhythm,  No murmur, No Rubs, No Gallops  Abdomen: Soft,  +distended, Non tender. +Bowel sounds,   Extremities: No c/c +e  Psych:   Not anxious or agitated. Neurologic:  No acute neurological deficits. Admission HPI :   Ar Desai is a 68 y.o. female with cirrhosis with ascites, CKD 3, anemia is sent  to ER per Dr. Kelsey Langston due to worsening swelling. She noted her swelling in legs and abdomen become worsening. She visited Dr. Ricky Godwin recently and recommend to go to ER for further evaluation and treatment. She reported that she never has paracentesis done. She was found h/h was low, occult stool negative. She denies any black stool, or active bleeding. No chest pain, but sob on exertion. She is NOT vaccinated for covid 19 -she said she is \"allergic to too many things\".        Hospital Course :     Ar Desai is a 68 y.o. female with cirrhosis with ascites, CKD 3, anemia was admitted for anasarca due to end stage liver disease. Albumin infusion was started for her anasarca. She has cirrhosis with ascietis with severe anemia. Occult stool negative, upper egd done no acute bleeding noted. Hematologist is consulted for severe anemia. Her h/h continue dropped during her stay, down to 4.1. she refused blood transfusion. Palliative care is consulted. Dnr/ I granted and patient and family would like to go home with home hospice. She has copd -breathing treatment prescribed on discharge. She has ckd 3 and her renal become worsening on discharge-due to cirrhosis and severe anemia. Discharge planning discussed with patient, pt agrees  with the plan and no questions and concerns at this point.        Activity: Activity as tolerated    Diet: Regular Diet    Follow-up: home hospice     Disposition: home hospice     Minutes spent on discharge: 45 min       Labs: Results:       Chemistry Recent Labs     01/28/22  0310 01/27/22  0935 01/26/22  1410   * 104* 124*    136 138   K 3.6 3.9 3.7  105 106   CO2 23 22 24   BUN 26* 23* 22*   CREA 3.78* 3.20* 2.76*   CA 7.7* 7.8* 7.5*   AGAP 9 9 8   BUCR 7* 7* 8*   AP 41*  --  52   TP 6.2*  --  5.9*   ALB 4.7  --  4.0   GLOB 1.5*  --  1.9*   AGRAT 3.1*  --  2.1*      CBC w/Diff Recent Labs     01/28/22  0310 01/26/22  1410   WBC 4.7 3.9*   RBC 1.15* 1.38*   HGB 4.1* 4.8*   HCT 13.0* 15.3*   PLT 32* 35*      Cardiac Enzymes No results for input(s): CPK, CKND1, LIVE in the last 72 hours. No lab exists for component: CKRMB, TROIP   Coagulation No results for input(s): PTP, INR, APTT, INREXT in the last 72 hours. Lipid Panel No results found for: CHOL, CHOLPOCT, CHOLX, CHLST, CHOLV, 286820, HDL, HDLP, LDL, LDLC, DLDLP, 923783, VLDLC, VLDL, TGLX, TRIGL, TRIGP, TGLPOCT, CHHD, CHHDX   BNP No results for input(s): BNPP in the last 72 hours. Liver Enzymes Recent Labs     01/28/22  0310   TP 6.2*   ALB 4.7   AP 41*      Thyroid Studies Lab Results   Component Value Date/Time    TSH 9.94 (H) 01/23/2022 01:44 PM          @micro    Significant Diagnostic Studies: CT ABD PELV WO CONT    Result Date: 1/24/2022  EXAM: CT of the abdomen and pelvis INDICATION: 63-year-old patient with provided history of anemia. Weakness. COMPARISON: None. TECHNIQUE: Axial CT imaging of the abdomen and pelvis was performed without oral or intravenous contrast. Multiplanar reformats were generated. One or more dose reduction techniques were used on this CT: automated exposure control, adjustment of the mAs and/or kVp according to patient size, and iterative reconstruction techniques. The specific techniques used on this CT exam have been documented in the patient's electronic medical record. Digital Imaging and Communications in Medicine (DICOM) format image data are available to nonaffiliated external healthcare facilities or entities on a secure, media free, reciprocally searchable basis with patient authorization for at least a 12-month period after this study.  _______________ FINDINGS: LOWER CHEST: Dependent changes of atelectasis. No alveolar consolidation or pleural effusion. Normal cardiac size. Multivessel coronary arterial atherosclerosis. Increased attenuation of ventricular myocardium relative to adjacent blood pool. LIVER, BILIARY: Shrunken/nodular hepatic contour. Within the limitations of an unenhanced study, no suspicious appearing liver lesion. No biliary dilation. Cholecystectomy. PANCREAS: Normal unenhanced appearance. SPLEEN: Splenomegaly with oblique sagittal span assessment at approximately 17 cm. ADRENALS: Normal. KIDNEYS/URETERS/BLADDER: No hydronephrosis or urolithiasis. Urinary bladder decompressed. PELVIC ORGANS: Unremarkable. VASCULATURE: Diffuse aortobiiliac atherosclerosis. No evidence of aneurysmal dilatation. Several varices present at the gastroesophageal junction with prominent perisplenic collaterals and likely left-sided splenorenal shunt. LYMPH NODES: No enlarged lymph nodes. GASTROINTESTINAL TRACT: No bowel obstruction. No free intraperitoneal gas. BONES: Diffuse osteopenia. Mild to moderate compression deformities involving the superior endplates of F20, L2, and L3. Multilevel spondylosis. Mild anterolisthesis L4 on L5. Lower lumbar predominant facet joint osteoarthritis. OTHER: Extensive body wall edema. Moderately large volume intra-abdominal/pelvic ascites. _______________     1. Cirrhosis of the liver with additional stigmata of portal hypertension to include:   > Splenomegaly   > Gastroesophageal varices   > Splenorenal shunt   > Moderately large volume of intra-abdominal/pelvic ascites. 2. No abnormal bowel wall thickening or adenopathy. 3. Extensive body wall edema. 4. Age-indeterminate, favored chronic compression deformities of the T12, L2 and L3 vertebral body superior endplates. > Grade 1 anterolisthesis L4 on L5 related to advanced facet arthrosis. [facility]     CC:  Jared Izaguirre MD

## 2022-01-28 NOTE — PROGRESS NOTES
19:55 Assessment completed. O2 remains @ 2 LPM per NC. Lungs are decreased with wheezes bilat. (E). Occ  productive cough with sm amts on sputum that has blood present. Continues to have edema in all extremities. (2-3+) Resting quietly in bed talking on her phone. Voiding per BSC w/o difficulty. 23:10 Bedside and Verbal shift change report given to Visto0 Solasta (oncoming nurse) by Leonora Medina RN (offgoing nurse). Report included the following information SBAR.

## 2022-01-28 NOTE — PROGRESS NOTES
Palliative Medicine Consult    Patient Name: Maurice Kolb  YOB: 1945    Date of follow up: 1/28/2022  Reason for Consult: Goals of care discussions  Requesting Provider: Dr. Marbella Brown  Primary Care Physician: Leobardo Regan MD      SUMMARY:   Maurice Kolb is a 68 y.o. with a past history of cirrhosis with ascites, chronic kidney disease stage III, fibromyalgia, and hypertension who was admitted on 1/23/2022 from home (lives with son Glenn Garay and daughter-in-law Dorys Wan) with a diagnosis of severe anemia, cirrhosis, ascites, and chronic kidney disease stage 3. Current medical issues leading to Palliative Medicine involvement include: Support and goals of care discussions. 1/25/2022: Patient is awake, alert, and remains oriented. Family and patient discussed goals of care last night. Patient does not want a blood transfusion for any reason. She otherwise wants to continue full code with full interventions. 1/27/2022: Patient extremely weak this AM, not able to feed self. Feed her but she only ate about 10 percent of her meal. She wants to go home with hospice. 1/28/2022: Patient appears more fatigued with shortness of breath at rest today. Her oxygen is at 4 L nasal cannula. Still wants to go home with hospice, does not want to initiate comfort care here at hospital.   PALLIATIVE DIAGNOSES:   1. Goals of care discussions  2. Severe anemia  3. Cirrhosis with ascites  4. Chronic kidney disease stage III  5. Advanced age/debility       PLAN:   1/28/2022: Palliative medicine team including Davon Gomez RN and I met with patient at patient's bedside. Patient appears more fatigued with shortness of breath at rest today. Her oxygen is at 4 L nasal cannula. Still wants to go home with hospice, does not want to initiate comfort care here at hospital.  Transport time has been set up for 12 PM, appreciate case management assistance.   Patient continues as a DNR/DNI, comfort measures to be initiated at discharge, no feeding tubes. Family states that this hospice is willing to allow blood stimulating products and work closely with Dr. Jessica Hernandez (will defer this to hospice and Dr. Jessica Hernandez). POST Form on file. We will continue to follow for support while patient is hospitalized. See previous discussions below:    1/27/2022: Palliative medicine team including Winston Ascencio RN and I met with patient at patient's bedside. Patient's son Olinda Morris and daughter-in-law  William Savers present via patient's FaceTime. Patient and family had the chance to talk, and patient wants to go home. With Dr. Lisa Kearney assistance at bedside, explained limited treatment options for advanced liver disease and severe anemia (declines blood transfusions.) Patient confirms she wants to go home with hospice. Patient's son and daughter in law are MPOA and are in agreement. They request that she be discharged with hospice tomorrow before the anticipated snow storm comes. Explained comfort measures in detail, and re-explained the benefits and burdens of resuscitation. Patient agrees with DNR/DNI, and comfort measures to be initiated at discharge. Wants to continue supportive treatments while hospitalized. Per discussion with patient's son, they have been in contact with  [address] [place] [address] and this is who they are requesting. Family states that this hospice is willing to allow blood stimulating products and work closely with Dr. Jessica Hernandez (will defer this to hospice and Dr. Jessica Hernandez). Hospice consult placed. Patient signed a POST form today for DNR/DNI, comfort measures (to be initiated at discharge, OK with blood stimulating products if a plan is worked out between Dr. Jessica Hernandez and hospice agency) and no feeding tubes. See previous discussions below:    1/25/2022: Palliative medicine team including Shahram Blair. And I met with patient at patient's bedside. Patient's son Olinda Morris was on video chat during the visit.  Patient is awake, alert, and remains oriented. Family and patient discussed goals of care last night. Patient does not want a blood transfusion for any reason. She does not want someone else's blood in her body, and she is not a Jewish, but does believe in some of the Yazdanism beliefs behind accepting someone else's blood. She is accepting of albumin and is ok with receiving all other alternative blood products. Hematology following with plans to attempt optimizing her Hg levels without blood transfusions. Patient continues to receive IV albumin and edema is improving slowly- hepatologist Dr. Sherie Hill continues to follow. Patient asked when she was being discharged. Explained that her blood count is critically low and we are continuing to remove fluid from her body, and it is unknown at this time her anticipated discharge date. Reiterated that if the hospitalization becomes too difficult and her goal is to go home, we could readdress goals of care to include comfort measures and hospice support. Kathleen Guevara was on the phone during entire conversation and understands her prognosis is poor and agrees to continue talking with patient regarding the possibility of home with hospice. At this time patient wants to remain a full code with full interventions, but no blood. We will continue to follow closely for support and further goals of care discussions. See previous discussions below:     1/24/2022: Goals of care discussions: Palliative medicine team including Lynn Huston RN and I met with patient at patient's bedside. Patient is awake, alert, and oriented x4. Patient has just completed an advanced medical directive with the  (appreciate assistance) where she assigned her primary medical power of  as her son Alexandra Coley, and her secondary medical power of  as daughter-in-law Roosevelt Jules.   Patient also noted she would want all life prolonging measures even in the setting of terminal illness and unawareness. She is receiving albumin infusions for anasarca, she has never had paracentesis done before. She is experiencing severe anemia with shortness of breath on exertion, she is undecided on whether she will accept blood products. She notes that she is not a Latter-day, but does believe in some of the Anabaptism believes behind accepting someone else's blood. Patient wanted her son Kim Bernard and daughter-in-law Ailyn Mai included in goals of care conversation, called them on phone in patient's room and placed them on speaker phone. Discussed the benefits and burdens of continuing aggressive measures in the setting of advanced liver disease and severe anemia, including potential need for multiple blood transfusions. Also discussed transitioning to comfort measures with the support of hospice at discharge. Family is coming to the hospital today to speak with patient in person, advised them to notify RN should patient become agreeable to blood products, to ensure no delay in care. At this time patient is a full code with full interventions but is unclear if she wants blood transfusion or not. We will follow up tomorrow to readdress goals of care once patient and family have an opportunity to talk today. 1. Severe anemia: Symptomatic, shortness of breath on exertion. She has been refusing blood transfusions, stating she really needs to think about it. No black stool noted, CT abdomen pelvis pending for rule out hematoma. Occult stool negative in ER. Plan for hematology consult if no hematoma noted on CT per primary team.  Last hemoglobin 5.2 g/dL. 2. Cirrhosis with ascites: Followed by Dr. Andrea Salomon, came in due to increasing fluid buildup and need for albumin transfusions. Her MELD was recently calculated at 25. Reviewed most recent office visit with Dr. Andrea Salomon, patient is not ready for hospice care. 3. Chronic kidney disease stage III: Monitoring per primary team  4.  Advanced age/debility: 68-year-old female who lives with her son and daughter-in-law. She receives care from [facility] home health, needs assistance with all functional ADLs. She has a wheelchair, rolling walker, and a shower chair as needed. 5. Initial consult note routed to primary continuity provider  6. Communicated plan of care with: Palliative IDT       GOALS OF CARE / TREATMENT PREFERENCES:   [====Goals of Care====]  GOALS OF CARE: DNR/DNI, comfort measures to be initiated at discharge, no feeding tubes  Patient/Health Care Proxy Stated Goals: Comfort      TREATMENT PREFERENCES:   Code Status: DNR    Advance Care Planning:  Advance Care Planning 1/24/2022   Patient's Healthcare Decision Maker is: Named in scanned ACP document   Primary Decision Maker Name -   Primary Decision Maker Phone Number -   Primary Decision Maker Relationship to Patient -   Confirm Advance Directive Yes, on file   Patient Would Like to Complete Advance Directive -       Medical Interventions:  Other (comment)    Artificially Administered Nutrition: No feeding tube      The palliative care team has discussed with patient / health care proxy about goals of care / treatment preferences for patient.  [====Goals of Care====]         HISTORY:     History obtained from: patient, family, chart    CHIEF COMPLAINT: fluid in legs and abdomen, not able to feed self due to weakness, increasing SOB    HPI/SUBJECTIVE:    The patient is:   [x] Verbal and participatory  [] Non-participatory due to:   Oriented x 4    Clinical Pain Assessment (nonverbal scale for severity on nonverbal patients):   Clinical Pain Assessment  Severity: 0            FUNCTIONAL ASSESSMENT:     Palliative Performance Scale (PPS):   PPS: 30       PSYCHOSOCIAL/SPIRITUAL SCREENING:     Advance Care Planning:  Advance Care Planning 1/24/2022   Patient's Healthcare Decision Maker is: Named in scanned ACP document   Primary Decision Maker Name -   Primary Decision Maker Phone Number -   Primary Decision Maker Relationship to Patient -   Confirm Advance Directive Yes, on file   Patient Would Like to Complete Advance Directive -        Any spiritual / Methodist concerns:  [] Yes /  [x] No    Caregiver Burnout:  [] Yes /  [] No /  [x] No Caregiver Present      Anticipatory grief assessment:   [] Normal  / [x] Maladaptive            REVIEW OF SYSTEMS:     Positive and pertinent negative findings in ROS are noted above in HPI. The following systems were [x] reviewed / [] unable to be reviewed as noted in HPI  Other findings are noted below. Systems: constitutional, ears/nose/mouth/throat, respiratory, gastrointestinal, genitourinary, musculoskeletal, integumentary, neurologic, psychiatric, endocrine. Positive findings noted below. Modified ESAS Completed by: provider   Fatigue: 7       Pain: 0   Anxiety: 0 Nausea: 0     Dyspnea: 4     Constipation: No     Stool Occurrence(s): 0        PHYSICAL EXAM:     From RN flowsheet:  Wt Readings from Last 3 Encounters:   01/27/22 108.4 kg (239 lb)   01/19/22 101.2 kg (223 lb)   11/24/21 94.3 kg (208 lb)     Blood pressure (!) 130/52, pulse 98, temperature 98.5 °F (36.9 °C), resp. rate 22, height 5' 3\" (1.6 m), weight 108.4 kg (239 lb), SpO2 100 %, not currently breastfeeding.     Pain Scale 1: Numeric (0 - 10)  Pain Intensity 1: 0     Pain Location 1: Knee           Constitutional: Awake, fatigued, appears chronically ill and debilitated  Eyes: pupils equal, anicteric  ENMT: no nasal discharge, moist mucous membranes  Cardiovascular: regular rhythm, distal pulses intact  Respiratory: breathing mildly labored at rest, symmetric  Gastrointestinal: firm, distended  Musculoskeletal: no deformity, no tenderness to palpation  Skin: warm, dry  Neurologic: following commands, moving all extremities, oriented x 4, upper body weakness with tremors when attempting to feed self  Psychiatric: flat affect, no hallucinations       HISTORY:     Principal Problem: Anasarca ()    Active Problems:    Cirrhosis (HCC) ()      Hypertension ()      Overview: well controlled with meds patient states      GERD (gastroesophageal reflux disease) ()      Ascites ()      Stage 3 chronic kidney disease (HCC) ()      Hypothyroidism ()      COVID-19 vaccine dose not administered (2022)      Severe anemia (2022)      Past Medical History:   Diagnosis Date    Fibromyalgia     GERD (gastroesophageal reflux disease)     Hypertension     well controlled with meds patient states    Liver disease       Past Surgical History:   Procedure Laterality Date    HX BREAST LUMPECTOMY      bilateral    HX CATARACT REMOVAL Right 2016    HX DILATION AND CURETTAGE      HX GI      colonoscopy, EGD    HX HEENT      sinus surgery    HX KNEE REPLACEMENT Right 2017    HX MOHS PROCEDURES      bilateral    HX ORTHOPAEDIC      right foot surgery     HX ORTHOPAEDIC      right knee surgery     HX ORTHOPAEDIC      bilateral carpal tunnel release     HX ORTHOPAEDIC Left 2017    foot    HX OTHER SURGICAL      removal of fatty tumor back and abdomen    HX ROTATOR CUFF REPAIR Bilateral     HX TONSILLECTOMY        History reviewed. No pertinent family history. History reviewed, no pertinent family history. Social History     Tobacco Use    Smoking status: Former Smoker     Quit date:      Years since quittin.1    Smokeless tobacco: Never Used   Substance Use Topics    Alcohol use: No     Allergies   Allergen Reactions    Latex Hives    Acetaminophen Other (comments)     Can not take- has advanced liver disease.     Adhesive Tape-Silicones Other (comments)     Skin burns    Aspirin Other (comments)     Dr. Park Apt does not want pt to take it     Codeine Nausea and Vomiting and Other (comments)    Dilaudid [Hydromorphone] Nausea and Vomiting     Patient also complained of weakness    Doxycycline Hives and Nausea and Vomiting    Hydrocodone Nausea and Vomiting Elevated blood pressure, effected her eyes, weakness    Oxycodone Itching and Nausea and Vomiting     Patient complained of vision disturbance    Penicillins Hives and Other (comments)     \" I go blind\"    Sulfa (Sulfonamide Antibiotics) Unknown (comments)      Current Facility-Administered Medications   Medication Dose Route Frequency    [START ON 1/30/2022] levoFLOXacin (LEVAQUIN) 250 mg in D5W IVPB  250 mg IntraVENous Q48H    arformoteroL (BROVANA) neb solution 15 mcg  15 mcg Nebulization BID RT    budesonide (PULMICORT) 500 mcg/2 ml nebulizer suspension  500 mcg Nebulization BID RT    albuterol (PROVENTIL VENTOLIN) nebulizer solution 2.5 mg  2.5 mg Nebulization QID RT    albuterol-ipratropium (DUO-NEB) 2.5 MG-0.5 MG/3 ML  3 mL Nebulization Q4H PRN    pantoprazole (PROTONIX) tablet 40 mg  40 mg Oral BID    sodium chloride (NS) flush 5-40 mL  5-40 mL IntraVENous Q8H    sodium chloride (NS) flush 5-40 mL  5-40 mL IntraVENous PRN    bisacodyL (DULCOLAX) suppository 10 mg  10 mg Rectal DAILY PRN    promethazine (PHENERGAN) tablet 12.5 mg  12.5 mg Oral Q6H PRN    Or    ondansetron (ZOFRAN) injection 4 mg  4 mg IntraVENous Q6H PRN    nystatin (MYCOSTATIN) 100,000 unit/gram powder   Topical BID    gabapentin (NEURONTIN) capsule 300 mg  300 mg Oral DAILY    levothyroxine (SYNTHROID) tablet 75 mcg  75 mcg Oral ACB    pravastatin (PRAVACHOL) tablet 10 mg  10 mg Oral QHS    folic acid (FOLVITE) tablet 1 mg  1 mg Oral DAILY    escitalopram oxalate (LEXAPRO) tablet 20 mg  20 mg Oral DAILY    albumin human 25% (BUMINATE) solution 25 g  25 g IntraVENous Q6H          LAB AND IMAGING FINDINGS:     Lab Results   Component Value Date/Time    WBC 4.7 01/28/2022 03:10 AM    HGB 4.1 (LL) 01/28/2022 03:10 AM    PLATELET 32 (L) 56/81/9640 03:10 AM     Lab Results   Component Value Date/Time    Sodium 137 01/28/2022 03:10 AM    Potassium 3.6 01/28/2022 03:10 AM    Chloride 105 01/28/2022 03:10 AM    CO2 23 01/28/2022 03:10 AM    BUN 26 (H) 01/28/2022 03:10 AM    Creatinine 3.78 (H) 01/28/2022 03:10 AM    Calcium 7.7 (L) 01/28/2022 03:10 AM      Lab Results   Component Value Date/Time    Alk. phosphatase 41 (L) 01/28/2022 03:10 AM    Protein, total 6.2 (L) 01/28/2022 03:10 AM    Albumin 4.7 01/28/2022 03:10 AM    Globulin 1.5 (L) 01/28/2022 03:10 AM     Lab Results   Component Value Date/Time    INR 2.3 (H) 01/25/2022 08:15 AM    Prothrombin time 24.0 (H) 01/25/2022 08:15 AM    aPTT 47.2 (H) 01/25/2022 08:15 AM      Lab Results   Component Value Date/Time    Iron 104 01/23/2022 01:44 PM    TIBC 165 (L) 01/23/2022 01:44 PM    Iron % saturation 63 (H) 01/23/2022 01:44 PM    Ferritin 330 01/25/2022 04:35 AM      No results found for: PH, PCO2, PO2  No components found for: GLPOC   No results found for: CPK, CKMB             Total time: 15 minutes  Counseling / coordination time, spent as noted above:   > 50% counseling / coordination?: yes, patient, family, and medical team    Prolonged service was provided for  []30 min   []75 min in face to face time in the presence of the patient, spent as noted above. Time Start:   Time End:   Note: this can only be billed with 25152 (initial) or 04058 (follow up). If multiple start / stop times, list each separately.

## 2022-01-28 NOTE — PROGRESS NOTES
Chart Reviewed- Mrs. Joshua Banks has decided to go home with hospice. - Will inform Dr. Mcmahan [place] of her decision  - We appreciate the opportunity to care for Mrs. Strodu  - Will sign off at this time    Aliza Glez MD  [facility]  [phone]

## 2022-01-28 NOTE — PROGRESS NOTES
0774  Bedside shift change report given to [id number] (oncoming nurse) by Lyly Chung RN (offgoing nurse). Report included the following information SBAR, Kardex, Intake/Output and MAR.     9471  Paged Dr. Josh Razo. Patient has not voided this shift and tried, was unable. Cannot ambulate d/t dyspnea at this time. Bladder scan >600.     1503  Spoke to Dr. Honorio Saxena order Straight cath one time and notify how much. Bladder scan could be d/t ascites. Patient has only has 100 ml of fluid today. 0  Paged Dr. Josh Razo.    [phone]  Spoke to Dr. Josh Razo. Straight cath with 40 output ny urine. Cath tubing began to have thick creamy output and stopped draining. [place] [address] to Dr. Josh Razo. Notified patient's lungs have more rales/crackles. Denies increased SOB. Patient is hospice and comfortable. 1912  Bedside and verbal shift change report given to [address] [facility] by Ileana Pérez RN. Report included SBAR, kardex, MAR, and recent results.

## 2022-01-28 NOTE — ROUTINE PROCESS
Discharge instructions reviewed with the patient son Evette Samano. Patient son verbalized understanding and verified by teach back. All questions answered. IV discontinued, no redness, swelling or pain noted. Patient awaiting [facility] transportation home with an ETA of 1200.

## 2022-01-28 NOTE — PROGRESS NOTES
Pharmacy Dosing Services: Levaquin      Indication: Upper respiratory infection  Previous Regimen Levaquin 500 mg IV Q 24 hours   Serum Creatinine Lab Results   Component Value Date/Time    Creatinine 3.78 (H) 01/28/2022 03:10 AM      Creatinine Clearance Estimated Creatinine Clearance: 15 mL/min (A) (based on SCr of 3.78 mg/dL (H)).    BUN Lab Results   Component Value Date/Time    BUN 26 (H) 01/28/2022 03:10 AM       Dose adjusted based on CrCl and Indication to Levaquin 500 mg once then Levaquin 250 mg IV Q 48 hours  Plan:  Continue to monitor

## 2022-01-28 NOTE — PROGRESS NOTES
Patient c/o difficulty breathing with O2 sats 86%. Lung sounds with wheezing bilaterally. Administered duo-neb and increased O2 to 3 liters. O2 sats increased to %. Will continue to monitor. 1499 - Patient slept through the night without further respiratory distress, until this a.m waking up dyspneic. Lungs sounds with wheezing and coarse bilaterally with O2 sats 90%. Administered duo-neb and O2 sats up to 98%. Patient stated more comfortable. 0730 -   Bedside and Verbal shift change report given to GENNY Castellanos RN (oncoming nurse) by Milady Kohli (offgoing nurse). Report included the following information SBAR, Kardex, Intake/Output and MAR.

## 2022-01-28 NOTE — PROGRESS NOTES
Physician Progress Note      Liang Torrez  CSN #:                  [id number]  :                       1945  ADMIT DATE:       2022 1:21 PM  [address] [place] DATE:  RESPONDING  PROVIDER #:        Tara WAHL MD          QUERY TEXT:    Pt admitted with cirrhosis. Noted documentation of SYLVIE with Scr increase from 2.0gm to 2.7 mg on 22 by Hepatology consultant. If possible, please document in progress notes and discharge summary:    The medical record reflects the following:  Risk Factors: Hx CKD3  Clinical Indicators: 22 Hepatology note:  Acute Kidney Injury  Scr has increased from 2.0 gm on admission to 2.7 mg. This has occurred despite IV albumin which typically lowers serum albumin. 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 May now have superimposed SYLVIE-HRS. Serum creatinine 2.06 on admission 22 increased to 2.76 on 22  Treatment: CMP; album every 6 hours; avoid IV contrast; renal dose medicine    Thank you,  [email or web address] [email or web address]  Options provided:  -- SYLVIE on CKD3 confirmed not present on admission  -- SYLVIE ruled out  -- Defer to Hepatology consultant documentation regarding SYLVIE  -- Other - I will add my own diagnosis  -- Disagree - Not applicable / Not valid  -- Disagree - Clinically unable to determine / Unknown  -- Refer to Clinical Documentation Reviewer    PROVIDER RESPONSE TEXT:    The diagnosis of SYLVIE on CKD3 was confirmed not present on admission.     Query created by: Hemanth Israel on 2022 10:25 AM      Electronically signed by:  Tara Metzger MD 2022 8:16 AM

## 2022-01-28 NOTE — PROGRESS NOTES
D/C Plan: [facility]     CM contacted pt's sonAlexandra ([phone]), EV discuss transition of care. Family has indicated they would like to have pt transition home with [address] [place] [address]. Family is prepared to accept this pt today. Transport has been confirmed for today at 12:00pm to pt's  son's home ([address] [place] [address]. [place] [id number]). No other transition of care needs have been identified. CM remains available to assist as needed.       Care Management Interventions  Mode of Transport at Discharge: [facility]  Transition of Care Consult (CM Consult): [address]: No  [facility]: No  Reason Outside [place]: Patient already serviced by other home care/hospice agency  Health Maintenance Reviewed: Yes  Physical Therapy Consult: Yes  Occupational Therapy Consult: Yes  Support Systems: Child(daniel),Other Family Member(s)  Confirm Follow Up Transport: Family  The Plan for Transition of Care is Related to the Following Treatment Goals : [facility]  The Patient and/or Patient Representative was Provided with a Choice of Provider and Agrees with the Discharge Plan?: Yes  Name of the Patient Representative Who was Provided with a Choice of Provider and Agrees with the Discharge Plan: Natasha ackerman of Choice List was Provided with Basic Dialogue that Supports the Patient's Individualized Plan of Care/Goals, Treatment Preferences and Shares the Quality Data Associated with the Providers?: Yes  Discharge Location  Patient Expects to be Discharged to[de-identified] Home with hospice

## 2022-01-29 NOTE — PROGRESS NOTES
Patient was unable to fill brovana and pulmicort at discharge pharmacy   These were called in to [facility] on [address]

## 2022-01-29 NOTE — PROGRESS NOTES
1912 Assumed patient care at this time. Report received from Samantha Steward RN. Per patient's daughter in law, oxygen is being delivered at this time. Unit secretary confirmed with [facility] that they are arriving around 8 pm.     Guzman Fournier here to  patient.

## 2024-01-12 NOTE — PROGRESS NOTES
Group Therapy Note    Date: 1/12/2024    Group Start Time: 1330  Group End Time: 1415  Group Topic: Psychoeducation    [facility]        Group Therapy Note    Attendees: 4/11    Psych-Ed/Relapse Prevention Group Note        Date: January 12, 2024 Start Time: 1:30pm  End Time: 2:15pm      Number of Participants in Group & Unit Census:  4/11    Topic:  interpersonal skills, coping skills, self-expression    Goal of Group: To improve interpersonal skills and coping skills awareness through collaborating with peers and demonstrating self-expression.      Comments:     Patient did not participate in Psych-Ed/Relapse Prevention group, despite staff encouragement and explanation of benefits.  Patient remain seclusive to self.  Q15 minute safety checks maintained for patient safety and will continue to encourage patient to attend unit programming.        Signature:  CINDI Cunningham     Palliative Medicine    CODE STATUS: FULL CODE (declined blood transfusions, accepts albumin)    AMD Status: on file naming her son, Germán Christopher, and her daughter-in-law, as her MPOAs     1/26/2022 1015 Seen today in room 313. Lying in bed. Awake, alert. Oriented x 4. Respirations unlabored. Oxygen on at 2 lpm per NC. Able to speak in full  sentences. Pain -- denies. EGD completed today. No active bleeding seen or variceal banding required. Most recent Hgb 4.8 yesterday    Disposition plan: to be determined based on response to treatment and family decisions    Palliative care will continue to follow Venus Stroud  and her family during her hospitalization and support them as they make healthcare decisions and define goals of care.       Roly Lanier, RN, MSN  Palliative Medicine  P: [phone]

## (undated) DEVICE — Device

## (undated) DEVICE — SPONGE GZ W4XL4IN COT 12 PLY TYP VII WVN C FLD DSGN

## (undated) DEVICE — Z DISCONTINUED NO SUB IDED BLADE OPHTH DIA0.35MM GRDED FOR HNDL BEAV GRD

## (undated) DEVICE — 1060 S-DRAPE SPCL INCISE 10/BX 4BX/CS: Brand: STERI-DRAPE™

## (undated) DEVICE — LINER GLV L R FULL FNGR KEVLAR LYCRA CUT RESIST PWD FREE ST

## (undated) DEVICE — (D)STRIP SKN CLSR 0.5X4IN WHT --

## (undated) DEVICE — ENDO CARRY-ON PROCEDURE KIT INCLUDES ENZYMATIC SPONGE, GAUZE, BIOHAZARD LABEL, TRAY, LUBRICANT, DIRTY SCOPE LABEL, WATER LABEL, TRAY, DRAWSTRING PAD, AND DEFENDO 4-PIECE KIT.: Brand: ENDO CARRY-ON PROCEDURE KIT

## (undated) DEVICE — KENDALL RADIOLUCENT FOAM MONITORING ELECTRODE RECTANGULAR SHAPE: Brand: KENDALL

## (undated) DEVICE — SUT VCRL + 3-0 27IN CT2 UD --

## (undated) DEVICE — (D)PREP SKN CHLRAPRP APPL 26ML -- CONVERT TO ITEM 371833

## (undated) DEVICE — ERASER HEMSTAT BPLR 45D 18G -- WET-FIELD

## (undated) DEVICE — SATINSLIT® KNIFE 3.0MM ANGLED: Brand: SATINSLIT®

## (undated) DEVICE — BASIC SINGLE BASIN 1-LF: Brand: MEDLINE INDUSTRIES, INC.

## (undated) DEVICE — SINGLE PORT MANIFOLD: Brand: NEPTUNE 2

## (undated) DEVICE — MAJ-1414 SINGLE USE ADPATER BIOPSY VALV: Brand: SINGLE USE ADAPTOR BIOPSY VALVE

## (undated) DEVICE — SUT MONOCRYL PLUS UD 4-0 --

## (undated) DEVICE — 3 BONE CEMENT MIXER: Brand: MIXEVAC

## (undated) DEVICE — DRESSING FOAM SELF ADH 20X10 CM ABSORBENT MEPILEX BORDER

## (undated) DEVICE — STERILE TETRA-FLEX CF LF, 6IN X 11 YD: Brand: TETRA-FLEX™ CF

## (undated) DEVICE — DISPOSABLE TOURNIQUET CUFF SINGLE BLADDER, SINGLE PORT AND QUICK CONNECT CONNECTOR: Brand: COLOR CUFF

## (undated) DEVICE — DEVON™ KNEE AND BODY STRAP 60" X 3" (1.5 M X 7.6 CM): Brand: DEVON

## (undated) DEVICE — INTENDED FOR TISSUE SEPARATION, AND OTHER PROCEDURES THAT REQUIRE A SHARP SURGICAL BLADE TO PUNCTURE OR CUT.: Brand: BARD-PARKER ® CARBON RIB-BACK BLADES

## (undated) DEVICE — CYTOTOME IRRIG 25GA L16MM ANT CAP BENT

## (undated) DEVICE — (D)SYR 10ML 1/5ML GRAD NSAF -- PKGING CHANGE USE ITEM 338027

## (undated) DEVICE — PACK PROCEDURE SURG TOT KNEE CUST

## (undated) DEVICE — CORD BPLR L12FT DISP

## (undated) DEVICE — 3M™ COBAN™ NL STERILE NON-LATEX SELF-ADHERENT WRAP, 2086S, 6 IN X 5 YD (15 CM X 4,5 M), 12 ROLLS/CASE: Brand: 3M™ COBAN™

## (undated) DEVICE — SUT ETHBND 1 30IN OS8 GRN --

## (undated) DEVICE — MOUTHPIECE ENDOSCP 20X27MM --

## (undated) DEVICE — SHEET,DRAPE,70X85,STERILE: Brand: MEDLINE

## (undated) DEVICE — BLADE SAW W13XL90MM 1.19MM PARA

## (undated) DEVICE — T4 ZIPPER TOGA, (L/XL)

## (undated) DEVICE — SATINCRESCENT® KNIFE ANGLED BEVEL UP: Brand: SATINCRESCENT®

## (undated) DEVICE — SUT VCRL + 0 36IN CT1 UD --

## (undated) DEVICE — REM POLYHESIVE ADULT PATIENT RETURN ELECTRODE: Brand: VALLEYLAB

## (undated) DEVICE — UNDERCAST PADDING: Brand: DEROYAL

## (undated) DEVICE — SOL IRR NACL 0.9% 500ML POUR --

## (undated) DEVICE — LEGGINGS, PAIR, 31X48, STERILE: Brand: MEDLINE

## (undated) DEVICE — SOL INJ L R 1000ML BG --

## (undated) DEVICE — SYR 10ML CTRL LR LCK NSAF LF --

## (undated) DEVICE — STRYKER PERFORMANCE SERIES SAGITTAL BLADE: Brand: STRYKER PERFORMANCE SERIES

## (undated) DEVICE — KENDALL SCD EXPRESS SLEEVES, KNEE LENGTH, MEDIUM: Brand: KENDALL SCD

## (undated) DEVICE — T5 HOOD WITH PEEL AWAY FACE SHIELD

## (undated) DEVICE — PLUS HANDPIECE WITH SPRAY TIP: Brand: SURGILAV

## (undated) DEVICE — PAD,EYE,1-5/8X2 5/8,STERILE,LF,1/PK: Brand: MEDLINE

## (undated) DEVICE — STERILE POLYISOPRENE POWDER-FREE SURGICAL GLOVES WITH EMOLLIENT COATING: Brand: PROTEXIS

## (undated) DEVICE — STERILE POLYISOPRENE POWDER-FREE SURGICAL GLOVES: Brand: PROTEXIS

## (undated) DEVICE — SUT VCRL + 1 36IN CT1 VIO --

## (undated) DEVICE — TRAP SPEC COLL POLYP POLYSTYR --

## (undated) DEVICE — Z DISCONTINUED USE 2429233 DRESSING FOAM W10XL10CM 5 LAYR SELF ADH VERSATILE SAFETAC

## (undated) DEVICE — MEDI-VAC NON-CONDUCTIVE SUCTION TUBING: Brand: CARDINAL HEALTH

## (undated) DEVICE — NEEDLE HYPO 25GA L1.5IN BLU POLYPR HUB S STL REG BVL STR

## (undated) DEVICE — DRAIN KT WND 10FR RND 400ML --

## (undated) DEVICE — CANN VISCOFLOW FRM 27G 22MM --

## (undated) DEVICE — NEEDLE HYPO 21GA L1.5IN INTRAMUSCULAR S STL LATCH BVL UP